# Patient Record
Sex: FEMALE | Race: WHITE | NOT HISPANIC OR LATINO | Employment: OTHER | URBAN - METROPOLITAN AREA
[De-identification: names, ages, dates, MRNs, and addresses within clinical notes are randomized per-mention and may not be internally consistent; named-entity substitution may affect disease eponyms.]

---

## 2017-03-27 ENCOUNTER — APPOINTMENT (OUTPATIENT)
Dept: PREADMISSION TESTING | Facility: HOSPITAL | Age: 57
End: 2017-03-27

## 2017-03-27 VITALS
TEMPERATURE: 98 F | BODY MASS INDEX: 27.66 KG/M2 | WEIGHT: 162 LBS | DIASTOLIC BLOOD PRESSURE: 74 MMHG | HEIGHT: 64 IN | RESPIRATION RATE: 16 BRPM | SYSTOLIC BLOOD PRESSURE: 109 MMHG | HEART RATE: 72 BPM | OXYGEN SATURATION: 96 %

## 2017-03-27 LAB
ANION GAP SERPL CALCULATED.3IONS-SCNC: 16.7 MMOL/L
BUN BLD-MCNC: 15 MG/DL (ref 6–20)
BUN/CREAT SERPL: 22.7 (ref 7–25)
CALCIUM SPEC-SCNC: 9.1 MG/DL (ref 8.6–10.5)
CHLORIDE SERPL-SCNC: 100 MMOL/L (ref 98–107)
CO2 SERPL-SCNC: 25.3 MMOL/L (ref 22–29)
CREAT BLD-MCNC: 0.66 MG/DL (ref 0.57–1)
DEPRECATED RDW RBC AUTO: 43.9 FL (ref 37–54)
ERYTHROCYTE [DISTWIDTH] IN BLOOD BY AUTOMATED COUNT: 13.5 % (ref 11.7–13)
GFR SERPL CREATININE-BSD FRML MDRD: 92 ML/MIN/1.73
GLUCOSE BLD-MCNC: 169 MG/DL (ref 65–99)
HCT VFR BLD AUTO: 43.4 % (ref 35.6–45.5)
HGB BLD-MCNC: 14.9 G/DL (ref 11.9–15.5)
MCH RBC QN AUTO: 30.6 PG (ref 26.9–32)
MCHC RBC AUTO-ENTMCNC: 34.3 G/DL (ref 32.4–36.3)
MCV RBC AUTO: 89.1 FL (ref 80.5–98.2)
PLATELET # BLD AUTO: 131 10*3/MM3 (ref 140–500)
PMV BLD AUTO: 10.6 FL (ref 6–12)
POTASSIUM BLD-SCNC: 3.4 MMOL/L (ref 3.5–5.2)
RBC # BLD AUTO: 4.87 10*6/MM3 (ref 3.9–5.2)
SODIUM BLD-SCNC: 142 MMOL/L (ref 136–145)
WBC NRBC COR # BLD: 5.38 10*3/MM3 (ref 4.5–10.7)

## 2017-03-27 PROCEDURE — 93005 ELECTROCARDIOGRAM TRACING: CPT

## 2017-03-27 PROCEDURE — 93010 ELECTROCARDIOGRAM REPORT: CPT | Performed by: INTERNAL MEDICINE

## 2017-03-27 PROCEDURE — 80048 BASIC METABOLIC PNL TOTAL CA: CPT | Performed by: SURGERY

## 2017-03-27 PROCEDURE — 85027 COMPLETE CBC AUTOMATED: CPT | Performed by: SURGERY

## 2017-03-27 PROCEDURE — 36415 COLL VENOUS BLD VENIPUNCTURE: CPT

## 2017-03-27 RX ORDER — HYDROCHLOROTHIAZIDE 25 MG/1
25 TABLET ORAL DAILY
COMMUNITY
End: 2017-08-14

## 2017-03-27 RX ORDER — LORATADINE 10 MG/1
10 TABLET ORAL DAILY
COMMUNITY
End: 2021-01-14

## 2017-03-27 RX ORDER — CHOLECALCIFEROL (VITAMIN D3) 25 MCG
1000 CAPSULE ORAL DAILY
COMMUNITY
End: 2021-01-14

## 2017-03-27 RX ORDER — GABAPENTIN 300 MG/1
300 CAPSULE ORAL NIGHTLY
COMMUNITY
End: 2017-08-14 | Stop reason: SDUPTHER

## 2017-03-27 RX ORDER — HYDROCODONE BITARTRATE AND ACETAMINOPHEN 7.5; 325 MG/1; MG/1
1 TABLET ORAL EVERY 6 HOURS PRN
COMMUNITY
End: 2021-01-14

## 2017-03-27 RX ORDER — METOPROLOL SUCCINATE 50 MG/1
50 TABLET, EXTENDED RELEASE ORAL DAILY
COMMUNITY
End: 2021-01-14

## 2017-03-27 RX ORDER — RANITIDINE 300 MG/1
300 TABLET ORAL NIGHTLY PRN
COMMUNITY
End: 2021-01-14

## 2017-03-27 RX ORDER — PNV NO.95/FERROUS FUM/FOLIC AC 28MG-0.8MG
1 TABLET ORAL
COMMUNITY
End: 2021-01-14

## 2017-03-27 RX ORDER — VENLAFAXINE HYDROCHLORIDE 150 MG/1
150 CAPSULE, EXTENDED RELEASE ORAL DAILY
COMMUNITY
End: 2021-01-14

## 2017-03-27 RX ORDER — BACLOFEN 10 MG/1
10 TABLET ORAL 3 TIMES DAILY PRN
COMMUNITY
End: 2021-01-14

## 2017-03-27 RX ORDER — CLONAZEPAM 2 MG/1
2 TABLET ORAL NIGHTLY
COMMUNITY
End: 2021-01-14

## 2017-03-27 RX ORDER — BUTALBITAL, ACETAMINOPHEN AND CAFFEINE 50; 325; 40 MG/1; MG/1; MG/1
1 TABLET ORAL EVERY 4 HOURS PRN
COMMUNITY
End: 2021-01-14

## 2017-03-27 RX ORDER — GABAPENTIN 100 MG/1
100 CAPSULE ORAL 2 TIMES DAILY
COMMUNITY
End: 2017-08-14 | Stop reason: SDUPTHER

## 2017-03-27 RX ORDER — PANTOPRAZOLE SODIUM 40 MG/1
40 TABLET, DELAYED RELEASE ORAL DAILY
COMMUNITY
End: 2021-01-14

## 2017-04-03 ENCOUNTER — ANESTHESIA EVENT (OUTPATIENT)
Dept: PERIOP | Facility: HOSPITAL | Age: 57
End: 2017-04-03

## 2017-04-03 ENCOUNTER — ANESTHESIA (OUTPATIENT)
Dept: PERIOP | Facility: HOSPITAL | Age: 57
End: 2017-04-03

## 2017-04-03 ENCOUNTER — HOSPITAL ENCOUNTER (OUTPATIENT)
Facility: HOSPITAL | Age: 57
Discharge: HOME | End: 2017-04-04
Attending: SURGERY | Admitting: SURGERY

## 2017-04-03 DIAGNOSIS — N62 MACROMASTIA: ICD-10-CM

## 2017-04-03 PROCEDURE — A9270 NON-COVERED ITEM OR SERVICE: HCPCS | Performed by: SURGERY

## 2017-04-03 PROCEDURE — 25010000002 ONDANSETRON PER 1 MG: Performed by: NURSE ANESTHETIST, CERTIFIED REGISTERED

## 2017-04-03 PROCEDURE — 63710000001 POVIDONE-IODINE 10 % SOLUTION 15 ML BOTTLE: Performed by: SURGERY

## 2017-04-03 PROCEDURE — 25010000003 METHYLPREDNISOLONE PER 125 MG: Performed by: SURGERY

## 2017-04-03 PROCEDURE — 25010000002 FENTANYL CITRATE (PF) 100 MCG/2ML SOLUTION: Performed by: NURSE ANESTHETIST, CERTIFIED REGISTERED

## 2017-04-03 PROCEDURE — 88305 TISSUE EXAM BY PATHOLOGIST: CPT | Performed by: SURGERY

## 2017-04-03 PROCEDURE — 25010000002 PROPOFOL 10 MG/ML EMULSION: Performed by: NURSE ANESTHETIST, CERTIFIED REGISTERED

## 2017-04-03 PROCEDURE — 25010000002 NEOSTIGMINE 10 MG/10ML SOLUTION: Performed by: ANESTHESIOLOGY

## 2017-04-03 PROCEDURE — 25010000002 HYDROMORPHONE PER 4 MG: Performed by: ANESTHESIOLOGY

## 2017-04-03 PROCEDURE — 63710000001 GABAPENTIN 300 MG CAPSULE: Performed by: SURGERY

## 2017-04-03 PROCEDURE — 63710000001 HYDROCODONE-ACETAMINOPHEN 7.5-325 MG TABLET: Performed by: SURGERY

## 2017-04-03 PROCEDURE — 63710000001 CYCLOBENZAPRINE 10 MG TABLET: Performed by: SURGERY

## 2017-04-03 PROCEDURE — 63710000001 FAMOTIDINE 20 MG TABLET: Performed by: SURGERY

## 2017-04-03 PROCEDURE — 63710000001 NITROGLYCERIN 2 % OINTMENT: Performed by: SURGERY

## 2017-04-03 PROCEDURE — 25010000002 ROPIVACAINE PER 1 MG: Performed by: SURGERY

## 2017-04-03 PROCEDURE — 25010000002 MIDAZOLAM PER 1 MG: Performed by: ANESTHESIOLOGY

## 2017-04-03 PROCEDURE — 63710000001 ONDANSETRON PER 8 MG: Performed by: SURGERY

## 2017-04-03 PROCEDURE — 63710000001 DOCUSATE SODIUM 100 MG CAPSULE: Performed by: SURGERY

## 2017-04-03 RX ORDER — LIDOCAINE HYDROCHLORIDE 10 MG/ML
1 INJECTION, SOLUTION EPIDURAL; INFILTRATION; INTRACAUDAL; PERINEURAL ONCE
Status: DISCONTINUED | OUTPATIENT
Start: 2017-04-03 | End: 2017-04-03 | Stop reason: HOSPADM

## 2017-04-03 RX ORDER — GABAPENTIN 100 MG/1
100 CAPSULE ORAL 2 TIMES DAILY
Status: DISCONTINUED | OUTPATIENT
Start: 2017-04-03 | End: 2017-04-04 | Stop reason: HOSPADM

## 2017-04-03 RX ORDER — HYDROMORPHONE HYDROCHLORIDE 1 MG/ML
0.5 INJECTION, SOLUTION INTRAMUSCULAR; INTRAVENOUS; SUBCUTANEOUS
Status: DISCONTINUED | OUTPATIENT
Start: 2017-04-03 | End: 2017-04-03 | Stop reason: HOSPADM

## 2017-04-03 RX ORDER — ACETAMINOPHEN 325 MG/1
975 TABLET ORAL ONCE
Status: COMPLETED | OUTPATIENT
Start: 2017-04-03 | End: 2017-04-03

## 2017-04-03 RX ORDER — FENTANYL CITRATE 50 UG/ML
INJECTION, SOLUTION INTRAMUSCULAR; INTRAVENOUS AS NEEDED
Status: DISCONTINUED | OUTPATIENT
Start: 2017-04-03 | End: 2017-04-03 | Stop reason: SURG

## 2017-04-03 RX ORDER — METOPROLOL SUCCINATE 50 MG/1
50 TABLET, EXTENDED RELEASE ORAL DAILY
Status: DISCONTINUED | OUTPATIENT
Start: 2017-04-03 | End: 2017-04-04 | Stop reason: HOSPADM

## 2017-04-03 RX ORDER — HYDRALAZINE HYDROCHLORIDE 20 MG/ML
5 INJECTION INTRAMUSCULAR; INTRAVENOUS
Status: DISCONTINUED | OUTPATIENT
Start: 2017-04-03 | End: 2017-04-03 | Stop reason: HOSPADM

## 2017-04-03 RX ORDER — ROPIVACAINE HYDROCHLORIDE 5 MG/ML
INJECTION, SOLUTION EPIDURAL; INFILTRATION; PERINEURAL AS NEEDED
Status: DISCONTINUED | OUTPATIENT
Start: 2017-04-03 | End: 2017-04-03 | Stop reason: HOSPADM

## 2017-04-03 RX ORDER — HYDROMORPHONE HCL 110MG/55ML
PATIENT CONTROLLED ANALGESIA SYRINGE INTRAVENOUS AS NEEDED
Status: DISCONTINUED | OUTPATIENT
Start: 2017-04-03 | End: 2017-04-03 | Stop reason: SURG

## 2017-04-03 RX ORDER — FAMOTIDINE 20 MG/1
40 TABLET, FILM COATED ORAL NIGHTLY
Status: DISCONTINUED | OUTPATIENT
Start: 2017-04-03 | End: 2017-04-04 | Stop reason: HOSPADM

## 2017-04-03 RX ORDER — POLYETHYLENE GLYCOL 3350 17 G/17G
17 POWDER, FOR SOLUTION ORAL DAILY
Status: DISCONTINUED | OUTPATIENT
Start: 2017-04-04 | End: 2017-04-04 | Stop reason: HOSPADM

## 2017-04-03 RX ORDER — SODIUM CHLORIDE, SODIUM LACTATE, POTASSIUM CHLORIDE, CALCIUM CHLORIDE 600; 310; 30; 20 MG/100ML; MG/100ML; MG/100ML; MG/100ML
9 INJECTION, SOLUTION INTRAVENOUS CONTINUOUS
Status: DISCONTINUED | OUTPATIENT
Start: 2017-04-03 | End: 2017-04-03

## 2017-04-03 RX ORDER — PROMETHAZINE HYDROCHLORIDE 25 MG/1
25 SUPPOSITORY RECTAL ONCE AS NEEDED
Status: DISCONTINUED | OUTPATIENT
Start: 2017-04-03 | End: 2017-04-03 | Stop reason: HOSPADM

## 2017-04-03 RX ORDER — CYCLOBENZAPRINE HCL 10 MG
5 TABLET ORAL 3 TIMES DAILY PRN
Status: DISCONTINUED | OUTPATIENT
Start: 2017-04-03 | End: 2017-04-04 | Stop reason: HOSPADM

## 2017-04-03 RX ORDER — HYDROMORPHONE HYDROCHLORIDE 1 MG/ML
0.25 INJECTION, SOLUTION INTRAMUSCULAR; INTRAVENOUS; SUBCUTANEOUS
Status: DISCONTINUED | OUTPATIENT
Start: 2017-04-03 | End: 2017-04-04 | Stop reason: HOSPADM

## 2017-04-03 RX ORDER — ONDANSETRON 2 MG/ML
4 INJECTION INTRAMUSCULAR; INTRAVENOUS ONCE AS NEEDED
Status: DISCONTINUED | OUTPATIENT
Start: 2017-04-03 | End: 2017-04-03 | Stop reason: HOSPADM

## 2017-04-03 RX ORDER — HYDROCODONE BITARTRATE AND ACETAMINOPHEN 7.5; 325 MG/1; MG/1
1 TABLET ORAL EVERY 6 HOURS PRN
Status: DISCONTINUED | OUTPATIENT
Start: 2017-04-03 | End: 2017-04-04 | Stop reason: HOSPADM

## 2017-04-03 RX ORDER — HYDROCODONE BITARTRATE AND ACETAMINOPHEN 7.5; 325 MG/1; MG/1
2 TABLET ORAL EVERY 4 HOURS PRN
Status: DISCONTINUED | OUTPATIENT
Start: 2017-04-03 | End: 2017-04-04 | Stop reason: HOSPADM

## 2017-04-03 RX ORDER — NEOSTIGMINE METHYLSULFATE 1 MG/ML
INJECTION, SOLUTION INTRAVENOUS AS NEEDED
Status: DISCONTINUED | OUTPATIENT
Start: 2017-04-03 | End: 2017-04-03 | Stop reason: SURG

## 2017-04-03 RX ORDER — GLYCOPYRROLATE 0.2 MG/ML
INJECTION INTRAMUSCULAR; INTRAVENOUS AS NEEDED
Status: DISCONTINUED | OUTPATIENT
Start: 2017-04-03 | End: 2017-04-03 | Stop reason: SURG

## 2017-04-03 RX ORDER — SCOLOPAMINE TRANSDERMAL SYSTEM 1 MG/1
1 PATCH, EXTENDED RELEASE TRANSDERMAL ONCE
Status: DISCONTINUED | OUTPATIENT
Start: 2017-04-03 | End: 2017-04-03

## 2017-04-03 RX ORDER — SODIUM CHLORIDE 0.9 % (FLUSH) 0.9 %
1-10 SYRINGE (ML) INJECTION AS NEEDED
Status: DISCONTINUED | OUTPATIENT
Start: 2017-04-03 | End: 2017-04-03 | Stop reason: HOSPADM

## 2017-04-03 RX ORDER — NALOXONE HCL 0.4 MG/ML
0.2 VIAL (ML) INJECTION AS NEEDED
Status: DISCONTINUED | OUTPATIENT
Start: 2017-04-03 | End: 2017-04-03 | Stop reason: HOSPADM

## 2017-04-03 RX ORDER — ONDANSETRON 4 MG/1
4 TABLET, FILM COATED ORAL EVERY 6 HOURS PRN
Status: DISCONTINUED | OUTPATIENT
Start: 2017-04-03 | End: 2017-04-04 | Stop reason: HOSPADM

## 2017-04-03 RX ORDER — CELECOXIB 200 MG/1
400 CAPSULE ORAL ONCE
Status: COMPLETED | OUTPATIENT
Start: 2017-04-03 | End: 2017-04-03

## 2017-04-03 RX ORDER — PANTOPRAZOLE SODIUM 40 MG/1
40 TABLET, DELAYED RELEASE ORAL DAILY
Status: DISCONTINUED | OUTPATIENT
Start: 2017-04-03 | End: 2017-04-03

## 2017-04-03 RX ORDER — HYDROXYZINE PAMOATE 50 MG/1
50 CAPSULE ORAL ONCE
Status: COMPLETED | OUTPATIENT
Start: 2017-04-03 | End: 2017-04-03

## 2017-04-03 RX ORDER — PROMETHAZINE HYDROCHLORIDE 25 MG/ML
12.5 INJECTION, SOLUTION INTRAMUSCULAR; INTRAVENOUS ONCE AS NEEDED
Status: DISCONTINUED | OUTPATIENT
Start: 2017-04-03 | End: 2017-04-03 | Stop reason: HOSPADM

## 2017-04-03 RX ORDER — ONDANSETRON 2 MG/ML
INJECTION INTRAMUSCULAR; INTRAVENOUS AS NEEDED
Status: DISCONTINUED | OUTPATIENT
Start: 2017-04-03 | End: 2017-04-03 | Stop reason: SURG

## 2017-04-03 RX ORDER — ONDANSETRON 4 MG/1
4 TABLET, ORALLY DISINTEGRATING ORAL EVERY 6 HOURS PRN
Status: DISCONTINUED | OUTPATIENT
Start: 2017-04-03 | End: 2017-04-04 | Stop reason: HOSPADM

## 2017-04-03 RX ORDER — MIDAZOLAM HYDROCHLORIDE 1 MG/ML
1 INJECTION INTRAMUSCULAR; INTRAVENOUS
Status: DISCONTINUED | OUTPATIENT
Start: 2017-04-03 | End: 2017-04-03 | Stop reason: HOSPADM

## 2017-04-03 RX ORDER — DOCUSATE SODIUM 100 MG/1
100 CAPSULE, LIQUID FILLED ORAL 2 TIMES DAILY
Status: DISCONTINUED | OUTPATIENT
Start: 2017-04-03 | End: 2017-04-04 | Stop reason: HOSPADM

## 2017-04-03 RX ORDER — CLONAZEPAM 0.5 MG/1
2 TABLET ORAL NIGHTLY
Status: DISCONTINUED | OUTPATIENT
Start: 2017-04-03 | End: 2017-04-04 | Stop reason: HOSPADM

## 2017-04-03 RX ORDER — FLUMAZENIL 0.1 MG/ML
0.2 INJECTION INTRAVENOUS AS NEEDED
Status: DISCONTINUED | OUTPATIENT
Start: 2017-04-03 | End: 2017-04-03 | Stop reason: HOSPADM

## 2017-04-03 RX ORDER — SODIUM CHLORIDE, SODIUM LACTATE, POTASSIUM CHLORIDE, CALCIUM CHLORIDE 600; 310; 30; 20 MG/100ML; MG/100ML; MG/100ML; MG/100ML
125 INJECTION, SOLUTION INTRAVENOUS CONTINUOUS
Status: DISCONTINUED | OUTPATIENT
Start: 2017-04-03 | End: 2017-04-04 | Stop reason: HOSPADM

## 2017-04-03 RX ORDER — BACLOFEN 10 MG/1
10 TABLET ORAL 3 TIMES DAILY PRN
Status: DISCONTINUED | OUTPATIENT
Start: 2017-04-03 | End: 2017-04-04 | Stop reason: HOSPADM

## 2017-04-03 RX ORDER — NALOXONE HCL 0.4 MG/ML
0.1 VIAL (ML) INJECTION
Status: DISCONTINUED | OUTPATIENT
Start: 2017-04-03 | End: 2017-04-04 | Stop reason: HOSPADM

## 2017-04-03 RX ORDER — HYDROCODONE BITARTRATE AND ACETAMINOPHEN 7.5; 325 MG/1; MG/1
1 TABLET ORAL ONCE AS NEEDED
Status: DISCONTINUED | OUTPATIENT
Start: 2017-04-03 | End: 2017-04-03 | Stop reason: HOSPADM

## 2017-04-03 RX ORDER — FENTANYL CITRATE 50 UG/ML
50 INJECTION, SOLUTION INTRAMUSCULAR; INTRAVENOUS
Status: DISCONTINUED | OUTPATIENT
Start: 2017-04-03 | End: 2017-04-03 | Stop reason: HOSPADM

## 2017-04-03 RX ORDER — CYCLOBENZAPRINE HCL 10 MG
5 TABLET ORAL ONCE
Status: COMPLETED | OUTPATIENT
Start: 2017-04-03 | End: 2017-04-03

## 2017-04-03 RX ORDER — HYDROCODONE BITARTRATE AND ACETAMINOPHEN 7.5; 325 MG/1; MG/1
2 TABLET ORAL EVERY 4 HOURS PRN
Status: DISCONTINUED | OUTPATIENT
Start: 2017-04-03 | End: 2017-04-03

## 2017-04-03 RX ORDER — GABAPENTIN 300 MG/1
300 CAPSULE ORAL NIGHTLY
Status: DISCONTINUED | OUTPATIENT
Start: 2017-04-03 | End: 2017-04-04 | Stop reason: HOSPADM

## 2017-04-03 RX ORDER — ONDANSETRON 2 MG/ML
4 INJECTION INTRAMUSCULAR; INTRAVENOUS EVERY 6 HOURS PRN
Status: DISCONTINUED | OUTPATIENT
Start: 2017-04-03 | End: 2017-04-04 | Stop reason: HOSPADM

## 2017-04-03 RX ORDER — PROMETHAZINE HYDROCHLORIDE 25 MG/1
25 TABLET ORAL ONCE AS NEEDED
Status: DISCONTINUED | OUTPATIENT
Start: 2017-04-03 | End: 2017-04-03 | Stop reason: HOSPADM

## 2017-04-03 RX ORDER — PANTOPRAZOLE SODIUM 40 MG/1
40 TABLET, DELAYED RELEASE ORAL
Status: DISCONTINUED | OUTPATIENT
Start: 2017-04-03 | End: 2017-04-04 | Stop reason: HOSPADM

## 2017-04-03 RX ORDER — VENLAFAXINE HYDROCHLORIDE 150 MG/1
150 CAPSULE, EXTENDED RELEASE ORAL DAILY
Status: DISCONTINUED | OUTPATIENT
Start: 2017-04-03 | End: 2017-04-04 | Stop reason: HOSPADM

## 2017-04-03 RX ORDER — OXYCODONE AND ACETAMINOPHEN 7.5; 325 MG/1; MG/1
1 TABLET ORAL ONCE AS NEEDED
Status: DISCONTINUED | OUTPATIENT
Start: 2017-04-03 | End: 2017-04-03 | Stop reason: HOSPADM

## 2017-04-03 RX ORDER — LABETALOL HYDROCHLORIDE 5 MG/ML
5 INJECTION, SOLUTION INTRAVENOUS
Status: DISCONTINUED | OUTPATIENT
Start: 2017-04-03 | End: 2017-04-03 | Stop reason: HOSPADM

## 2017-04-03 RX ORDER — HYDROXYZINE PAMOATE 50 MG/1
50 CAPSULE ORAL 4 TIMES DAILY PRN
Status: DISCONTINUED | OUTPATIENT
Start: 2017-04-03 | End: 2017-04-04 | Stop reason: HOSPADM

## 2017-04-03 RX ORDER — CYCLOBENZAPRINE HCL 10 MG
10 TABLET ORAL ONCE
Status: COMPLETED | OUTPATIENT
Start: 2017-04-03 | End: 2017-04-03

## 2017-04-03 RX ORDER — ROCURONIUM BROMIDE 10 MG/ML
INJECTION, SOLUTION INTRAVENOUS AS NEEDED
Status: DISCONTINUED | OUTPATIENT
Start: 2017-04-03 | End: 2017-04-03 | Stop reason: SURG

## 2017-04-03 RX ORDER — DIPHENHYDRAMINE HYDROCHLORIDE 50 MG/ML
12.5 INJECTION INTRAMUSCULAR; INTRAVENOUS
Status: DISCONTINUED | OUTPATIENT
Start: 2017-04-03 | End: 2017-04-03 | Stop reason: HOSPADM

## 2017-04-03 RX ORDER — PROMETHAZINE HYDROCHLORIDE 25 MG/1
12.5 TABLET ORAL ONCE AS NEEDED
Status: DISCONTINUED | OUTPATIENT
Start: 2017-04-03 | End: 2017-04-03 | Stop reason: HOSPADM

## 2017-04-03 RX ORDER — MIDAZOLAM HYDROCHLORIDE 1 MG/ML
2 INJECTION INTRAMUSCULAR; INTRAVENOUS
Status: DISCONTINUED | OUTPATIENT
Start: 2017-04-03 | End: 2017-04-03 | Stop reason: HOSPADM

## 2017-04-03 RX ORDER — CETIRIZINE HYDROCHLORIDE 10 MG/1
10 TABLET ORAL DAILY
Status: DISCONTINUED | OUTPATIENT
Start: 2017-04-03 | End: 2017-04-04 | Stop reason: HOSPADM

## 2017-04-03 RX ORDER — CLINDAMYCIN PHOSPHATE 900 MG/50ML
900 INJECTION INTRAVENOUS ONCE
Status: COMPLETED | OUTPATIENT
Start: 2017-04-03 | End: 2017-04-03

## 2017-04-03 RX ORDER — PROPOFOL 10 MG/ML
VIAL (ML) INTRAVENOUS AS NEEDED
Status: DISCONTINUED | OUTPATIENT
Start: 2017-04-03 | End: 2017-04-03 | Stop reason: SURG

## 2017-04-03 RX ORDER — FAMOTIDINE 10 MG/ML
20 INJECTION, SOLUTION INTRAVENOUS ONCE
Status: COMPLETED | OUTPATIENT
Start: 2017-04-03 | End: 2017-04-03

## 2017-04-03 RX ORDER — LIDOCAINE HYDROCHLORIDE 20 MG/ML
INJECTION, SOLUTION INFILTRATION; PERINEURAL AS NEEDED
Status: DISCONTINUED | OUTPATIENT
Start: 2017-04-03 | End: 2017-04-03 | Stop reason: SURG

## 2017-04-03 RX ADMIN — PROPOFOL 200 MG: 10 INJECTION, EMULSION INTRAVENOUS at 12:21

## 2017-04-03 RX ADMIN — ONDANSETRON 4 MG: 4 TABLET, FILM COATED ORAL at 17:54

## 2017-04-03 RX ADMIN — GABAPENTIN 300 MG: 300 CAPSULE ORAL at 23:30

## 2017-04-03 RX ADMIN — ACETAMINOPHEN 975 MG: 325 TABLET ORAL at 11:36

## 2017-04-03 RX ADMIN — SODIUM CHLORIDE, POTASSIUM CHLORIDE, SODIUM LACTATE AND CALCIUM CHLORIDE 9 ML/HR: 600; 310; 30; 20 INJECTION, SOLUTION INTRAVENOUS at 15:47

## 2017-04-03 RX ADMIN — GLYCOPYRROLATE 0.4 MG: 0.2 INJECTION INTRAMUSCULAR; INTRAVENOUS at 15:23

## 2017-04-03 RX ADMIN — SODIUM CHLORIDE, POTASSIUM CHLORIDE, SODIUM LACTATE AND CALCIUM CHLORIDE 9 ML/HR: 600; 310; 30; 20 INJECTION, SOLUTION INTRAVENOUS at 11:31

## 2017-04-03 RX ADMIN — ROCURONIUM BROMIDE 50 MG: 10 INJECTION INTRAVENOUS at 12:21

## 2017-04-03 RX ADMIN — CELECOXIB 400 MG: 200 CAPSULE ORAL at 11:36

## 2017-04-03 RX ADMIN — NEOSTIGMINE METHYLSULFATE 2 MG: 1 INJECTION INTRAVENOUS at 15:23

## 2017-04-03 RX ADMIN — HYDROXYZINE PAMOATE 50 MG: 50 CAPSULE ORAL at 11:35

## 2017-04-03 RX ADMIN — FENTANYL CITRATE 150 MCG: 50 INJECTION, SOLUTION INTRAMUSCULAR; INTRAVENOUS at 12:21

## 2017-04-03 RX ADMIN — SODIUM CHLORIDE 250 MG: 9 INJECTION, SOLUTION INTRAVENOUS at 11:37

## 2017-04-03 RX ADMIN — ROCURONIUM BROMIDE 20 MG: 10 INJECTION INTRAVENOUS at 13:19

## 2017-04-03 RX ADMIN — SODIUM CHLORIDE 250 MG: 9 INJECTION, SOLUTION INTRAVENOUS at 11:35

## 2017-04-03 RX ADMIN — FAMOTIDINE 20 MG: 10 INJECTION, SOLUTION INTRAVENOUS at 11:47

## 2017-04-03 RX ADMIN — ONDANSETRON 4 MG: 2 INJECTION INTRAMUSCULAR; INTRAVENOUS at 14:54

## 2017-04-03 RX ADMIN — SCOPALAMINE 1 PATCH: 1 PATCH, EXTENDED RELEASE TRANSDERMAL at 11:36

## 2017-04-03 RX ADMIN — HYDROCODONE BITARTRATE AND ACETAMINOPHEN 1 TABLET: 7.5; 325 TABLET ORAL at 23:31

## 2017-04-03 RX ADMIN — HYDROMORPHONE HYDROCHLORIDE 0.5 MG: 2 INJECTION, SOLUTION INTRAMUSCULAR; INTRAVENOUS; SUBCUTANEOUS at 15:28

## 2017-04-03 RX ADMIN — HYDROCODONE BITARTRATE AND ACETAMINOPHEN 1 TABLET: 7.5; 325 TABLET ORAL at 18:45

## 2017-04-03 RX ADMIN — DOCUSATE SODIUM 100 MG: 100 CAPSULE, LIQUID FILLED ORAL at 17:54

## 2017-04-03 RX ADMIN — SODIUM CHLORIDE, POTASSIUM CHLORIDE, SODIUM LACTATE AND CALCIUM CHLORIDE: 600; 310; 30; 20 INJECTION, SOLUTION INTRAVENOUS at 13:19

## 2017-04-03 RX ADMIN — MIDAZOLAM 2 MG: 1 INJECTION INTRAMUSCULAR; INTRAVENOUS at 12:09

## 2017-04-03 RX ADMIN — NITROGLYCERIN 1 INCH: 20 OINTMENT TOPICAL at 23:34

## 2017-04-03 RX ADMIN — FENTANYL CITRATE 100 MCG: 50 INJECTION, SOLUTION INTRAMUSCULAR; INTRAVENOUS at 13:57

## 2017-04-03 RX ADMIN — CYCLOBENZAPRINE HYDROCHLORIDE 5 MG: 10 TABLET, FILM COATED ORAL at 16:35

## 2017-04-03 RX ADMIN — CLINDAMYCIN PHOSPHATE 900 MG: 900 INJECTION INTRAVENOUS at 12:32

## 2017-04-03 RX ADMIN — SODIUM CHLORIDE, POTASSIUM CHLORIDE, SODIUM LACTATE AND CALCIUM CHLORIDE: 600; 310; 30; 20 INJECTION, SOLUTION INTRAVENOUS at 15:15

## 2017-04-03 RX ADMIN — FAMOTIDINE 40 MG: 20 TABLET, FILM COATED ORAL at 23:30

## 2017-04-03 RX ADMIN — CYCLOBENZAPRINE HYDROCHLORIDE 10 MG: 10 TABLET, FILM COATED ORAL at 11:35

## 2017-04-03 RX ADMIN — LIDOCAINE HYDROCHLORIDE 60 MG: 20 INJECTION, SOLUTION INFILTRATION; PERINEURAL at 12:21

## 2017-04-03 RX ADMIN — NITROGLYCERIN 1 INCH: 20 OINTMENT TOPICAL at 18:36

## 2017-04-03 RX ADMIN — EPHEDRINE SULFATE 10 MG: 50 INJECTION INTRAMUSCULAR; INTRAVENOUS; SUBCUTANEOUS at 14:48

## 2017-04-03 RX ADMIN — HYDROCODONE BITARTRATE AND ACETAMINOPHEN 1 TABLET: 7.5; 325 TABLET ORAL at 17:54

## 2017-04-03 RX ADMIN — SODIUM CHLORIDE, POTASSIUM CHLORIDE, SODIUM LACTATE AND CALCIUM CHLORIDE: 600; 310; 30; 20 INJECTION, SOLUTION INTRAVENOUS at 14:34

## 2017-04-03 RX ADMIN — CYCLOBENZAPRINE HYDROCHLORIDE 5 MG: 10 TABLET, FILM COATED ORAL at 21:01

## 2017-04-03 NOTE — ANESTHESIA PREPROCEDURE EVALUATION
Anesthesia Evaluation     Patient summary reviewed   no history of anesthetic complications:  NPO Status: > 8 hours   Airway   Mallampati: II  TM distance: >3 FB  possible difficult intubation and small opening  Dental - normal exam     Pulmonary    (+) decreased breath sounds,   Cardiovascular - normal exam    ECG reviewed    (+) hypertension, dysrhythmias Tachycardia,     ROS comment: Abnl EKG, PT denies any cardiac symptoms    Neuro/Psych  (+) psychiatric history Anxiety,    GI/Hepatic/Renal/Endo      Musculoskeletal     Abdominal    Substance History      OB/GYN          Other                                    Anesthesia Plan    ASA 2     general     intravenous induction   Anesthetic plan and risks discussed with patient.

## 2017-04-03 NOTE — OP NOTE
Date of Procedure:  04/03/2017     PREOPERATIVE DIAGNOSES: Bilateral severe symptomatic macromastia.     POSTOPERATIVE DIAGNOSES: Bilateral severe symptomatic macromastia.     PROCEDURES PERFORMED: Bilateral reduction mammoplasty.     SURGEON: LILIAN Lebron MD     ASSISTANT: KEN Cantu     ANESTHESIA: General endotracheal anesthesia.     ESTIMATED BLOOD LOSS: 35 mL.     DRAINS: Drains x2 were placed.     COMPLICATION: None apparent.     SPECIMENS:   1. Right breast 738 g.  2. Left breast 646 g.     INDICATIONS FOR PROCEDURE: This 56-year-old has severe symptomatic macromastia and she also has had a lot of neck issues in the past and she desires reduction mammoplasty.     INFORMED CONSENT:  She understands the risks, benefits, and complications. She is extremely large and her neck pain I think is aggravated by her breast weight. We discussed reduction. She understands the risks, benefits, and complications and agrees to proceed. She is exposed to secondhand smoke at home and I have asked her to stay away from that and she says she has; she is a nonsmoker herself. We marked her prior to surgery, placing the nipple areolar point at the anterior projection of the inframammary crease, and a modified Rojas pattern was drawn with an inferior central-type pedicle planned.     DESCRIPTION OF PROCEDURE: She was brought to the operating room and placed on the operating table in the supine position. Satisfactory general endotracheal anesthesia was achieved without difficulty. We injected dilute local anesthesia around the periphery of the breast, and after sterile prep and drape we de-epithelialized around a 40 mm nipple areolar complex and de-epithelialized an inferior central pedicle, elevated thick superior skin flaps through our modified Rojas pattern, and resected breast tissue from medially, superiorly, and laterally, leaving a generous inferior mostly central-type pedicle. There was excellent circulation noted  to the nipple areolar complex. No vascular compromise was noted nor venous congestion noted. We irrigated with antibiotic-containing saline thoroughly, had anesthesia give several deep positive-pressure ventilation breaths, and cauterized all bleeding points thoroughly. We placed a 15-Mauritanian Duy drain on each side securing it with a nylon suture and we reapproximated the skin edges with temporary surgical staples. We brought the nipple areolar complex through the nipple areolar cutout of 40 mm. The resection weight on the right side was 738 g, the left side was 646 g. We removed the staples, replacing them with multiple 4-0 Vicryls and 5-0 PDS and the most lateral aspect of the incision toward the axilla was closed with 4-0 Vicryl and a 4-0 V-Loc. The nipple areolar inset was with 4-0 Vicryl and 5-0 PDS as well. Circulation was excellent. The skin was without any undue tension and seemed to have excellent circulation. We placed Steri-Strips across all the incisions, and because her nicotine exposure secondhand smoke at home possibly we did place nitroglycerin paste as a preventative measure on the nipple areolar complex and the corners of the vertical incision. This was placed 1 inch on each side and a Telfa placed. She tolerated the procedure well and ABD pads and a light tube top were applied and she went to the recovery area in satisfactory condition.       LILIAN Lebron M.D.  RTN:ab  D:   04/03/2017 15:57:10  T:   04/03/2017 18:32:50  Job ID:   [id number]  Document ID:   [id number]  cc:

## 2017-04-03 NOTE — ANESTHESIA PROCEDURE NOTES
Airway  Airway not difficult    General Information and Staff    Patient location during procedure: OR  Anesthesiologist: MADELIN BROWN  CRNA: DE JACKSON    Indications and Patient Condition  Indications for airway management: airway protection    Preoxygenated: yes  Mask difficulty assessment: 1 - vent by mask    Final Airway Details  Final airway type: endotracheal airway      Successful airway: ETT  Cuffed: yes   Successful intubation technique: direct laryngoscopy  Facilitating devices/methods: intubating stylet  Endotracheal tube insertion site: oral  Blade: Mascorro  Blade size: #2  ETT size: 7.0 mm  Cormack-Lehane Classification: grade I - full view of glottis  Placement verified by: chest auscultation and capnometry   Measured from: lips  ETT to lips (cm): 20  Number of attempts at approach: 1    Additional Comments  Atraumatic, MOP to cuff, BSBE, no change to dentition, secured with tape

## 2017-04-03 NOTE — ANESTHESIA POSTPROCEDURE EVALUATION
Patient: Consuelo ANGELA    Procedure Summary     Date Anesthesia Start Anesthesia Stop Room / Location    04/03/17 1212 1547  [facility] / [facility]       Procedure Diagnosis Surgeon Provider    BREAST REDUCTION BILATERAL (Bilateral Chest) No diagnosis on file. MD Jim Cadena MD          Anesthesia Type: general  Last vitals  /69 (04/03/17 1645)    Temp 36.6 °C (97.8 °F) (04/03/17 1645)    Pulse 102 (04/03/17 1645)   Resp 16 (04/03/17 1645)    SpO2 96 % (04/03/17 1645)      Post Anesthesia Care and Evaluation    Patient location during evaluation: bedside  Patient participation: complete - patient participated  Level of consciousness: awake  Pain management: adequate  Airway patency: patent  Anesthetic complications: No anesthetic complications    Cardiovascular status: acceptable  Respiratory status: acceptable  Hydration status: acceptable

## 2017-04-03 NOTE — PLAN OF CARE
Problem: Patient Care Overview (Adult)  Goal: Plan of Care Review  Outcome: Ongoing (interventions implemented as appropriate)    04/03/17 1118   Coping/Psychosocial Response Interventions   Plan Of Care Reviewed With patient   Patient Care Overview   Progress no change       Goal: Adult Individualization and Mutuality  Outcome: Ongoing (interventions implemented as appropriate)  Goal: Discharge Needs Assessment  Outcome: Ongoing (interventions implemented as appropriate)    04/03/17 1118   Discharge Needs Assessment   Concerns To Be Addressed denies needs/concerns at this time         Problem: Perioperative Period (Adult)  Goal: Signs and Symptoms of Listed Potential Problems Will be Absent or Manageable (Perioperative Period)  Outcome: Ongoing (interventions implemented as appropriate)    04/03/17 1118   Perioperative Period   Problems Assessed (Perioperative Period) pain;hypoxia/hypoxemia   Problems Present (Perioperative Period) pain

## 2017-04-03 NOTE — BRIEF OP NOTE
BREAST REDUCTION BILATERAL  Procedure Note    Consuelo ANGELA  4/3/2017    Pre-op Diagnosis: macromastia  * No pre-op diagnosis entered *    Post-op Diagnosis:  macromastia   * No Diagnosis Codes entered *    Procedure/CPT® Codes:      Procedure(s):  BREAST REDUCTION BILATERAL    Surgeon(s):  Jorge Lebron MD    Anesthesia: General    Staff:   Circulator: Divya Vargas RN; Erica Hammond RN  Scrub Person: Lori Ramos  Assistant: Magalis Booth    Estimated Blood Loss: 35cc  Urine Voided: 25 mL    Specimens:                  ID Type Source Tests Collected by Time Destination   A :  Tissue Breast, Right TISSUE EXAM Jorge Lebron MD 4/3/2017 1305    B :  Tissue Breast, Left TISSUE EXAM Jorge Lebron MD 4/3/2017 1446          Drains:   Drain/Device Site 04/03/17 1330 Right axilla collapsible closed device (Active)       Drain/Device Site 04/03/17 1431 Left axilla (Active)       Urethral Catheter 04/03/17 1225 100% silicone 16 10 10 (Active)           Findings: none     Complications: none      Jorge Lebron MD     Date: 4/3/2017  Time: 3:24 PM

## 2017-04-04 VITALS
SYSTOLIC BLOOD PRESSURE: 89 MMHG | OXYGEN SATURATION: 94 % | WEIGHT: 158.5 LBS | RESPIRATION RATE: 16 BRPM | BODY MASS INDEX: 27.06 KG/M2 | TEMPERATURE: 97.2 F | DIASTOLIC BLOOD PRESSURE: 58 MMHG | HEIGHT: 64 IN | HEART RATE: 72 BPM

## 2017-04-04 PROCEDURE — 94799 UNLISTED PULMONARY SVC/PX: CPT

## 2017-04-04 PROCEDURE — 63710000001 GABAPENTIN 100 MG CAPSULE: Performed by: SURGERY

## 2017-04-04 PROCEDURE — 63710000001 VENLAFAXINE XR 150 MG CAPSULE SUSTAINED-RELEASE 24 HR: Performed by: SURGERY

## 2017-04-04 PROCEDURE — A9270 NON-COVERED ITEM OR SERVICE: HCPCS | Performed by: SURGERY

## 2017-04-04 PROCEDURE — 63710000001 CETIRIZINE 10 MG TABLET: Performed by: SURGERY

## 2017-04-04 PROCEDURE — 63710000001 DOCUSATE SODIUM 100 MG CAPSULE: Performed by: SURGERY

## 2017-04-04 PROCEDURE — 63710000001 NITROGLYCERIN 2 % OINTMENT: Performed by: SURGERY

## 2017-04-04 PROCEDURE — 63710000001 PANTOPRAZOLE 40 MG TABLET DELAYED-RELEASE: Performed by: SURGERY

## 2017-04-04 PROCEDURE — 63710000001 POLYETHYLENE GLYCOL PACK: Performed by: SURGERY

## 2017-04-04 PROCEDURE — 63710000001 HYDROCODONE-ACETAMINOPHEN 7.5-325 MG TABLET: Performed by: SURGERY

## 2017-04-04 RX ORDER — HYDROCODONE BITARTRATE AND ACETAMINOPHEN 7.5; 325 MG/1; MG/1
1 TABLET ORAL EVERY 6 HOURS PRN
Refills: 0
Start: 2017-04-04 | End: 2017-08-14

## 2017-04-04 RX ADMIN — GABAPENTIN 100 MG: 100 CAPSULE ORAL at 09:33

## 2017-04-04 RX ADMIN — CETIRIZINE HYDROCHLORIDE 10 MG: 10 TABLET, FILM COATED ORAL at 09:33

## 2017-04-04 RX ADMIN — POLYETHYLENE GLYCOL 3350 17 G: 17 POWDER, FOR SOLUTION ORAL at 09:33

## 2017-04-04 RX ADMIN — DOCUSATE SODIUM 100 MG: 100 CAPSULE, LIQUID FILLED ORAL at 09:33

## 2017-04-04 RX ADMIN — NITROGLYCERIN 1 INCH: 20 OINTMENT TOPICAL at 06:16

## 2017-04-04 RX ADMIN — HYDROCODONE BITARTRATE AND ACETAMINOPHEN 1 TABLET: 7.5; 325 TABLET ORAL at 04:04

## 2017-04-04 RX ADMIN — PANTOPRAZOLE SODIUM 40 MG: 40 TABLET, DELAYED RELEASE ORAL at 06:16

## 2017-04-04 RX ADMIN — VENLAFAXINE HYDROCHLORIDE 150 MG: 150 CAPSULE, EXTENDED RELEASE ORAL at 09:33

## 2017-04-04 RX ADMIN — SODIUM CHLORIDE, POTASSIUM CHLORIDE, SODIUM LACTATE AND CALCIUM CHLORIDE 125 ML/HR: 600; 310; 30; 20 INJECTION, SOLUTION INTRAVENOUS at 00:05

## 2017-04-04 NOTE — PROGRESS NOTES
Assessment/Plan doing well home today drink lots stay away from nicotine see me friday    Subjective no c/os    Temp:  [96.9 °F (36.1 °C)-97.9 °F (36.6 °C)] 97.2 °F (36.2 °C)  Heart Rate:  [] 87  Resp:  [10-18] 16  BP: ()/(53-86) 89/53  I/O last 3 completed shifts:  In: 5177.9 [P.O.:630; I.V.:4547.9]  Out: 2380 [Urine:2265; Other:80; Blood:35]       Objective nipples pink good refill skin bruised but seems healthy not tight soft no hematomas    Active Problems:    Macromastia

## 2017-04-04 NOTE — PLAN OF CARE
Problem: Patient Care Overview (Adult)  Goal: Plan of Care Review  Outcome: Ongoing (interventions implemented as appropriate)    04/04/17 0414   Coping/Psychosocial Response Interventions   Plan Of Care Reviewed With patient   Patient Care Overview   Progress improving   Outcome Evaluation   Outcome Summary/Follow up Plan doing well, incisions with scant moist drainage, nipples warm, pink with good cap refill, bruises noted both on Rt and Lt breast, Dr Lebron made aware, nitropaste applied as scheduled, adequate pain control, voiding freely, low BP all throughout the night at 89's/ 50's Dr Lebron informed last night, drain care taching done       Goal: Adult Individualization and Mutuality  Outcome: Ongoing (interventions implemented as appropriate)  Goal: Discharge Needs Assessment  Outcome: Ongoing (interventions implemented as appropriate)    Problem: Perioperative Period (Adult)  Goal: Signs and Symptoms of Listed Potential Problems Will be Absent or Manageable (Perioperative Period)  Outcome: Ongoing (interventions implemented as appropriate)    Problem: Breast Reconstruction (Adult)  Goal: Signs and Symptoms of Listed Potential Problems Will be Absent or Manageable (Breast Reconstruction)  Outcome: Ongoing (interventions implemented as appropriate)

## 2017-04-05 LAB
CYTO UR: NORMAL
LAB AP CASE REPORT: NORMAL
LAB AP CLINICAL INFORMATION: NORMAL
Lab: NORMAL
PATH REPORT.FINAL DX SPEC: NORMAL
PATH REPORT.GROSS SPEC: NORMAL

## 2017-08-14 ENCOUNTER — OFFICE VISIT (OUTPATIENT)
Dept: OBSTETRICS AND GYNECOLOGY | Facility: CLINIC | Age: 57
End: 2017-08-14

## 2017-08-14 VITALS
DIASTOLIC BLOOD PRESSURE: 74 MMHG | BODY MASS INDEX: 26.22 KG/M2 | HEIGHT: 64 IN | SYSTOLIC BLOOD PRESSURE: 138 MMHG | WEIGHT: 153.6 LBS

## 2017-08-14 DIAGNOSIS — N95.1 MENOPAUSAL AND FEMALE CLIMACTERIC STATES: ICD-10-CM

## 2017-08-14 DIAGNOSIS — Z01.419 ENCOUNTER FOR GYNECOLOGICAL EXAMINATION WITHOUT ABNORMAL FINDING: Primary | ICD-10-CM

## 2017-08-14 PROCEDURE — G0101 CA SCREEN;PELVIC/BREAST EXAM: HCPCS | Performed by: OBSTETRICS & GYNECOLOGY

## 2017-08-14 RX ORDER — HYDROCODONE BITARTRATE AND ACETAMINOPHEN 7.5; 325 MG/1; MG/1
1 TABLET ORAL
COMMUNITY
End: 2017-08-14 | Stop reason: SDUPTHER

## 2017-08-14 RX ORDER — ASPIRIN 81 MG/1
81 TABLET ORAL
COMMUNITY

## 2017-08-14 RX ORDER — GABAPENTIN 300 MG/1
300 CAPSULE ORAL
COMMUNITY
Start: 2016-05-04 | End: 2021-01-14

## 2017-08-14 RX ORDER — HYDROCHLOROTHIAZIDE 12.5 MG/1
12.5 TABLET ORAL
COMMUNITY
End: 2021-11-23

## 2017-08-14 RX ORDER — GABAPENTIN 100 MG/1
100 CAPSULE ORAL
COMMUNITY
Start: 2016-05-04 | End: 2021-01-14

## 2017-08-14 NOTE — PROGRESS NOTES
Subjective    Chief Complaint   Patient presents with   • Gynecologic Exam      History of Present Illness    Consuelo Carlson is a 57 y.o. female who presents for annual exam. Patient had a breast reduction this year and wants a mammogram in November as suggested by her plastic surgeon.  She wants a DEXA scan next year with her annual.  She has had a hysterectomy but has one small ovary.  She is having no problems.  She had a colonoscopy this past year.    Obstetric History:  OB History     No data available         Menstrual History:     No LMP recorded. Patient has had a hysterectomy.       Past Medical History:   Diagnosis Date   • Anxiety and depression    • Arthritis    • DDD (degenerative disc disease), cervical    • Hypertension    • Macular degeneration     RIGHT EYE   • Seasonal allergies    • Spinal stenosis    • Tachycardia      Family History   Problem Relation Age of Onset   • Breast cancer Maternal Aunt        The following portions of the patient's history were reviewed and updated as appropriate: allergies, current medications, past family history, past medical history, past social history, past surgical history and problem list.    Review of Systems   Constitutional: Negative.  Negative for fever and unexpected weight change.   HENT: Negative.    Respiratory: Negative for shortness of breath and wheezing.    Cardiovascular: Negative for chest pain, palpitations and leg swelling.   Gastrointestinal: Negative for abdominal pain, anal bleeding and blood in stool.   Genitourinary: Negative for dysuria, pelvic pain, urgency, vaginal bleeding, vaginal discharge and vaginal pain.   Skin: Negative.    Neurological: Negative.    Hematological: Negative.  Negative for adenopathy.   Psychiatric/Behavioral: Negative.  Negative for dysphoric mood. The patient is not nervous/anxious.             Objective   Physical Exam   Constitutional: She is oriented to person, place, and time. She appears well-developed and  "well-nourished.   HENT:   Head: Normocephalic.   Eyes: Pupils are equal, round, and reactive to light.   Neck: No tracheal deviation present. No thyromegaly present.   Cardiovascular: Normal rate, regular rhythm and normal heart sounds.    No murmur heard.  Pulmonary/Chest: Effort normal and breath sounds normal. No respiratory distress.   Breasts without masses, tenderness or nipple discharge   Abdominal: Soft. She exhibits no mass. There is no hepatosplenomegaly. There is no tenderness. No hernia.   Genitourinary: Rectum normal and vagina normal. No vaginal discharge found.   Genitourinary Comments: External genitalia normal   Lymphadenopathy:     She has no cervical adenopathy.     She has no axillary adenopathy.        Right: No inguinal adenopathy present.        Left: No inguinal adenopathy present.   Neurological: She is alert and oriented to person, place, and time.   Skin: Skin is warm and dry. No rash noted.   Psychiatric: She has a normal mood and affect. Her behavior is normal.   Vitals reviewed.      /74  Ht 64\" (162.6 cm)  Wt 153 lb 9.6 oz (69.7 kg)  BMI 26.37 kg/m2    Assessment/Plan   Consuelo was seen today for gynecologic exam.    Diagnoses and all orders for this visit:    Encounter for gynecological examination without abnormal finding  -     IGP,rfx Aptima HPV All Pth    Menopausal and female climacteric states      Mammogram. RTO 1 year          counseled about calcium and vitamin D and exercise for osteoporosis prevention.    "

## 2017-08-17 LAB
CONV .: NORMAL
CYTOLOGIST CVX/VAG CYTO: NORMAL
CYTOLOGY CVX/VAG DOC THIN PREP: NORMAL
DX ICD CODE: NORMAL
HIV 1 & 2 AB SER-IMP: NORMAL
OTHER STN SPEC: NORMAL
PATH REPORT.FINAL DX SPEC: NORMAL
STAT OF ADQ CVX/VAG CYTO-IMP: NORMAL

## 2017-11-06 ENCOUNTER — PROCEDURE VISIT (OUTPATIENT)
Dept: OBSTETRICS AND GYNECOLOGY | Facility: CLINIC | Age: 57
End: 2017-11-06

## 2017-11-06 DIAGNOSIS — Z13.820 SCREENING FOR OSTEOPOROSIS: Primary | ICD-10-CM

## 2017-11-06 DIAGNOSIS — M85.88 OSTEOPENIA OF OTHER SITE: ICD-10-CM

## 2017-11-06 DIAGNOSIS — Z78.0 POSTMENOPAUSAL: ICD-10-CM

## 2017-11-06 PROCEDURE — 77080 DXA BONE DENSITY AXIAL: CPT | Performed by: OBSTETRICS & GYNECOLOGY

## 2018-10-08 RX ORDER — VALACYCLOVIR HYDROCHLORIDE 500 MG/1
TABLET, FILM COATED ORAL
Qty: 20 TABLET | Refills: 2 | Status: SHIPPED | OUTPATIENT
Start: 2018-10-08 | End: 2021-01-14

## 2018-10-08 RX ORDER — CLOBETASOL PROPIONATE 0.5 MG/G
CREAM TOPICAL
Qty: 30 G | Refills: 4 | Status: SHIPPED | OUTPATIENT
Start: 2018-10-08 | End: 2021-01-14

## 2019-02-04 NOTE — DISCHARGE INSTRUCTIONS
Hospitalist Progress Note Patient: Radha Mullen MRN: [id number]  CSN: [id number] YOB: 1939  Age: 78 y.o. Sex: male DOA: 2/3/2019 LOS:  LOS: 1 day Assessment/Plan Patient Active Problem List  
Diagnosis Code  Parkinson disease ([facility] G20  
 Closed right hip fracture ([facility] S72.001A  GERD (gastroesophageal reflux disease) K21.9  Falls W19. Sheron Shemar  Multiple rib fractures S22.49XA  Parkinson's disease dementia ([facility] Francisca Chavez, F36.80  
  
 
 
 
77 yo male admitted for multiple falls, rib fracture. Rib fracture - pain control Parkinson's disease - continue home medications. PT/OT, discharge planning. Disposition : discharge to SNF Review of systems General: No fevers or chills. Cardiovascular: No chest pain or pressure. No palpitations. Pulmonary: No shortness of breath. Gastrointestinal: No nausea, vomiting. Physical Exam: 
General: Awake, confused   
HEENT: NC, Atraumatic. PERRLA, anicteric sclerae. Lungs: CTA Bilaterally. No Wheezing/Rhonchi/Rales. Heart:  S1 S2,  No murmur, No Rubs, No Gallops Abdomen: Soft, Non distended, Non tender.  +Bowel sounds, Extremities: No c/c/e Psych:   Not anxious or agitated. Neurologic:  No acute neurological deficit. Vital signs/Intake and Output: 
Visit Vitals /64 Pulse 74 Temp 98.5 °F (36.9 °C) Resp 20 Ht 5' 8\" (1.727 m) Wt 75.5 kg (166 lb 8 oz) SpO2 96% BMI 25.32 kg/m² Current Shift:  No intake/output data recorded. Last three shifts:  No intake/output data recorded. Labs: Results:  
   
Chemistry Recent Labs 02/04/19 
0629 02/03/19 
1739 GLU 98 102*  143  
K 3.9 3.9 * 107 CO2 28 31 BUN 16 17 CREA 0.72 0.82 CA 8.2* 8.5 AGAP 5 5 BUCR 22* 21* AP  --  156* TP  --  6.8 ALB  --  3.6 GLOB  --  3.2 AGRAT  --  1.1  
  
CBC w/Diff Recent Labs 02/04/19 
4648 02/03/19 
1739 WBC 7.0 9.4  
RBC 4.27* 4.47* Take the following medications the morning of surgery with a small sip of water.    PANTOPRAZOLE,   GABAPENTIN  METOPROLOL  NORCO--IF NEEDED    General Instructions:  • Do not eat or drink after midnight: includes water, mints, or gum. You may brush your teeth.  • Do not smoke, chew tobacco, or drink alcohol.  • The Pre-Admission Testing nurse will instruct you to bring medications if unable to obtain an accurate list in Pre-Admission Testing.    • If applicable bring your C-PAP/ BI-PAP machine.  • Bring any papers given to you in the doctor’s office.  • Wear clean comfortable clothes and socks.  • Do not wear contact lenses or make-up.  Bring a case for your glasses if applicable.   • Bring crutches or walker if applicable.  • Leave all other valuables and jewelry at home.      Preventing a Surgical Site Infection:  Shower on the morning of surgery using a fresh bar of anti-bacterial soap (such as Dial) and clean washcloth.  Dry with a clean towel and dress in clean clothing.  For 2 to 3 days before surgery, avoid shaving with a razor near where you will have surgery because the razor can irritate skin and make it easier to develop an infection  Ask your surgeon if you will be receiving antibiotics prior to surgery  Make sure you, your family, and all healthcare providers clean their hands with soap and water or an alcohol based hand  before caring for you or your wound  If at all possible, quit smoking as many days before surgery as you can.    Day of surgery: 04- ARRIVE @ 1030 AM REPORT TO THE MAIN OR   Upon arrival, a Pre-op nurse and Anesthesiologist will review your health history, obtain vital signs, and answer questions you may have.  The only belongings needed at this time will be your home medications and if applicable your C-PAP/BI-PAP machine.  If you are staying overnight your family can leave the rest of your belongings in the car and bring them to your room later.  A Pre-op nurse will  HGB 12.9* 13.5 HCT 39.8 42.2  246 GRANS 72 80* LYMPH 15* 10* EOS 3 2 Cardiac Enzymes Recent Labs 02/03/19 
1739  Coagulation No results for input(s): PTP, INR, APTT in the last 72 hours. No lab exists for component: INREXT Lipid Panel No results found for: CHOL, CHOLPOCT, CHOLX, CHLST, CHOLV, 501080, HDL, LDL, LDLC, DLDLP, 855315, VLDLC, VLDL, TGLX, TRIGL, TRIGP, TGLPOCT, CHHD, CHHDX  
BNP No results for input(s): BNPP in the last 72 hours. Liver Enzymes Recent Labs 02/03/19 
1739 TP 6.8 ALB 3.6 * SGOT 15 Thyroid Studies No results found for: T4, T3U, TSH, TSHEXT Procedures/imaging: see electronic medical records for all procedures/Xrays and details which were not copied into this note but were reviewed prior to creation of Plan start an IV and you may receive medication in preparation for surgery, including something to help you relax.  Your family will be able to see you in the Pre-op area.  While you are in surgery your family should notify the waiting room  if they leave the waiting room area and provide a contact phone number.    Please be aware that surgery does come with discomfort.  We want to make every effort to control your discomfort so please discuss any uncontrolled symptoms with your nurse.   Your doctor will most likely have prescribed pain medications.      If you are going home after surgery you will receive individualized written care instructions before being discharged.  A responsible adult must drive you to and from the hospital on the day of your surgery and stay with you for 24 hours.    If you are staying overnight following surgery, you will be transported to your hospital room following the recovery period.  [facility] has all private rooms.    If you have any questions please call Pre-Admission Testing at [phone].  Deductibles and co-payments are collected on the day of service. Please be prepared to pay the required co-pay, deductible or deposit on the day of service as defined by your plan.

## 2019-03-26 ENCOUNTER — HOSPITAL ENCOUNTER (OUTPATIENT)
Dept: OTHER | Facility: HOSPITAL | Age: 59
Discharge: HOME | End: 2019-03-26

## 2019-08-29 ENCOUNTER — OFFICE VISIT (OUTPATIENT)
Dept: OBSTETRICS AND GYNECOLOGY | Facility: CLINIC | Age: 59
End: 2019-08-29

## 2019-08-29 ENCOUNTER — APPOINTMENT (OUTPATIENT)
Dept: WOMENS IMAGING | Facility: HOSPITAL | Age: 59
End: 2019-08-29

## 2019-08-29 VITALS
HEIGHT: 64 IN | DIASTOLIC BLOOD PRESSURE: 70 MMHG | WEIGHT: 152 LBS | SYSTOLIC BLOOD PRESSURE: 107 MMHG | BODY MASS INDEX: 25.95 KG/M2

## 2019-08-29 DIAGNOSIS — M85.80 OSTEOPENIA, UNSPECIFIED LOCATION: ICD-10-CM

## 2019-08-29 DIAGNOSIS — Z01.419 ENCOUNTER FOR GYNECOLOGICAL EXAMINATION WITHOUT ABNORMAL FINDING: Primary | ICD-10-CM

## 2019-08-29 DIAGNOSIS — N76.3 CHRONIC VULVITIS: ICD-10-CM

## 2019-08-29 DIAGNOSIS — N95.1 CLIMACTERIC: ICD-10-CM

## 2019-08-29 LAB
DEVELOPER EXPIRATION DATE: NORMAL
DEVELOPER LOT NUMBER: NORMAL
EXPIRATION DATE: NORMAL
FECAL OCCULT BLOOD SCREEN, POC: NEGATIVE
Lab: NORMAL
NEGATIVE CONTROL: NEGATIVE
POSITIVE CONTROL: POSITIVE

## 2019-08-29 PROCEDURE — G0101 CA SCREEN;PELVIC/BREAST EXAM: HCPCS | Performed by: OBSTETRICS & GYNECOLOGY

## 2019-08-29 PROCEDURE — 77067 SCR MAMMO BI INCL CAD: CPT | Performed by: RADIOLOGY

## 2019-08-29 PROCEDURE — 77063 BREAST TOMOSYNTHESIS BI: CPT | Performed by: RADIOLOGY

## 2019-08-29 PROCEDURE — G0328 FECAL BLOOD SCRN IMMUNOASSAY: HCPCS | Performed by: OBSTETRICS & GYNECOLOGY

## 2019-08-29 RX ORDER — REPAGLINIDE 2 MG/1
TABLET ORAL
Refills: 0 | COMMUNITY
Start: 2019-08-14 | End: 2021-01-14

## 2019-08-29 RX ORDER — LEVOCETIRIZINE DIHYDROCHLORIDE 5 MG/1
TABLET, FILM COATED ORAL
COMMUNITY
End: 2021-01-14

## 2019-08-29 RX ORDER — MONTELUKAST SODIUM 10 MG/1
10 TABLET ORAL DAILY
Refills: 3 | COMMUNITY
Start: 2019-06-20 | End: 2021-01-14

## 2019-08-29 RX ORDER — CYCLOBENZAPRINE HCL 10 MG
TABLET ORAL
COMMUNITY
Start: 2019-02-13 | End: 2021-01-14

## 2019-08-29 RX ORDER — CLOBETASOL PROPIONATE 0.5 MG/G
CREAM TOPICAL EVERY OTHER DAY
Qty: 15 G | Refills: 3 | Status: SHIPPED | OUTPATIENT
Start: 2019-08-29 | End: 2020-09-10

## 2019-08-29 RX ORDER — TEMAZEPAM 30 MG/1
CAPSULE ORAL
Refills: 2 | COMMUNITY
Start: 2019-08-14 | End: 2021-11-23 | Stop reason: SDUPTHER

## 2019-08-29 RX ORDER — METFORMIN HYDROCHLORIDE 500 MG/1
TABLET, EXTENDED RELEASE ORAL
COMMUNITY
Start: 2019-08-06 | End: 2021-01-14

## 2019-08-29 NOTE — PROGRESS NOTES
Subjective    Chief Complaint   Patient presents with   • Gynecologic Exam     AE      History of Present Illness    Consuelo Carlson is a 59 y.o. female who presents for annual exam.  Non-smoker.  DEXA scan 2017 showed osteopenia.  Colonoscopy was in .  Previous hysterectomy but has one ovary.  On clobetasol 0.05% every other day for chronic vulvitis which is doing well.    Obstetric History:  OB History      Para Term  AB Living    2 2 2          SAB TAB Ectopic Molar Multiple Live Births                        Menstrual History:     No LMP recorded. Patient has had a hysterectomy.       Past Medical History:   Diagnosis Date   • Anxiety and depression    • Arthritis    • DDD (degenerative disc disease), cervical    • Diabetes mellitus (CMS/HCC)    • Hypertension    • Macular degeneration     RIGHT EYE   • Seasonal allergies    • Spinal stenosis    • Tachycardia      Family History   Problem Relation Age of Onset   • Breast cancer Maternal Aunt      Social History     Tobacco Use   Smoking Status Never Smoker   Smokeless Tobacco Never Used     [unfilled]    The following portions of the patient's history were reviewed and updated as appropriate: allergies, current medications, past family history, past medical history, past social history, past surgical history and problem list.    Review of Systems   Constitutional: Negative.  Negative for fever and unexpected weight change.   HENT: Negative.    Respiratory: Negative for shortness of breath and wheezing.    Cardiovascular: Negative for chest pain, palpitations and leg swelling.   Gastrointestinal: Negative for abdominal pain, anal bleeding and blood in stool.   Genitourinary: Negative for dysuria, pelvic pain, urgency, vaginal bleeding, vaginal discharge and vaginal pain.   Skin: Negative.    Neurological: Negative.    Hematological: Negative.  Negative for adenopathy.   Psychiatric/Behavioral: Negative.  Negative for dysphoric mood.  "The patient is not nervous/anxious.             Objective   Physical Exam   Constitutional: She is oriented to person, place, and time. She appears well-developed and well-nourished.   HENT:   Head: Normocephalic.   Eyes: Pupils are equal, round, and reactive to light.   Neck: No tracheal deviation present. No thyromegaly present.   Cardiovascular: Normal rate, regular rhythm and normal heart sounds.   No murmur heard.  Pulmonary/Chest: Effort normal and breath sounds normal. No respiratory distress.   Breasts without masses, tenderness or nipple discharge   Abdominal: Soft. She exhibits no mass. There is no hepatosplenomegaly. There is no tenderness. No hernia.   Genitourinary: Rectum normal and vagina normal. Rectal exam shows guaiac negative stool. No vaginal discharge found.   Genitourinary Comments: External genitalia normal   Lymphadenopathy:     She has no cervical adenopathy.     She has no axillary adenopathy.        Right: No inguinal adenopathy present.        Left: No inguinal adenopathy present.   Neurological: She is alert and oriented to person, place, and time.   Skin: Skin is warm and dry. No rash noted.   Psychiatric: She has a normal mood and affect. Her behavior is normal.   Vitals reviewed.      /70   Ht 162.6 cm (64\")   Wt 68.9 kg (152 lb)   BMI 26.09 kg/m²     Assessment/Plan   Consuelo was seen today for gynecologic exam.    Diagnoses and all orders for this visit:    Encounter for gynecological examination without abnormal finding  -     IGP,rfx Aptima HPV All Pth  -     POC Occult Blood Stool    Osteopenia, unspecified location    Climacteric    Chronic vulvitis    Other orders  -     clobetasol (TEMOVATE) 0.05 % cream; Apply  topically to the appropriate area as directed Every Other Day.        Mammogram.   Return to office 1 year.        Counseled about continuing calcium with vitamin D and repeating DEXA scan in 1 year.       "

## 2019-09-02 LAB
CONV .: NORMAL
CYTOLOGIST CVX/VAG CYTO: NORMAL
CYTOLOGY CVX/VAG DOC CYTO: NORMAL
CYTOLOGY CVX/VAG DOC THIN PREP: NORMAL
DX ICD CODE: NORMAL
HIV 1 & 2 AB SER-IMP: NORMAL
OTHER STN SPEC: NORMAL
STAT OF ADQ CVX/VAG CYTO-IMP: NORMAL

## 2020-06-19 ENCOUNTER — OFFICE VISIT CONVERTED (OUTPATIENT)
Dept: FAMILY MEDICINE CLINIC | Facility: CLINIC | Age: 60
End: 2020-06-19
Attending: FAMILY MEDICINE

## 2020-09-10 RX ORDER — CLOBETASOL PROPIONATE 0.5 MG/G
CREAM TOPICAL EVERY OTHER DAY
Qty: 15 G | Refills: 2 | Status: SHIPPED | OUTPATIENT
Start: 2020-09-10 | End: 2021-05-24 | Stop reason: SDUPTHER

## 2020-09-11 ENCOUNTER — OFFICE VISIT CONVERTED (OUTPATIENT)
Dept: FAMILY MEDICINE CLINIC | Facility: CLINIC | Age: 60
End: 2020-09-11
Attending: FAMILY MEDICINE

## 2020-09-11 ENCOUNTER — HOSPITAL ENCOUNTER (OUTPATIENT)
Dept: GENERAL RADIOLOGY | Facility: HOSPITAL | Age: 60
Discharge: HOME | End: 2020-09-11
Attending: FAMILY MEDICINE

## 2020-09-11 LAB
EST. AVERAGE GLUCOSE BLD GHB EST-MCNC: 143 MG/DL
HBA1C MFR BLD: 6.6 % (ref 3.5–5.7)

## 2020-09-12 LAB
25(OH)D3 SERPL-MCNC: 29.2 NG/ML (ref 30–100)
ALBUMIN SERPL-MCNC: 4.4 G/DL (ref 3.5–5)
ALBUMIN/GLOB SERPL: 1.9 {RATIO} (ref 1.4–2.6)
ALP SERPL-CCNC: 88 U/L (ref 43–160)
ALT SERPL-CCNC: 16 U/L (ref 10–40)
ANION GAP SERPL CALC-SCNC: 17 MMOL/L (ref 8–19)
AST SERPL-CCNC: 17 U/L (ref 15–50)
BASOPHILS # BLD AUTO: 0.05 10*3/UL (ref 0–0.2)
BASOPHILS NFR BLD AUTO: 1 % (ref 0–3)
BILIRUB SERPL-MCNC: 0.55 MG/DL (ref 0.2–1.3)
BUN SERPL-MCNC: 18 MG/DL (ref 5–25)
BUN/CREAT SERPL: 23 {RATIO} (ref 6–20)
CALCIUM SERPL-MCNC: 9.1 MG/DL (ref 8.7–10.4)
CHLORIDE SERPL-SCNC: 99 MMOL/L (ref 99–111)
CHOLEST SERPL-MCNC: 251 MG/DL (ref 107–200)
CHOLEST/HDLC SERPL: 6.6 {RATIO} (ref 3–6)
CONV ABS IMM GRAN: 0.01 10*3/UL (ref 0–0.2)
CONV CO2: 25 MMOL/L (ref 22–32)
CONV IMMATURE GRAN: 0.2 % (ref 0–1.8)
CONV TOTAL PROTEIN: 6.7 G/DL (ref 6.3–8.2)
CREAT UR-MCNC: 0.78 MG/DL (ref 0.5–0.9)
DEPRECATED RDW RBC AUTO: 43.8 FL (ref 36.4–46.3)
EOSINOPHIL # BLD AUTO: 0.42 10*3/UL (ref 0–0.7)
EOSINOPHIL # BLD AUTO: 8.8 % (ref 0–7)
ERYTHROCYTE [DISTWIDTH] IN BLOOD BY AUTOMATED COUNT: 13.1 % (ref 11.7–14.4)
FOLATE SERPL-MCNC: 12.2 NG/ML (ref 4.8–20)
GFR SERPLBLD BASED ON 1.73 SQ M-ARVRAT: >60 ML/MIN/{1.73_M2}
GLOBULIN UR ELPH-MCNC: 2.3 G/DL (ref 2–3.5)
GLUCOSE SERPL-MCNC: 128 MG/DL (ref 65–99)
HCT VFR BLD AUTO: 44.2 % (ref 37–47)
HDLC SERPL-MCNC: 38 MG/DL (ref 40–60)
HGB BLD-MCNC: 14.6 G/DL (ref 12–16)
IRON SATN MFR SERPL: 25 % (ref 20–55)
IRON SERPL-MCNC: 114 UG/DL (ref 60–170)
LDLC SERPL CALC-MCNC: 121 MG/DL (ref 70–100)
LYMPHOCYTES # BLD AUTO: 1.29 10*3/UL (ref 1–5)
LYMPHOCYTES NFR BLD AUTO: 27 % (ref 20–45)
MCH RBC QN AUTO: 30 PG (ref 27–31)
MCHC RBC AUTO-ENTMCNC: 33 G/DL (ref 33–37)
MCV RBC AUTO: 90.9 FL (ref 81–99)
MONOCYTES # BLD AUTO: 0.3 10*3/UL (ref 0.2–1.2)
MONOCYTES NFR BLD AUTO: 6.3 % (ref 3–10)
NEUTROPHILS # BLD AUTO: 2.71 10*3/UL (ref 2–8)
NEUTROPHILS NFR BLD AUTO: 56.7 % (ref 30–85)
NRBC CBCN: 0 % (ref 0–0.7)
OSMOLALITY SERPL CALC.SUM OF ELEC: 288 MOSM/KG (ref 273–304)
PLATELET # BLD AUTO: 165 10*3/UL (ref 130–400)
PMV BLD AUTO: 12.1 FL (ref 9.4–12.3)
POTASSIUM SERPL-SCNC: 4.3 MMOL/L (ref 3.5–5.3)
RBC # BLD AUTO: 4.86 10*6/UL (ref 4.2–5.4)
SODIUM SERPL-SCNC: 137 MMOL/L (ref 135–147)
T4 FREE SERPL-MCNC: 1.1 NG/DL (ref 0.9–1.8)
TIBC SERPL-MCNC: 455 UG/DL (ref 245–450)
TRANSFERRIN SERPL-MCNC: 318 MG/DL (ref 250–380)
TRIGL SERPL-MCNC: 550 MG/DL (ref 40–150)
TSH SERPL-ACNC: 2.97 M[IU]/L (ref 0.27–4.2)
VIT B12 SERPL-MCNC: 206 PG/ML (ref 211–911)
WBC # BLD AUTO: 4.78 10*3/UL (ref 4.8–10.8)

## 2020-12-11 ENCOUNTER — HOSPITAL ENCOUNTER (OUTPATIENT)
Dept: GENERAL RADIOLOGY | Facility: HOSPITAL | Age: 60
Discharge: HOME | End: 2020-12-11
Attending: FAMILY MEDICINE

## 2020-12-11 LAB
ALBUMIN SERPL-MCNC: 4.3 G/DL (ref 3.5–5)
ALBUMIN/GLOB SERPL: 2 {RATIO} (ref 1.4–2.6)
ALP SERPL-CCNC: 104 U/L (ref 43–160)
ALT SERPL-CCNC: 20 U/L (ref 10–40)
ANION GAP SERPL CALC-SCNC: 18 MMOL/L (ref 8–19)
AST SERPL-CCNC: 18 U/L (ref 15–50)
BILIRUB SERPL-MCNC: 0.67 MG/DL (ref 0.2–1.3)
BUN SERPL-MCNC: 13 MG/DL (ref 5–25)
BUN/CREAT SERPL: 19 {RATIO} (ref 6–20)
CALCIUM SERPL-MCNC: 9.5 MG/DL (ref 8.7–10.4)
CHLORIDE SERPL-SCNC: 101 MMOL/L (ref 99–111)
CHOLEST SERPL-MCNC: 105 MG/DL (ref 107–200)
CHOLEST/HDLC SERPL: 2.6 {RATIO} (ref 3–6)
CONV CO2: 26 MMOL/L (ref 22–32)
CONV TOTAL PROTEIN: 6.5 G/DL (ref 6.3–8.2)
CREAT UR-MCNC: 0.7 MG/DL (ref 0.5–0.9)
EST. AVERAGE GLUCOSE BLD GHB EST-MCNC: 160 MG/DL
GFR SERPLBLD BASED ON 1.73 SQ M-ARVRAT: >60 ML/MIN/{1.73_M2}
GLOBULIN UR ELPH-MCNC: 2.2 G/DL (ref 2–3.5)
GLUCOSE SERPL-MCNC: 105 MG/DL (ref 65–99)
HBA1C MFR BLD: 7.2 % (ref 3.5–5.7)
HDLC SERPL-MCNC: 41 MG/DL (ref 40–60)
LDLC SERPL CALC-MCNC: 33 MG/DL (ref 70–100)
OSMOLALITY SERPL CALC.SUM OF ELEC: 292 MOSM/KG (ref 273–304)
POTASSIUM SERPL-SCNC: 4 MMOL/L (ref 3.5–5.3)
SODIUM SERPL-SCNC: 141 MMOL/L (ref 135–147)
TRIGL SERPL-MCNC: 155 MG/DL (ref 40–150)
VIT B12 SERPL-MCNC: 475 PG/ML (ref 211–911)
VLDLC SERPL-MCNC: 31 MG/DL (ref 5–37)

## 2020-12-18 ENCOUNTER — TELEPHONE CONVERTED (OUTPATIENT)
Dept: FAMILY MEDICINE CLINIC | Facility: CLINIC | Age: 60
End: 2020-12-18
Attending: FAMILY MEDICINE

## 2021-01-14 ENCOUNTER — APPOINTMENT (OUTPATIENT)
Dept: WOMENS IMAGING | Facility: HOSPITAL | Age: 61
End: 2021-01-14

## 2021-01-14 ENCOUNTER — OFFICE VISIT (OUTPATIENT)
Dept: OBSTETRICS AND GYNECOLOGY | Facility: CLINIC | Age: 61
End: 2021-01-14

## 2021-01-14 ENCOUNTER — PROCEDURE VISIT (OUTPATIENT)
Dept: OBSTETRICS AND GYNECOLOGY | Facility: CLINIC | Age: 61
End: 2021-01-14

## 2021-01-14 VITALS
SYSTOLIC BLOOD PRESSURE: 122 MMHG | DIASTOLIC BLOOD PRESSURE: 78 MMHG | HEIGHT: 64 IN | WEIGHT: 144 LBS | BODY MASS INDEX: 24.59 KG/M2

## 2021-01-14 DIAGNOSIS — Z12.31 VISIT FOR SCREENING MAMMOGRAM: Primary | ICD-10-CM

## 2021-01-14 DIAGNOSIS — Z12.39 ENCOUNTER FOR BREAST CANCER SCREENING USING NON-MAMMOGRAM MODALITY: ICD-10-CM

## 2021-01-14 DIAGNOSIS — Z01.419 ENCOUNTER FOR GYNECOLOGICAL EXAMINATION WITHOUT ABNORMAL FINDING: Primary | ICD-10-CM

## 2021-01-14 DIAGNOSIS — M85.80 OSTEOPENIA, UNSPECIFIED LOCATION: ICD-10-CM

## 2021-01-14 DIAGNOSIS — N76.3 CHRONIC VULVITIS: ICD-10-CM

## 2021-01-14 DIAGNOSIS — M85.852 OTHER SPECIFIED DISORDERS OF BONE DENSITY AND STRUCTURE, LEFT THIGH: ICD-10-CM

## 2021-01-14 DIAGNOSIS — Z78.0 POST-MENOPAUSAL: ICD-10-CM

## 2021-01-14 PROCEDURE — 77063 BREAST TOMOSYNTHESIS BI: CPT | Performed by: OBSTETRICS & GYNECOLOGY

## 2021-01-14 PROCEDURE — 77063 BREAST TOMOSYNTHESIS BI: CPT | Performed by: RADIOLOGY

## 2021-01-14 PROCEDURE — 77067 SCR MAMMO BI INCL CAD: CPT | Performed by: OBSTETRICS & GYNECOLOGY

## 2021-01-14 PROCEDURE — G0101 CA SCREEN;PELVIC/BREAST EXAM: HCPCS | Performed by: OBSTETRICS & GYNECOLOGY

## 2021-01-14 PROCEDURE — 77067 SCR MAMMO BI INCL CAD: CPT | Performed by: RADIOLOGY

## 2021-01-14 PROCEDURE — G0328 FECAL BLOOD SCRN IMMUNOASSAY: HCPCS | Performed by: OBSTETRICS & GYNECOLOGY

## 2021-01-14 RX ORDER — ATORVASTATIN CALCIUM 40 MG/1
TABLET, FILM COATED ORAL
COMMUNITY
End: 2021-11-23 | Stop reason: SDUPTHER

## 2021-01-14 RX ORDER — METOPROLOL SUCCINATE 50 MG/1
TABLET, EXTENDED RELEASE ORAL
COMMUNITY
End: 2021-09-13

## 2021-01-14 RX ORDER — GABAPENTIN 300 MG/1
CAPSULE ORAL EVERY 8 HOURS SCHEDULED
COMMUNITY
End: 2021-11-23 | Stop reason: ALTCHOICE

## 2021-01-14 RX ORDER — ASPIRIN 81 MG/1
81 TABLET ORAL DAILY
COMMUNITY
End: 2021-11-23 | Stop reason: SDUPTHER

## 2021-01-14 RX ORDER — LOSARTAN POTASSIUM 25 MG/1
TABLET ORAL
COMMUNITY
End: 2021-11-23 | Stop reason: SDUPTHER

## 2021-01-14 RX ORDER — METFORMIN HYDROCHLORIDE EXTENDED-RELEASE TABLETS 1000 MG/1
TABLET, FILM COATED, EXTENDED RELEASE ORAL EVERY 12 HOURS SCHEDULED
COMMUNITY
End: 2021-07-20

## 2021-01-14 RX ORDER — VENLAFAXINE HYDROCHLORIDE 150 MG/1
CAPSULE, EXTENDED RELEASE ORAL
COMMUNITY
End: 2021-11-23 | Stop reason: SDUPTHER

## 2021-01-14 NOTE — PROGRESS NOTES
Subjective    Chief Complaint   Patient presents with   • Gynecologic Exam     AE      History of Present Illness    Consuelo Carlson is a 60 y.o. female who presents for annual exam.  Non-smoker.  Mammogram today.  DEXA scan 2017 showed osteopenia.  Scheduling another DEXA scan soon.  Colonoscopy in .  Next one due in 10 years.  Uses occasional clobetasol 0.05% cream for chronic vulvitis.  Previous hysterectomy with removal of 1 ovary.    Obstetric History:  OB History        2    Para   2    Term   2            AB        Living           SAB        TAB        Ectopic        Molar        Multiple        Live Births                   Menstrual History:     No LMP recorded. Patient has had a hysterectomy.       Past Medical History:   Diagnosis Date   • Anxiety and depression    • Arthritis    • DDD (degenerative disc disease), cervical    • Diabetes mellitus (CMS/HCC)    • Hypertension    • Macular degeneration     RIGHT EYE   • Seasonal allergies    • Spinal stenosis    • Tachycardia      Family History   Problem Relation Age of Onset   • Breast cancer Maternal Aunt      Social History     Tobacco Use   Smoking Status Never Smoker   Smokeless Tobacco Never Used         The following portions of the patient's history were reviewed and updated as appropriate: allergies, current medications, past family history, past medical history, past social history, past surgical history and problem list.    Review of Systems   Constitutional: Negative.  Negative for fever and unexpected weight change.   HENT: Negative.    Respiratory: Negative for shortness of breath and wheezing.    Cardiovascular: Negative for chest pain, palpitations and leg swelling.   Gastrointestinal: Negative for abdominal pain, anal bleeding and blood in stool.   Genitourinary: Negative for dysuria, pelvic pain, urgency, vaginal bleeding, vaginal discharge and vaginal pain.   Skin: Negative.    Neurological: Negative.   "  Hematological: Negative.  Negative for adenopathy.   Psychiatric/Behavioral: Negative.  Negative for dysphoric mood. The patient is not nervous/anxious.             Objective   Physical Exam  Vitals signs reviewed. Exam conducted with a chaperone present.   Constitutional:       Appearance: She is well-developed.   HENT:      Head: Normocephalic.   Eyes:      Pupils: Pupils are equal, round, and reactive to light.   Neck:      Thyroid: No thyromegaly.      Trachea: No tracheal deviation.   Cardiovascular:      Rate and Rhythm: Normal rate and regular rhythm.      Heart sounds: Normal heart sounds. No murmur.   Pulmonary:      Effort: Pulmonary effort is normal. No respiratory distress.      Breath sounds: Normal breath sounds.   Chest:      Breasts:         Right: Normal. No mass, nipple discharge or tenderness.         Left: Normal. No mass, nipple discharge or tenderness.   Abdominal:      Palpations: Abdomen is soft. There is no mass.      Tenderness: There is no abdominal tenderness.      Hernia: No hernia is present.   Genitourinary:     General: Normal vulva.      Labia:         Right: No tenderness or lesion.         Left: No tenderness or lesion.       Urethra: No prolapse or urethral lesion.      Vagina: Normal. No vaginal discharge.      Uterus: Absent.       Adnexa:         Right: No fullness.          Left: No fullness.        Rectum: Normal. Guaiac result negative. No external hemorrhoid or internal hemorrhoid. Normal anal tone.      Comments: External genitalia normal  Lymphadenopathy:      Cervical: No cervical adenopathy.      Upper Body:      Right upper body: No axillary adenopathy.      Left upper body: No axillary adenopathy.   Skin:     General: Skin is warm and dry.      Findings: No rash.   Neurological:      Mental Status: She is alert and oriented to person, place, and time.   Psychiatric:         Behavior: Behavior normal.         /78   Ht 162.6 cm (64\")   Wt 65.3 kg (144 lb)   " BMI 24.72 kg/m²     Assessment/Plan   Diagnoses and all orders for this visit:    1. Encounter for gynecological examination without abnormal finding (Primary)  -     IGP,rfx Aptima HPV All Pth  -     POC Occult Blood Stool    2. Encounter for breast cancer screening using non-mammogram modality    3. Osteopenia, unspecified location  -     DEXA Bone Density Axial; Future    4. Chronic vulvitis    5. Other specified disorders of bone density and structure, left thigh   -     DEXA Bone Density Axial; Future        Mammogram.  Return to office 1 year.  Counseled about continuing calcium with vitamin D and rechecking DEXA scan.  Discussed continuing twice weekly clobetasol 0.05% cream.

## 2021-01-19 LAB
CONV .: NORMAL
CYTOLOGIST CVX/VAG CYTO: NORMAL
CYTOLOGY CVX/VAG DOC CYTO: NORMAL
CYTOLOGY CVX/VAG DOC THIN PREP: NORMAL
DX ICD CODE: NORMAL
HIV 1 & 2 AB SER-IMP: NORMAL
Lab: NORMAL
OTHER STN SPEC: NORMAL
STAT OF ADQ CVX/VAG CYTO-IMP: NORMAL

## 2021-01-26 ENCOUNTER — HOSPITAL ENCOUNTER (OUTPATIENT)
Dept: MAMMOGRAPHY | Facility: HOSPITAL | Age: 61
Discharge: HOME | End: 2021-01-26
Attending: OBSTETRICS & GYNECOLOGY

## 2021-03-12 DIAGNOSIS — M85.80 OSTEOPENIA, UNSPECIFIED LOCATION: ICD-10-CM

## 2021-03-12 DIAGNOSIS — M85.852 OTHER SPECIFIED DISORDERS OF BONE DENSITY AND STRUCTURE, LEFT THIGH: ICD-10-CM

## 2021-03-12 DIAGNOSIS — Z78.0 POST-MENOPAUSAL: ICD-10-CM

## 2021-03-16 ENCOUNTER — TELEPHONE (OUTPATIENT)
Dept: OBSTETRICS AND GYNECOLOGY | Facility: CLINIC | Age: 61
End: 2021-03-16

## 2021-03-16 NOTE — PROGRESS NOTES
Please tell pt Dexascan still shows normal spine and mild osteopenia of hip with little change.  Continue Ca and Vit D and will recheck in 2 years. Thanks KILO

## 2021-03-16 NOTE — TELEPHONE ENCOUNTER
----- Message from Boris Barros MD sent at 3/16/2021  7:30 AM EDT -----  Please tell pt Dexascan still shows normal spine and mild osteopenia of hip with little change.  Continue Ca and Vit D and will recheck in 2 years. Thanks KILO

## 2021-05-10 NOTE — H&P
History and Physical      Patient Name: Consuelo Carlson   Patient ID: [id number]   Sex: Female   YOB: 1960        Visit Date: June 19, 2020    Provider: Paul Choi DO   Location: [facility]   Location Address: [address] [place] [address] [address]  [place], [place]  [id number]   Location Phone: [phone]          Chief Complaint  · establish care      History Of Present Illness  Consuelo Carlson is a 60 year old /White female who presents for evaluation and treatment of:        Patient presents to establish care, PMH significant for T2DM HTN HLD Depression GERD insomnia. She takes restoril, has been on for a few years and doesn't need or take every night. CATALINA reviewed, did not understand risks at first reviewed and would be agreeable to a different medicine in the future if needed. Otherwise doing well no chest pain palpitations headaches, feels her blood sugar runs lowers on glyburide would like to hold last a1c <6.5, no vision changes but has neuropathy.     On Lisinopril but states she has had this chronic dry cough, open to trying different BP med if Lisinopril could be causing.       Past Medical History  Disease Name Date Onset Notes   Depression --  --    GERD (gastroesophageal reflux disease) without esophagitis --  --    HTN (hypertension) --  --    Insomnia --  --    Long term use of drug --  --    Neuropathy --  --    Pap Smear for Vaginal Cancer Screening 2019 --    Screening for breast cancer 2019 --    T2DM (type 2 diabetes mellitus) --  --          Past Surgical History  Procedure Name Date Notes   Back surgery --  --    breast reduction --  --    C section --  --    Hysterectomy --  --    Knee surgery --  --    Neck Surgery --  --    Tubal ligation --  --          Medication List  Name Date Started Instructions   Esgic -40 mg oral tablet  take 1 tablet by oral route every 4 hours as needed not to exceed 6 tablets per 24hrs   gabapentin 600 mg  oral tablet  take 1 tablet (600 mg) by oral route 3 times per day   hydrochlorothiazide 12.5 mg oral tablet  take 1 tablet (12.5 mg) by oral route once daily   losartan 25 mg oral tablet 06/19/2020 take 1 tablet (25 mg) by oral route once daily for 90 days   metformin 1,000 mg oral tablet extended release 24hr 06/19/2020 take 1 tablet (1,000 mg) by oral route 2 times per day with meals for 90 days   pantoprazole 40 mg oral tablet,delayed release (DR/EC)  take 1 tablet (40 mg) by oral route once daily   temazepam 30 mg oral capsule 06/19/2020 take 1 capsule (30 mg) by oral route once daily at bedtime as needed for 30 days   Toprol XL 50 mg oral tablet extended release 24 hr  take 1 tablet (50 mg) by oral route once daily   venlafaxine 150 mg oral tablet extended release 24hr 06/19/2020 take 1 tablet (150 mg) by oral route once daily in the morning at the same time each day with food for 90 days         Allergy List  Allergen Name Date Reaction Notes   Latex --  --  --    NO KNOWN DRUG ALLERGIES --  --  --          Social History  Finding Status Start/Stop Quantity Notes   Alcohol Never --/-- --  --    Disabled --  --/-- --  --    lives with spouse --  --/-- --  --    Retired --  --/-- --  --    Substance Abuse Never --/-- --  --    Tobacco Never --/-- --  --          Review of Systems  · Constitutional  o Denies  o : fever, fatigue, weight loss, weight gain  · HENT  o Denies  o : sinus pain, nasal congestion, nasal discharge, postnasal drip  · Cardiovascular  o Denies  o : lower extremity edema, claudication, chest pressure, palpitations  · Respiratory  o Denies  o : shortness of breath, wheezing, cough, hemoptysis, dyspnea on exertion  · Gastrointestinal  o Denies  o : nausea, vomiting, diarrhea, constipation, abdominal pain  · Integument  o Denies  o : rash, itching  · Musculoskeletal  o Denies  o : joint pain, joint swelling  · Psychiatric  o Denies  o : anxiety, depression      Vitals  Date Time BP Position Site  "L\R Cuff Size HR RR TEMP (F) WT  HT  BMI kg/m2 BSA m2 O2 Sat HC       06/19/2020 02:59 /74 Sitting    76 - R 14 95 151lbs 4oz 5'  4\" 25.96 1.76 98 %          Physical Examination  · Constitutional  o Appearance  o : no acute distress, well-nourished  · Head and Face  o Head  o :   § Inspection  § : atraumatic, normocephalic  · Eyes  o Eyes  o : extraocular movements intact, no scleral icterus, no conjunctival injection  · Ears, Nose, Mouth and Throat  o Ears  o :   § External Ears  § : normal  o Nose  o :   § Intranasal Exam  § : nares patent  o Oral Cavity  o :   § Oral Mucosa  § : moist mucous membranes  · Respiratory  o Respiratory Effort  o : breathing comfortably, symmetric chest rise  o Auscultation of Lungs  o : clear to asculatation bilaterally, no wheezes, rales, or rhonchii  · Cardiovascular  o Heart  o :   § Auscultation of Heart  § : regular rate and rhythm, no murmurs, rubs, or gallops  o Peripheral Vascular System  o :   § Extremities  § : no edema  · Neurologic  o Mental Status Examination  o :   § Orientation  § : grossly oriented to person, place and time  o Gait and Station  o :   § Gait Screening  § : normal gait  · Psychiatric  o General  o : normal mood and affect          Assessment  · Diabetes mellitus, type 2     250.00/E11.9  · Screening for depression     V79.0/Z13.89  · Establishing care with new doctor, encounter for     V65.8/Z76.89  · Chronic pain     338.29/G89.29  · Insomnia     780.52/G47.00  · Long term use of drug     V58.69/Z79.899          "

## 2021-05-13 NOTE — PROGRESS NOTES
Progress Note      Patient Name: Consuelo Carlson   Patient ID: [id number]   Sex: Female   YOB: 1960    Primary Care Provider: Paul Choi DO   Referring Provider: Paul Choi DO    Visit Date: September 11, 2020    Provider: Paul Choi DO   Location: [facility]   Location Address: [address] [place] [address]  [address] [place] [address]  [id number]   Location Phone: [phone]          Chief Complaint  · Blood sugar has been running in the 140-190 range  · Has no energy, can lay down for a nap to feel better and will sleep for several hours.   · Having dizziness at times that isn't normal for her.   · Head sweating more than normal for awhile now.       History Of Present Illness  Consuelo Carlson is a 60 year old /White female who presents for evaluation and treatment of:        Patient last appt was to establish care 6/2020 presents with fatigue today and high blood sugar.  Is taking metformin 500 mg twice daily additionally temazepam losartan Effexor gabapentin Protonix Toprol.      Complaining of fatigue does take Restoril for sleep not had any recent labs, denies depression or other also no chest palpitations headache.     Complaining intermittent sweating that can happen at times almost like hot flashes         Past Medical History  Disease Name Date Onset Notes   Depression --  --    GERD (gastroesophageal reflux disease) without esophagitis --  --    HTN (hypertension) --  --    Insomnia --  --    Long term use of drug --  --    Neuropathy --  --    Pap Smear for Vaginal Cancer Screening 2019 --    Screening for breast cancer 2019 --    T2DM (type 2 diabetes mellitus) --  --          Past Surgical History  Procedure Name Date Notes   Back surgery --  --    breast reduction --  --    C section --  --    Hysterectomy --  --    Knee surgery --  --    Neck Surgery --  --    Tubal ligation --  --          Medication List  Name Date Started Instructions    Esgic -40 mg oral tablet  take 1 tablet by oral route every 4 hours as needed not to exceed 6 tablets per 24hrs   gabapentin 600 mg oral tablet  take 1 tablet (600 mg) by oral route 3 times per day   hydrochlorothiazide 12.5 mg oral tablet  take 1 tablet (12.5 mg) by oral route once daily   losartan 25 mg oral tablet 06/19/2020 take 1 tablet (25 mg) by oral route once daily for 90 days   metformin 1,000 mg oral tablet extended release 24hr 06/19/2020 take 1 tablet (1,000 mg) by oral route 2 times per day with meals for 90 days   pantoprazole 40 mg oral tablet,delayed release (DR/EC)  take 1 tablet (40 mg) by oral route once daily   temazepam 30 mg oral capsule 06/19/2020 take 1 capsule (30 mg) by oral route once daily at bedtime as needed for 30 days   Toprol XL 50 mg oral tablet extended release 24 hr  take 1 tablet (50 mg) by oral route once daily   venlafaxine 150 mg oral tablet extended release 24hr 06/19/2020 take 1 tablet (150 mg) by oral route once daily in the morning at the same time each day with food for 90 days         Allergy List  Allergen Name Date Reaction Notes   Latex --  --  --    NO KNOWN DRUG ALLERGIES --  --  --        Allergies Reconciled  Social History  Finding Status Start/Stop Quantity Notes   Alcohol Never --/-- --  --    Disabled --  --/-- --  --    lives with spouse --  --/-- --  --    Retired --  --/-- --  --    Substance Abuse Never --/-- --  --    Tobacco Never --/-- --  --          Review of Systems  · Constitutional  o Admits  o : fatigue  o Denies  o : fever, weight loss, weight gain  · HENT  o Denies  o : nasal congestion, postnasal drip  · Cardiovascular  o Denies  o : lower extremity edema, claudication, chest pressure, palpitations  · Respiratory  o Denies  o : shortness of breath, wheezing, cough, hemoptysis, dyspnea on exertion  · Gastrointestinal  o Denies  o : nausea, vomiting, diarrhea, constipation, abdominal pain  · Integument  o Denies  o : rash,  "itching  · Psychiatric  o Denies  o : anxiety, depression      Vitals  Date Time BP Position Site L\R Cuff Size HR RR TEMP (F) WT  HT  BMI kg/m2 BSA m2 O2 Sat HC       09/11/2020 10:24 /65 Sitting    71 - R 16 97.4 146lbs 0oz 5'  4\" 25.06 1.73 97 %          Physical Examination  · Constitutional  o Appearance  o : no acute distress, well-nourished  · Head and Face  o Head  o :   § Inspection  § : atraumatic, normocephalic  o Face  o :   § Inspection  § : no facial lesions  · Ears, Nose, Mouth and Throat  o Ears  o :   § External Ears  § : normal  o Nose  o :   § Intranasal Exam  § : nares patent  o Oral Cavity  o :   § Oral Mucosa  § : moist mucous membranes  · Neck  o Thyroid  o : gland size normal, nontender, no nodules or masses present on palpation, symmetric  · Respiratory  o Respiratory Effort  o : breathing comfortably, symmetric chest rise  o Auscultation of Lungs  o : clear to asculatation bilaterally, no wheezes, rales, or rhonchii  · Cardiovascular  o Heart  o :   § Auscultation of Heart  § : regular rate and rhythm, no murmurs, rubs, or gallops  o Peripheral Vascular System  o :   § Extremities  § : no edema  · Lymphatic  o Axilla  o : no lymphadenopathy present  · Skin and Subcutaneous Tissue  o General Inspection  o : no lesions present, no areas of discoloration, skin turgor normal  · Neurologic  o Mental Status Examination  o :   § Orientation  § : grossly oriented to person, place and time  o Gait and Station  o :   § Gait Screening  § : normal gait  · Psychiatric  o General  o : normal mood and affect          Assessment  · Fatigue     780.79/R53.83  · Depression     296.31  · GERD (gastroesophageal reflux disease) without esophagitis     530.81/K21.9  · HTN (hypertension)     401.9/I10  · Insomnia     780.52/G47.00  · Neuropathy     729.2  · T2DM (type 2 diabetes mellitus)     250.00/E11.9            Problems Reconciled  Plan  · Orders  o Iron panel (iron, TIBC, transferrin saturation) " (37861, 21808, 22886) - 780.79/R53.83 - 09/11/2020  o B12 Folate levels (B12FO) - 780.79/R53.83 - 09/11/2020  o Free T4 (65627) - 250.00/E11.9, 401.9/I10, 780.79/R53.83 - 09/11/2020  o Hgb A1c [facility] (09527) - 250.00/E11.9 - 09/11/2020  o Physical, Primary Care Panel (CBC, CMP, Lipid, TSH) [facility] (98248, 31648, 41056, 91753) - 530.81/K21.9, 780.52/G47.00, 729.2, 780.79/R53.83 - 09/11/2020  o Vitamin D (25-Hydroxy) Level (20528) - 250.00/E11.9, 780.79/R53.83 - 09/11/2020  o IM/SQ - Injection Fee [facility] (04235) - - 09/11/2020  o ACO-39: Current medications updated and reviewed () - - 09/11/2020  o Vitamin B12 Injection () - 780.79/R53.83 - 09/11/2020   Injection - Vitamin B12; Dose: 1mL; Site: Left Deltoid; Route: intramuscular; Date: 09/11/2020 13:27:54; Exp: 03/22/2022; Lot: [id number]; Mfg: N/A; TradeName: cyanocobalamin (vitamin B-12); Location: [facility]; Administered By: Letitia Guillaume; Comment: Pt tolerated well.  · Medications  o Medications have been Reconciled  o Transition of Care or Provider Policy  · Instructions  o Patient was educated/instructed on their diagnosis, treatment and medications prior to discharge from the clinic today.  o Risks, benefits, and alternatives were discussed with the patient. The patient is aware of risks associated with:  o Chronic conditions reviewed and taken into consideration for today's treatment plan.  o Electronically Identified Patient Education Materials Provided Electronically  · Disposition  o Call or Return if symptoms worsen or persist.  o annual wellness at follow up  o Return Visit Request in/on 1 month +/- 2 days (15656).            Electronically Signed by: Paul Choi, DO -Author on September 15, 2020 03:59:45 PM

## 2021-05-14 VITALS
HEIGHT: 64 IN | SYSTOLIC BLOOD PRESSURE: 122 MMHG | TEMPERATURE: 97.4 F | OXYGEN SATURATION: 97 % | BODY MASS INDEX: 24.92 KG/M2 | HEART RATE: 71 BPM | DIASTOLIC BLOOD PRESSURE: 65 MMHG | WEIGHT: 146 LBS | RESPIRATION RATE: 16 BRPM

## 2021-05-14 NOTE — PROGRESS NOTES
Progress Note      Patient Name: Consuelo Carlson   Patient ID: [id number]   Sex: Female   YOB: 1960    Primary Care Provider: Paul Choi DO   Referring Provider: Paul Choi DO    Visit Date: December 18, 2020    Provider: Paul Choi DO   Location: [facility]   Location Address: [address] [place] [address]  [id number]   Location Phone: [phone]          Chief Complaint  · f/u on chronic conditions      History Of Present Illness  TELEHEALTH TELEPHONE VISIT  Consuelo Carlson is a 60 year old /White female who is presenting for evaluation via telehealth telephone visit. Verbal consent obtained before beginning visit.   Provider spent 7 minutes with patient during telehealth visit.   The following staff were present during this visit: Paul Choi DO   Past Medical History/Overview of Patient Symptoms  Consuelo Carlson is a 60 year old /White female who presents for evaluation and treatment of:        Patient calls follow-up on chronic conditions and recent labs which were good A1c 7.2 LDL was 33 on metformin Lipitor doing quite well no chest palpitation fever       Past Medical History  Disease Name Date Onset Notes   Depression --  --    GERD (gastroesophageal reflux disease) without esophagitis --  --    HTN (hypertension) --  --    Insomnia --  --    Long term use of drug --  --    Neuropathy --  --    Pap Smear for Vaginal Cancer Screening 2019 --    Screening for breast cancer 2019 --    T2DM (type 2 diabetes mellitus) --  --          Past Surgical History  Procedure Name Date Notes   Back surgery --  --    breast reduction --  --    C section --  --    Hysterectomy --  --    Knee surgery --  --    Neck Surgery --  --    Tubal ligation --  --          Medication List  Name Date Started Instructions   Esgic -40 mg oral tablet  take 1 tablet by oral route every 4 hours as needed not to exceed 6 tablets per 24hrs   gabapentin  600 mg oral tablet  take 1 tablet (600 mg) by oral route 3 times per day   hydrochlorothiazide 12.5 mg oral tablet  take 1 tablet (12.5 mg) by oral route once daily   Lipitor 40 mg oral tablet 09/15/2020 take 1 tablet (40 mg) by oral route once daily at bedtime for 30 days   losartan 25 mg oral tablet 06/19/2020 take 1 tablet (25 mg) by oral route once daily for 90 days   metformin 1,000 mg oral tablet extended release 24hr 06/19/2020 take 1 tablet (1,000 mg) by oral route 2 times per day with meals for 90 days   pantoprazole 40 mg oral tablet,delayed release (DR/EC)  take 1 tablet (40 mg) by oral route once daily   temazepam 30 mg oral capsule 06/19/2020 take 1 capsule (30 mg) by oral route once daily at bedtime as needed for 30 days   Toprol XL 50 mg oral tablet extended release 24 hr  take 1 tablet (50 mg) by oral route once daily   venlafaxine 150 mg oral tablet extended release 24hr 06/19/2020 take 1 tablet (150 mg) by oral route once daily in the morning at the same time each day with food for 90 days         Allergy List  Allergen Name Date Reaction Notes   Latex --  --  --    NO KNOWN DRUG ALLERGIES --  --  --          Social History  Finding Status Start/Stop Quantity Notes   Alcohol Never --/-- --  --    Disabled --  --/-- --  --    lives with spouse --  --/-- --  --    Retired --  --/-- --  --    Substance Abuse Never --/-- --  --    Tobacco Never --/-- --  --          Immunizations  NameDate Admin Mfg Trade Name Lot Number Route Inj VIS Given VIS Publication   [id number]/26/2020 [facility] Fluzone Quadrivalent [id number] IM LD 10/26/2020 08/15/2019   Comments: NDC#[id number], PATIENT TOLERATED WELL, NO REACTION.         Review of Systems  · Constitutional  o Denies  o : fever, fatigue, weight loss, weight gain  · HENT  o Denies  o : nasal congestion, postnasal drip  · Cardiovascular  o Denies  o : lower extremity edema, claudication, chest pressure, palpitations  · Respiratory  o Denies  o : shortness of breath,  wheezing, cough, hemoptysis, dyspnea on exertion  · Gastrointestinal  o Denies  o : nausea, vomiting, diarrhea, constipation, abdominal pain  · Integument  o Denies  o : rash, itching, pigmentation changes  · Psychiatric  o Denies  o : anxiety, depression          Assessment  · Hyperlipidemia     272.4/E78.5  · Depression     296.31  · HTN (hypertension)     401.9/I10  · Insomnia     780.52/G47.00  · T2DM (type 2 diabetes mellitus)     250.00/E11.9         Insomnia  *controlled  continue restoril  contract, CATALINA review     T2DM  *controlled  continue metformin, a1c 7.2, repeat 6 months  monitor glucose at home, f/u if no improvement or concerns  goal a1c 7-8    HTN  *controlled  continue BP medicine  goal <140/90    HLD   *controlled  continue Lipitor recheck 6 months    f/u 6 months for chronic conditions     Problems Reconciled  Plan  · Orders  o CATALINA Report (KASPR) - 780.52/G47.00, 250.00/E11.9 - 12/18/2020  o ACO-39: Current medications updated and reviewed (1159F, ) - 296.31, 780.52/G47.00 - 12/18/2020  o Physican Telephone evaluation, 5-10 min (83788) - 401.9/I10, 780.52/G47.00 - 12/18/2020  · Instructions  o Advised that cheeses and other sources of dairy fats, animal fats, fast food, and the extras (candy, pastries, pies, doughnuts and cookies) all contain LDL raising nutrients. Advised to increase fruits, vegetables, whole grains, and to monitor portion sizes.   o Plan Of Care:   o Trusted Web sites were provided.  o Bring all medicines with their bottles to each office visit.  · Disposition  o Call or Return if symptoms worsen or persist.  o Return Visit Request in/on 6 months +/- 2 days (58239).            Electronically Signed by: Paul Cohi DO -Author on December 18, 2020 01:06:56 PM

## 2021-05-15 VITALS
TEMPERATURE: 95 F | HEIGHT: 64 IN | DIASTOLIC BLOOD PRESSURE: 74 MMHG | SYSTOLIC BLOOD PRESSURE: 117 MMHG | BODY MASS INDEX: 25.82 KG/M2 | OXYGEN SATURATION: 98 % | WEIGHT: 151.25 LBS | RESPIRATION RATE: 14 BRPM | HEART RATE: 76 BPM

## 2021-05-24 ENCOUNTER — OFFICE VISIT CONVERTED (OUTPATIENT)
Dept: FAMILY MEDICINE CLINIC | Facility: CLINIC | Age: 61
End: 2021-05-24
Attending: FAMILY MEDICINE

## 2021-05-24 ENCOUNTER — HOSPITAL ENCOUNTER (OUTPATIENT)
Dept: GENERAL RADIOLOGY | Facility: HOSPITAL | Age: 61
Discharge: HOME | End: 2021-05-24
Attending: FAMILY MEDICINE

## 2021-05-24 ENCOUNTER — TELEPHONE (OUTPATIENT)
Dept: OBSTETRICS AND GYNECOLOGY | Facility: CLINIC | Age: 61
End: 2021-05-24

## 2021-05-24 LAB
ALBUMIN SERPL-MCNC: 4.2 G/DL (ref 3.5–5)
ALBUMIN/GLOB SERPL: 1.7 {RATIO} (ref 1.4–2.6)
ALP SERPL-CCNC: 93 U/L (ref 43–160)
ALT SERPL-CCNC: 18 U/L (ref 10–40)
ANION GAP SERPL CALC-SCNC: 15 MMOL/L (ref 8–19)
AST SERPL-CCNC: 17 U/L (ref 15–50)
BILIRUB SERPL-MCNC: 0.39 MG/DL (ref 0.2–1.3)
BUN SERPL-MCNC: 15 MG/DL (ref 5–25)
BUN/CREAT SERPL: 19 {RATIO} (ref 6–20)
CALCIUM SERPL-MCNC: 9.1 MG/DL (ref 8.7–10.4)
CHLORIDE SERPL-SCNC: 103 MMOL/L (ref 99–111)
CHOLEST SERPL-MCNC: 111 MG/DL (ref 107–200)
CHOLEST/HDLC SERPL: 2.7 {RATIO} (ref 3–6)
CONV CO2: 28 MMOL/L (ref 22–32)
CONV TOTAL PROTEIN: 6.7 G/DL (ref 6.3–8.2)
CREAT UR-MCNC: 0.77 MG/DL (ref 0.5–0.9)
EST. AVERAGE GLUCOSE BLD GHB EST-MCNC: 148 MG/DL
GFR SERPLBLD BASED ON 1.73 SQ M-ARVRAT: >60 ML/MIN/{1.73_M2}
GLOBULIN UR ELPH-MCNC: 2.5 G/DL (ref 2–3.5)
GLUCOSE SERPL-MCNC: 155 MG/DL (ref 65–99)
HBA1C MFR BLD: 6.8 % (ref 3.5–5.7)
HDLC SERPL-MCNC: 41 MG/DL (ref 40–60)
LDLC SERPL CALC-MCNC: 47 MG/DL (ref 70–100)
OSMOLALITY SERPL CALC.SUM OF ELEC: 298 MOSM/KG (ref 273–304)
POTASSIUM SERPL-SCNC: 4.3 MMOL/L (ref 3.5–5.3)
SODIUM SERPL-SCNC: 142 MMOL/L (ref 135–147)
TRIGL SERPL-MCNC: 115 MG/DL (ref 40–150)
VLDLC SERPL-MCNC: 23 MG/DL (ref 5–37)

## 2021-05-24 RX ORDER — CLOBETASOL PROPIONATE 0.5 MG/G
CREAM TOPICAL EVERY OTHER DAY
Qty: 30 G | Refills: 2 | Status: SHIPPED | OUTPATIENT
Start: 2021-05-24 | End: 2022-05-05 | Stop reason: SDUPTHER

## 2021-05-24 NOTE — TELEPHONE ENCOUNTER
Patient would like a refill of clobetasol cream sent to [facility] in [place], [place]. Pt ph [phone]

## 2021-06-06 NOTE — PROGRESS NOTES
Progress Note      Patient Name: Consuelo Carlson   Patient ID: [id number]   Sex: Female   YOB: 1960    Primary Care Provider: Paul Choi DO   Referring Provider: Paul Choi DO    Visit Date: May 24, 2021    Provider: Paul Choi DO   Location: [facility]   Location Address: [address] [place] [address]  [id number]   Location Phone: [phone]          Chief Complaint  · Follow up, medication refills      History Of Present Illness  Consuelo Carlson is a 61 year old /White female who presents for evaluation and treatment of:      Patient last seen 12/2020 history of HLD depression HTN on temazepam for sleep anxiety additionally takes losartan for blood pressure Lipitor for cholesterol Metformin for history of diabetes    Last A1c was 12/2020 was 7.2 fairly well controlled, LDL was 33 total cholesterol 105    She is due for annual on his visit sometime this year colonoscopy mammogram additionally if not previously executed she recently establish care in June 2020 and was reported to have had a Pap smear mammogram 2019 no colonoscopy or unknown per her chart         Past Medical History  Disease Name Date Onset Notes   Depression --  --    GERD (gastroesophageal reflux disease) without esophagitis --  --    HTN (hypertension) --  --    Insomnia --  --    Long term use of drug --  --    Neuropathy --  --    Pap Smear for Vaginal Cancer Screening 2019 --    Screening for breast cancer 2019 --    T2DM (type 2 diabetes mellitus) --  --          Past Surgical History  Procedure Name Date Notes   Back surgery --  --    breast reduction --  --    C section --  --    Hysterectomy --  --    Knee surgery --  --    Neck Surgery --  --    Tubal ligation --  --          Medication List  Name Date Started Instructions   atorvastatin 40 mg oral tablet 05/24/2021 TAKE 1 TABLET BY MOUTH AT BEDTIME (CHOLESTEROL)   Esgic -40 mg oral tablet  take 1 tablet by oral  route every 4 hours as needed not to exceed 6 tablets per 24hrs   gabapentin 600 mg oral tablet  take 1 tablet (600 mg) by oral route 3 times per day   losartan 25 mg oral tablet 05/24/2021 take 1 tablet (25 mg) by oral route once daily for 30 days   metformin 1,000 mg oral tablet extended release 24hr 06/19/2020 take 1 tablet (1,000 mg) by oral route 2 times per day with meals for 90 days   pantoprazole 40 mg oral tablet,delayed release (DR/EC)  take 1 tablet (40 mg) by oral route once daily   temazepam 30 mg oral capsule 05/24/2021 take 1 capsule (30 mg) by oral route once daily at bedtime as needed for 30 days   Toprol XL 50 mg oral tablet extended release 24 hr 05/24/2021 take 1 tablet (50 mg) by oral route once daily for 30 days   venlafaxine 150 mg oral tablet extended release 24hr 05/24/2021 take 1 tablet (150 mg) by oral route once daily in the morning at the same time each day with food for 30 days         Allergy List  Allergen Name Date Reaction Notes   Latex --  --  --    NO KNOWN DRUG ALLERGIES --  --  --          Social History  Finding Status Start/Stop Quantity Notes   Alcohol Never --/-- --  --    Disabled --  --/-- --  --    lives with spouse --  --/-- --  --    Retired --  --/-- --  --    Substance Abuse Never --/-- --  --    Tobacco Never --/-- --  --          Immunizations  NameDate Admin Mfg Trade Name Lot Number Route Inj VIS Given VIS Publication   Jpfdocadv50/26/2020 [facility] Fluzone Quadrivalent [id number] IM LD 10/26/2020 08/15/2019   Comments: NDC#[id number], PATIENT TOLERATED WELL, NO REACTION.         Review of Systems  · Constitutional  o * See HPI  · Eyes  o * See HPI  · HENT  o * See HPI  · Breasts  o * See HPI  · Cardiovascular  o * See HPI  · Respiratory  o * See HPI  · Gastrointestinal  o * See HPI  · Genitourinary  o * See HPI  · Integument  o * See HPI  · Neurologic  o * See HPI  · Musculoskeletal  o * See HPI  · Endocrine  o * See HPI  · Psychiatric  o * See HPI  · Heme-Lymph  o * See  "HPI  · Allergic-Immunologic  o * See HPI      Vitals  Date Time BP Position Site L\R Cuff Size HR RR TEMP (F) WT  HT  BMI kg/m2 BSA m2 O2 Sat FR L/min FiO2 HC       05/24/2021 09:27 /71 Sitting    77 - R 20 97.4 141lbs 8oz 5'  4\" 24.29 1.7 98 %  21%          Physical Examination  · Constitutional  o Appearance  o : no acute distress, well-nourished  · Head and Face  o Head  o :   § Inspection  § : atraumatic, normocephalic  · Ears, Nose, Mouth and Throat  o Ears  o :   § External Ears  § : normal  o Nose  o :   § Intranasal Exam  § : nares patent  o Oral Cavity  o :   § Oral Mucosa  § : moist mucous membranes  · Respiratory  o Respiratory Effort  o : breathing comfortably, symmetric chest rise  o Auscultation of Lungs  o : clear to asculatation bilaterally, no wheezes, rales, or rhonchii  · Cardiovascular  o Heart  o :   § Auscultation of Heart  § : regular rate and rhythm, no murmurs, rubs, or gallops  o Peripheral Vascular System  o :   § Extremities  § : no edema  · Lymphatic  o Neck  o : no lymphadenopathy present  · Neurologic  o Mental Status Examination  o :   § Orientation  § : grossly oriented to person, place and time  o Gait and Station  o :   § Gait Screening  § : normal gait  · Psychiatric  o General  o : normal mood and affect              Assessment  · Depression     296.31  · GERD (gastroesophageal reflux disease) without esophagitis     530.81/K21.9  · HTN (hypertension)     401.9/I10  · Insomnia     780.52/G47.00  · Neuropathy     729.2  · T2DM (type 2 diabetes mellitus)     250.00/E11.9         T2DM  HTN  HLD  *Controlled  Continue with monitoring blood sugars at home and medications as prescribed  We will recheck her labs today recommend an eye and foot exam annually and mammogram colonoscopy if not previously done    Leg cramps  We will advise her to take B12 supplements to help with no improvement follow-up and will further evaluate B12 injection today    Follow-up 3 6 months chronic " conditions annual on his visit at next appointment       Plan  · Orders  o CATALINA Report (KASPR) - 401.9/I10, 780.52/G47.00, 729.2 - 05/24/2021  o ACO-14: Influenza immunization administered or previously received [facility] () - - 05/24/2021  o ACO-39: Current medications updated and reviewed (, 1159F) - 401.9/I10, 780.52/G47.00, 729.2, 250.00/E11.9 - 05/24/2021  o Diabetes 1 Panel (CMP, Lipid, A1c) [facility] (45098, 21232, 03397) - 401.9/I10, 780.52/G47.00, 729.2, 250.00/E11.9 - 05/24/2021  · Medications  o temazepam 30 mg oral capsule   SIG: take 1 capsule (30 mg) by oral route once daily at bedtime as needed for 30 days   DISP: (30) Capsule with 5 refills  Refilled on 05/24/2021     o Medications have been Reconciled  o Transition of Care or Provider Policy  · Instructions  o Handouts were given to patient:   o Patient is taking medications as prescribed and doing well.   o Take all medications as prescribed/directed.  o Patient was educated/instructed on their diagnosis, treatment and medications prior to discharge from the clinic today.  o Patient instructed to seek medical attention urgently for new or worsening symptoms.  o Trusted Web sites were provided.  o Call the office with any concerns or questions.  o Bring all medicines with their bottles to each office visit.  o Risks, benefits, and alternatives were discussed with the patient. The patient is aware of risks associated with:  o Chronic conditions reviewed and taken into consideration for today's treatment plan.  o Electronically Identified Patient Education Materials Provided Electronically  · Disposition  o Call or Return if symptoms worsen or persist.  o Labs before follow up ordered  o Reviewed chart labs and imaging prior to and during encounter, updated  o Return Visit Request in/on 6 months +/- 7 days ([id number]).            Electronically Signed by: Paul Choi DO -Author on May 24, 2021 09:40:59 AM

## 2021-06-10 ENCOUNTER — TELEPHONE (OUTPATIENT)
Dept: OBSTETRICS AND GYNECOLOGY | Facility: CLINIC | Age: 61
End: 2021-06-10

## 2021-06-10 NOTE — TELEPHONE ENCOUNTER
Good afternoon,  Patient is calling to get a refill of RX-valACYclovir (VALTREX) 500 MG tablet     If possible.      Please advise,  Thank you        Pharmacy confirmed -[facility] - [place], [place] - [address] [place] [address] - [phone]  - [phone] FX

## 2021-06-11 RX ORDER — VALACYCLOVIR HYDROCHLORIDE 1 G/1
1000 TABLET, FILM COATED ORAL 2 TIMES DAILY
Qty: 30 TABLET | Refills: 0 | Status: SHIPPED | OUTPATIENT
Start: 2021-06-11 | End: 2021-06-16

## 2021-07-15 VITALS
HEIGHT: 64 IN | HEART RATE: 77 BPM | RESPIRATION RATE: 20 BRPM | OXYGEN SATURATION: 98 % | TEMPERATURE: 97.4 F | DIASTOLIC BLOOD PRESSURE: 71 MMHG | SYSTOLIC BLOOD PRESSURE: 119 MMHG | WEIGHT: 141.5 LBS | BODY MASS INDEX: 24.16 KG/M2

## 2021-07-20 RX ORDER — METFORMIN HYDROCHLORIDE 500 MG/1
TABLET, EXTENDED RELEASE ORAL
Qty: 360 TABLET | Refills: 2 | Status: SHIPPED | OUTPATIENT
Start: 2021-07-20 | End: 2022-05-02

## 2021-09-13 RX ORDER — METOPROLOL SUCCINATE 50 MG/1
TABLET, EXTENDED RELEASE ORAL
Qty: 30 TABLET | Refills: 1 | Status: SHIPPED | OUTPATIENT
Start: 2021-09-13 | End: 2021-11-23 | Stop reason: SDUPTHER

## 2021-11-23 ENCOUNTER — OFFICE VISIT (OUTPATIENT)
Dept: FAMILY MEDICINE CLINIC | Facility: CLINIC | Age: 61
End: 2021-11-23

## 2021-11-23 VITALS
TEMPERATURE: 97.2 F | HEIGHT: 64 IN | DIASTOLIC BLOOD PRESSURE: 91 MMHG | HEART RATE: 107 BPM | OXYGEN SATURATION: 97 % | WEIGHT: 143.8 LBS | SYSTOLIC BLOOD PRESSURE: 138 MMHG | BODY MASS INDEX: 24.55 KG/M2

## 2021-11-23 DIAGNOSIS — M51.36 LUMBAR DEGENERATIVE DISC DISEASE: Chronic | ICD-10-CM

## 2021-11-23 DIAGNOSIS — F32.5 MAJOR DEPRESSIVE DISORDER IN FULL REMISSION, UNSPECIFIED WHETHER RECURRENT (HCC): Chronic | ICD-10-CM

## 2021-11-23 DIAGNOSIS — E78.2 MIXED HYPERLIPIDEMIA: Chronic | ICD-10-CM

## 2021-11-23 DIAGNOSIS — E11.65 TYPE 2 DIABETES MELLITUS WITH HYPERGLYCEMIA, WITHOUT LONG-TERM CURRENT USE OF INSULIN (HCC): Primary | Chronic | ICD-10-CM

## 2021-11-23 DIAGNOSIS — I10 PRIMARY HYPERTENSION: Chronic | ICD-10-CM

## 2021-11-23 DIAGNOSIS — F51.01 PRIMARY INSOMNIA: Chronic | ICD-10-CM

## 2021-11-23 DIAGNOSIS — K21.9 GASTROESOPHAGEAL REFLUX DISEASE WITHOUT ESOPHAGITIS: Chronic | ICD-10-CM

## 2021-11-23 DIAGNOSIS — G62.9 NEUROPATHY: Chronic | ICD-10-CM

## 2021-11-23 PROCEDURE — 99214 OFFICE O/P EST MOD 30 MIN: CPT | Performed by: FAMILY MEDICINE

## 2021-11-23 RX ORDER — VENLAFAXINE HYDROCHLORIDE 150 MG/1
150 CAPSULE, EXTENDED RELEASE ORAL DAILY
Qty: 90 CAPSULE | Refills: 3 | Status: SHIPPED | OUTPATIENT
Start: 2021-11-23 | End: 2022-09-06 | Stop reason: SDUPTHER

## 2021-11-23 RX ORDER — TEMAZEPAM 30 MG/1
30 CAPSULE ORAL NIGHTLY PRN
Qty: 90 CAPSULE | Refills: 1 | Status: SHIPPED | OUTPATIENT
Start: 2021-11-23 | End: 2021-12-13

## 2021-11-23 RX ORDER — METOPROLOL SUCCINATE 50 MG/1
50 TABLET, EXTENDED RELEASE ORAL DAILY
Qty: 90 TABLET | Refills: 3 | Status: SHIPPED | OUTPATIENT
Start: 2021-11-23 | End: 2022-09-06 | Stop reason: SDUPTHER

## 2021-11-23 RX ORDER — LOSARTAN POTASSIUM 25 MG/1
25 TABLET ORAL DAILY
Qty: 90 TABLET | Refills: 3 | Status: SHIPPED | OUTPATIENT
Start: 2021-11-23 | End: 2022-09-06 | Stop reason: SDUPTHER

## 2021-11-23 RX ORDER — GABAPENTIN 600 MG/1
TABLET ORAL
COMMUNITY
Start: 2021-10-05

## 2021-11-23 RX ORDER — ATORVASTATIN CALCIUM 40 MG/1
40 TABLET, FILM COATED ORAL NIGHTLY
Qty: 90 TABLET | Refills: 3 | Status: SHIPPED | OUTPATIENT
Start: 2021-11-23 | End: 2022-09-06 | Stop reason: SDUPTHER

## 2021-11-23 NOTE — PROGRESS NOTES
"Chief Complaint  Follow-up (6 month follow up ) and Med Refill (pt prefers written perscription if possible)    Subjective          Consuelo Carlson presents to [facility]  History of Present Illness    Presents for follow up on chronic conditions, has history of type 2 diabetes, well controlled, on metformin 2000mg total daily, last a1c was <7. Also on lipitor 40mg LDL at goal <, blood pressure well controlled at home borderline today on losartan and appropriate medicines.    She takes gabapentin for neuropathy, CATALINA reviewed controlled, appropraite with refills, temazepam helps with insomnia and effexor for history of depression anxiety, also help to sleep at times. No si/hi or other, up date on most things and no concerns overall today just needs refills if able.     Maybe due for AWV at next appt, no previous AWV documented and has been under my care since 6/2020.    Objective   Vital Signs:   /91   Pulse 107   Temp 97.2 °F (36.2 °C) (Temporal)   Ht 162.6 cm (64\")   Wt 65.2 kg (143 lb 12.8 oz)   SpO2 97%   BMI 24.68 kg/m²     Physical Exam  Vitals reviewed.   Constitutional:       Appearance: Normal appearance. She is well-developed.   HENT:      Head: Normocephalic and atraumatic.      Right Ear: External ear normal.      Left Ear: External ear normal.      Mouth/Throat:      Pharynx: No oropharyngeal exudate.   Eyes:      Conjunctiva/sclera: Conjunctivae normal.      Pupils: Pupils are equal, round, and reactive to light.   Cardiovascular:      Rate and Rhythm: Normal rate.   Pulmonary:      Effort: Pulmonary effort is normal.   Abdominal:      General: Abdomen is flat. There is no distension.      Palpations: Abdomen is soft.   Skin:     General: Skin is warm and dry.   Neurological:      General: No focal deficit present.      Mental Status: She is alert and oriented to person, place, and time.   Psychiatric:         Mood and Affect: Mood and affect " normal.         Behavior: Behavior normal.         Thought Content: Thought content normal.         Judgment: Judgment normal.        Result Review :   The following data was reviewed by: Paul Choi DO on 11/23/2021:  CMP    CMP 12/11/20 5/24/21   Glucose 105 (A) 155 (A)   BUN 13 15   Creatinine 0.70 0.77   Sodium 141 142   Potassium 4.0 4.3   Chloride 101 103   Calcium 9.5 9.1   Albumin 4.3 4.2   Total Bilirubin 0.67 0.39   Alkaline Phosphatase 104 93   AST (SGOT) 18 17   ALT (SGPT) 20 18   (A) Abnormal value                Lipid Panel    Lipid Panel 12/11/20 5/24/21   Total Cholesterol 105 (A) 111   Triglycerides 155 (A) 115   HDL Cholesterol 41 41   VLDL Cholesterol 31 23   LDL Cholesterol  33 (A) 47 (A)   (A) Abnormal value       Comments are available for some flowsheets but are not being displayed.               A1C Last 3 Results    HGBA1C Last 3 Results 12/11/20 5/24/21   Hemoglobin A1C 7.2 (A) 6.8 (A)   (A) Abnormal value       Comments are available for some flowsheets but are not being displayed.                         Assessment and Plan    Diagnoses and all orders for this visit:    1. Type 2 diabetes mellitus with hyperglycemia, without long-term current use of insulin (HCC) (Primary)  Assessment & Plan:  Lab Results   Component Value Date    HGBA1C 6.8 (H) 05/24/2021     Repeat every 3-6 months, last a1c as above controlled at <7.0  Continue metformin 500mg 2 tab bid, 2000mg total  Monitor glucose at home as appropriate  Recheck feet and eyes annually  Update vax as appropriate  F/u 6 months, labs to evaluate a1c today or before follow up and lipid, urine micro, etc  AWV at next appt if appropriate      Orders:  -     Microalbumin / Creatinine Urine Ratio - Urine, Clean Catch  -     Comprehensive Metabolic Panel; Future  -     Lipid Panel; Future  -     Hemoglobin A1c; Future  -     Vitamin B12 & Folate; Future  -     losartan (COZAAR) 25 MG tablet; Take 1 tablet by mouth Daily.  Dispense: 90  tablet; Refill: 3    2. Primary insomnia  Assessment & Plan:  *controlled  CATALINA reviewed  Can continue temazepam as needed  Counseled on chronic use, long term affects, dementia and other      Orders:  -     venlafaxine XR (EFFEXOR-XR) 150 MG 24 hr capsule; Take 1 capsule by mouth Daily.  Dispense: 90 capsule; Refill: 3  -     temazepam (RESTORIL) 30 MG capsule; Take 1 capsule by mouth At Night As Needed for Sleep for up to 180 days.  Dispense: 90 capsule; Refill: 1    3. Primary hypertension  Assessment & Plan:  Controlled*  Continue medicines, treatment plan  At goal <140/90  Monitor at home, labs every 6-12 months as indicated  Fu for symptoms of headache or acute BP changes        Orders:  -     metoprolol succinate XL (TOPROL-XL) 50 MG 24 hr tablet; Take 1 tablet by mouth Daily. for blood pressure  Dispense: 90 tablet; Refill: 3  -     atorvastatin (LIPITOR) 40 MG tablet; Take 1 tablet by mouth Every Night.  Dispense: 90 tablet; Refill: 3    4. Mixed hyperlipidemia  Assessment & Plan:  *controlled  Continue lipitor 40mg daily  recheck lipids annually, will order today to be done prior to fu and next AWV  Goal LDL <  Lab Results   Component Value Date    CHLPL 111 05/24/2021    TRIG 115 05/24/2021    HDL 41 05/24/2021    LDL 47 (L) 05/24/2021             5. Major depressive disorder in full remission, unspecified whether recurrent (HCC)  Assessment & Plan:  *controlled, managed  Continue effexor 150mg   Additionally taking temazepam 30mg at night for sleep   No SI/HI, counseled on managing condition and follow up if any mood changes, more bad days than good days or other    Orders:  -     venlafaxine XR (EFFEXOR-XR) 150 MG 24 hr capsule; Take 1 capsule by mouth Daily.  Dispense: 90 capsule; Refill: 3    6. Lumbar degenerative disc disease  Assessment & Plan:  *chronic, managed, controlled  CATALINA reviewed  Continue prn NSAIDs, gabapentin prescribed by other provider  Again counseled on continued use of  controlled medication, long term affects, addiction and alternatives to treatment   Follow up with other provider for refills, pain management if appropriate  per CATALINA 11/4/2021 600mg TID       7. Gastroesophageal reflux disease without esophagitis  Assessment & Plan:  *controlled  Diet managed, avoid triggers  Consider PPI to better manage if more frequent flare ups or symptoms      8. Neuropathy  Assessment & Plan:  *managed per pain management or other provider, gabapentin 600mg TID last filled 11/4/2021 per CATALINA        Follow Up   Return in about 6 months (around 5/23/2022), or if symptoms worsen or fail to improve, for Labs before, Medicare Wellness.  Patient was given instructions and counseling regarding her condition or for health maintenance advice. Please see specific information pulled into the AVS if appropriate.

## 2021-11-27 PROBLEM — G47.00 INSOMNIA: Status: ACTIVE | Noted: 2021-11-27

## 2021-11-27 PROBLEM — Z79.899 LONG TERM USE OF DRUG: Status: ACTIVE | Noted: 2021-11-27

## 2021-11-27 PROBLEM — G62.9 NEUROPATHY: Status: ACTIVE | Noted: 2021-11-27

## 2021-11-27 PROBLEM — G47.00 INSOMNIA: Chronic | Status: ACTIVE | Noted: 2021-11-27

## 2021-11-27 PROBLEM — F32.A DEPRESSION: Status: ACTIVE | Noted: 2021-11-27

## 2021-11-27 PROBLEM — E11.9 T2DM (TYPE 2 DIABETES MELLITUS) (HCC): Chronic | Status: ACTIVE | Noted: 2021-11-27

## 2021-11-27 PROBLEM — E11.9 T2DM (TYPE 2 DIABETES MELLITUS): Status: ACTIVE | Noted: 2021-11-27

## 2021-11-27 PROBLEM — E78.2 MIXED HYPERLIPIDEMIA: Chronic | Status: ACTIVE | Noted: 2021-11-27

## 2021-11-27 PROBLEM — F32.A DEPRESSION: Chronic | Status: ACTIVE | Noted: 2021-11-27

## 2021-11-27 NOTE — ASSESSMENT & PLAN NOTE
*controlled, managed  Continue effexor 150mg   Additionally taking temazepam 30mg at night for sleep   No SI/HI, counseled on managing condition and follow up if any mood changes, more bad days than good days or other

## 2021-11-27 NOTE — ASSESSMENT & PLAN NOTE
*managed per pain management or other provider, gabapentin 600mg TID last filled 11/4/2021 per CATALINA

## 2021-11-27 NOTE — ASSESSMENT & PLAN NOTE
Lab Results   Component Value Date    HGBA1C 6.8 (H) 05/24/2021     Repeat every 3-6 months, last a1c as above controlled at <7.0  Continue metformin 500mg 2 tab bid, 2000mg total  Monitor glucose at home as appropriate  Recheck feet and eyes annually  Update vax as appropriate  F/u 6 months, labs to evaluate a1c today or before follow up and lipid, urine micro, etc  AWV at next appt if appropriate

## 2021-11-27 NOTE — ASSESSMENT & PLAN NOTE
*controlled  Diet managed, avoid triggers  Consider PPI to better manage if more frequent flare ups or symptoms

## 2021-11-27 NOTE — ASSESSMENT & PLAN NOTE
*chronic, managed, controlled  CATALINA reviewed  Continue prn NSAIDs, gabapentin prescribed by other provider  Again counseled on continued use of controlled medication, long term affects, addiction and alternatives to treatment   Follow up with other provider for refills, pain management if appropriate  per CATALINA 11/4/2021 600mg TID

## 2021-11-27 NOTE — ASSESSMENT & PLAN NOTE
*controlled  CATALINA reviewed  Can continue temazepam as needed  Counseled on chronic use, long term affects, dementia and other

## 2021-11-27 NOTE — ASSESSMENT & PLAN NOTE
*controlled  Continue lipitor 40mg daily  recheck lipids annually, will order today to be done prior to fu and next AWV  Goal LDL <  Lab Results   Component Value Date    CHLPL 111 05/24/2021    TRIG 115 05/24/2021    HDL 41 05/24/2021    LDL 47 (L) 05/24/2021

## 2021-12-09 ENCOUNTER — LAB (OUTPATIENT)
Dept: LAB | Facility: HOSPITAL | Age: 61
End: 2021-12-09

## 2021-12-09 DIAGNOSIS — E11.65 TYPE 2 DIABETES MELLITUS WITH HYPERGLYCEMIA, WITHOUT LONG-TERM CURRENT USE OF INSULIN (HCC): Chronic | ICD-10-CM

## 2021-12-09 LAB
ALBUMIN SERPL-MCNC: 4.4 G/DL (ref 3.5–5.2)
ALBUMIN UR-MCNC: 1.2 MG/DL
ALBUMIN/GLOB SERPL: 2.2 G/DL
ALP SERPL-CCNC: 116 U/L (ref 39–117)
ALT SERPL W P-5'-P-CCNC: 35 U/L (ref 1–33)
ANION GAP SERPL CALCULATED.3IONS-SCNC: 9.4 MMOL/L (ref 5–15)
AST SERPL-CCNC: 26 U/L (ref 1–32)
BILIRUB SERPL-MCNC: 0.6 MG/DL (ref 0–1.2)
BUN SERPL-MCNC: 14 MG/DL (ref 8–23)
BUN/CREAT SERPL: 19.7 (ref 7–25)
CALCIUM SPEC-SCNC: 9.3 MG/DL (ref 8.6–10.5)
CHLORIDE SERPL-SCNC: 105 MMOL/L (ref 98–107)
CHOLEST SERPL-MCNC: 117 MG/DL (ref 0–200)
CO2 SERPL-SCNC: 28.6 MMOL/L (ref 22–29)
CREAT SERPL-MCNC: 0.71 MG/DL (ref 0.57–1)
CREAT UR-MCNC: 85.3 MG/DL
FOLATE SERPL-MCNC: >20 NG/ML (ref 4.78–24.2)
GFR SERPL CREATININE-BSD FRML MDRD: 83 ML/MIN/1.73
GLOBULIN UR ELPH-MCNC: 2 GM/DL
GLUCOSE SERPL-MCNC: 245 MG/DL (ref 65–99)
HBA1C MFR BLD: 7.99 % (ref 4.8–5.6)
HDLC SERPL-MCNC: 37 MG/DL (ref 40–60)
LDLC SERPL CALC-MCNC: 54 MG/DL (ref 0–100)
LDLC/HDLC SERPL: 1.34 {RATIO}
MICROALBUMIN/CREAT UR: 14.1 MG/G
POTASSIUM SERPL-SCNC: 4.1 MMOL/L (ref 3.5–5.2)
PROT SERPL-MCNC: 6.4 G/DL (ref 6–8.5)
SODIUM SERPL-SCNC: 143 MMOL/L (ref 136–145)
TRIGL SERPL-MCNC: 152 MG/DL (ref 0–150)
VIT B12 BLD-MCNC: 914 PG/ML (ref 211–946)
VLDLC SERPL-MCNC: 26 MG/DL (ref 5–40)

## 2021-12-09 PROCEDURE — 80061 LIPID PANEL: CPT

## 2021-12-09 PROCEDURE — 82043 UR ALBUMIN QUANTITATIVE: CPT | Performed by: FAMILY MEDICINE

## 2021-12-09 PROCEDURE — 36415 COLL VENOUS BLD VENIPUNCTURE: CPT

## 2021-12-09 PROCEDURE — 82746 ASSAY OF FOLIC ACID SERUM: CPT

## 2021-12-09 PROCEDURE — 82570 ASSAY OF URINE CREATININE: CPT | Performed by: FAMILY MEDICINE

## 2021-12-09 PROCEDURE — 80053 COMPREHEN METABOLIC PANEL: CPT

## 2021-12-09 PROCEDURE — 83036 HEMOGLOBIN GLYCOSYLATED A1C: CPT

## 2021-12-09 PROCEDURE — 82607 VITAMIN B-12: CPT

## 2021-12-10 DIAGNOSIS — F51.01 PRIMARY INSOMNIA: Chronic | ICD-10-CM

## 2021-12-13 RX ORDER — TEMAZEPAM 30 MG/1
CAPSULE ORAL
Qty: 30 CAPSULE | Refills: 4 | Status: SHIPPED | OUTPATIENT
Start: 2021-12-13 | End: 2022-05-02

## 2022-01-17 ENCOUNTER — HOSPITAL ENCOUNTER (OUTPATIENT)
Dept: GENERAL RADIOLOGY | Facility: HOSPITAL | Age: 62
Discharge: HOME | End: 2022-01-17

## 2022-01-17 ENCOUNTER — OFFICE VISIT (OUTPATIENT)
Dept: FAMILY MEDICINE CLINIC | Facility: CLINIC | Age: 62
End: 2022-01-17

## 2022-01-17 VITALS
HEART RATE: 83 BPM | OXYGEN SATURATION: 97 % | DIASTOLIC BLOOD PRESSURE: 76 MMHG | SYSTOLIC BLOOD PRESSURE: 114 MMHG | WEIGHT: 144.1 LBS | BODY MASS INDEX: 24.6 KG/M2 | RESPIRATION RATE: 16 BRPM | HEIGHT: 64 IN

## 2022-01-17 DIAGNOSIS — R06.02 SOB (SHORTNESS OF BREATH): ICD-10-CM

## 2022-01-17 DIAGNOSIS — M54.6 ACUTE RIGHT-SIDED THORACIC BACK PAIN: Primary | ICD-10-CM

## 2022-01-17 DIAGNOSIS — I10 PRIMARY HYPERTENSION: Chronic | ICD-10-CM

## 2022-01-17 DIAGNOSIS — M54.6 ACUTE RIGHT-SIDED THORACIC BACK PAIN: ICD-10-CM

## 2022-01-17 PROCEDURE — 71046 X-RAY EXAM CHEST 2 VIEWS: CPT

## 2022-01-17 PROCEDURE — 99214 OFFICE O/P EST MOD 30 MIN: CPT | Performed by: FAMILY MEDICINE

## 2022-01-17 PROCEDURE — 72072 X-RAY EXAM THORAC SPINE 3VWS: CPT

## 2022-01-17 RX ORDER — NAPROXEN 500 MG/1
500 TABLET ORAL 2 TIMES DAILY WITH MEALS
Qty: 30 TABLET | Refills: 1 | Status: SHIPPED | OUTPATIENT
Start: 2022-01-17 | End: 2022-05-05

## 2022-01-17 RX ORDER — CYCLOBENZAPRINE HCL 10 MG
10 TABLET ORAL 3 TIMES DAILY PRN
Qty: 90 TABLET | Refills: 0 | Status: SHIPPED | OUTPATIENT
Start: 2022-01-17 | End: 2022-12-19

## 2022-01-17 NOTE — PROGRESS NOTES
"Chief Complaint  Back Pain (hurts to breathe x 1week )    Subjective          Consuelo Carlson presents to [facility]  She is here today for an acute visit.  She has past medical history significant for type 2 diabetes, hypertension, hyperlipidemia, anxiety, depression, insomnia and episodic low back pain.    She is having an 8/10 pain in her right throraxic spine that has been present for 1 week. Nothing makes it better and movement makes it worse.  She is having some shortness of breath from time to time when she takes a deep breath.  But it is mostly due to pain.  She denies any nausea, vomiting or fevers.    The patient has no other complaints today and denies chest pain, weakness, numbness, diarrhea, dizziness or syncopal event.        Objective   Vital Signs:   /76 (BP Location: Left arm, Patient Position: Sitting)   Pulse 83   Resp 16   Ht 162.6 cm (64\")   Wt 65.4 kg (144 lb 1.6 oz)   SpO2 97%   BMI 24.73 kg/m²     Physical Exam  Vitals reviewed.   Constitutional:       Appearance: Normal appearance. She is well-developed.   HENT:      Head: Normocephalic and atraumatic.      Right Ear: External ear normal.      Left Ear: External ear normal.      Mouth/Throat:      Pharynx: No oropharyngeal exudate.   Eyes:      Conjunctiva/sclera: Conjunctivae normal.      Pupils: Pupils are equal, round, and reactive to light.   Neck:      Vascular: No carotid bruit.   Cardiovascular:      Rate and Rhythm: Normal rate and regular rhythm.      Heart sounds: No murmur heard.  No friction rub. No gallop.    Pulmonary:      Effort: Pulmonary effort is normal.      Breath sounds: Normal breath sounds. No wheezing or rhonchi.   Abdominal:      General: Bowel sounds are normal. There is no distension.      Palpations: Abdomen is soft.      Tenderness: There is no abdominal tenderness.   Musculoskeletal:      Comments: Tenderness appreciated of the thoracic paraspinal muscles from " the shoulder blade down to the T11 region.   Skin:     General: Skin is warm and dry.   Neurological:      Mental Status: She is alert and oriented to person, place, and time.      Cranial Nerves: No cranial nerve deficit.      Motor: No weakness.   Psychiatric:         Mood and Affect: Mood and affect normal.         Behavior: Behavior normal.         Thought Content: Thought content normal.         Judgment: Judgment normal.        Result Review :     CMP    CMP 5/24/21 12/9/21   Glucose 155 (A) 245 (A)   BUN 15 14   Creatinine 0.77 0.71   eGFR Non African Am  83   Sodium 142 143   Potassium 4.3 4.1   Chloride 103 105   Calcium 9.1 9.3   Albumin 4.2 4.40   Total Bilirubin 0.39 0.6   Alkaline Phosphatase 93 116   AST (SGOT) 17 26   ALT (SGPT) 18 35 (A)   (A) Abnormal value                Lipid Panel    Lipid Panel 5/24/21 12/9/21   Total Cholesterol  117   Total Cholesterol 111    Triglycerides 115 152 (A)   HDL Cholesterol 41 37 (A)   VLDL Cholesterol 23 26   LDL Cholesterol  47 (A) 54   LDL/HDL Ratio  1.34   (A) Abnormal value       Comments are available for some flowsheets but are not being displayed.                         Assessment and Plan    Diagnoses and all orders for this visit:    1. Acute right-sided thoracic back pain (Primary)  Comments:  The patient's back pain appears to be related to muscle spasms of the paraspinal thoracic muscles.  She was given order today for muscle relaxer and naproxen.  Orders:  -     Cancel: XR Spine Thoracic 3 View (In Office)  -     XR Spine Thoracic 3 View; Future    2. Primary hypertension  Assessment & Plan:  Hypertension is improving with treatment.  Continue current treatment regimen.  Dietary sodium restriction.  Weight loss.  Blood pressure will be reassessed at the next regular appointment.      3. SOB (shortness of breath)  Comments:  Her shortness of breath most likely is due to muscle spasms of the thoracic spine.  She was given order today for a chest x-ray  to be managed according findings  Orders:  -     XR Chest 2 View; Future    Other orders  -     cyclobenzaprine (FLEXERIL) 10 MG tablet; Take 1 tablet by mouth 3 (Three) Times a Day As Needed for Muscle Spasms.  Dispense: 90 tablet; Refill: 0  -     naproxen (Naprosyn) 500 MG tablet; Take 1 tablet by mouth 2 (Two) Times a Day With Meals.  Dispense: 30 tablet; Refill: 1      Follow Up   Return if symptoms worsen or fail to improve.  Patient was given instructions and counseling regarding her condition or for health maintenance advice. Please see specific information pulled into the AVS if appropriate.

## 2022-01-18 ENCOUNTER — TELEPHONE (OUTPATIENT)
Dept: FAMILY MEDICINE CLINIC | Facility: CLINIC | Age: 62
End: 2022-01-18

## 2022-01-18 NOTE — TELEPHONE ENCOUNTER
----- Message from Shaye Barajas DO sent at 1/17/2022 12:19 PM EST -----  Results in [employer]

## 2022-03-17 ENCOUNTER — OFFICE VISIT (OUTPATIENT)
Dept: FAMILY MEDICINE CLINIC | Facility: CLINIC | Age: 62
End: 2022-03-17

## 2022-03-17 VITALS
TEMPERATURE: 98.2 F | BODY MASS INDEX: 24.79 KG/M2 | OXYGEN SATURATION: 100 % | RESPIRATION RATE: 18 BRPM | HEIGHT: 64 IN | DIASTOLIC BLOOD PRESSURE: 85 MMHG | SYSTOLIC BLOOD PRESSURE: 145 MMHG | HEART RATE: 83 BPM | WEIGHT: 145.2 LBS

## 2022-03-17 DIAGNOSIS — J30.9 ALLERGIC RHINITIS, UNSPECIFIED SEASONALITY, UNSPECIFIED TRIGGER: ICD-10-CM

## 2022-03-17 DIAGNOSIS — R05.9 COUGH: ICD-10-CM

## 2022-03-17 DIAGNOSIS — R09.81 NASAL CONGESTION: ICD-10-CM

## 2022-03-17 DIAGNOSIS — I10 PRIMARY HYPERTENSION: Chronic | ICD-10-CM

## 2022-03-17 DIAGNOSIS — E11.9 TYPE 2 DIABETES MELLITUS WITHOUT COMPLICATION, WITHOUT LONG-TERM CURRENT USE OF INSULIN: Chronic | ICD-10-CM

## 2022-03-17 DIAGNOSIS — H66.90 ACUTE OTITIS MEDIA, UNSPECIFIED OTITIS MEDIA TYPE: Primary | ICD-10-CM

## 2022-03-17 PROCEDURE — 99214 OFFICE O/P EST MOD 30 MIN: CPT | Performed by: NURSE PRACTITIONER

## 2022-03-17 RX ORDER — AMOXICILLIN 500 MG/1
500 CAPSULE ORAL 2 TIMES DAILY
Qty: 20 CAPSULE | Refills: 0 | Status: SHIPPED | OUTPATIENT
Start: 2022-03-17 | End: 2022-03-27

## 2022-03-17 RX ORDER — GUAIFENESIN 600 MG/1
1200 TABLET, EXTENDED RELEASE ORAL 2 TIMES DAILY
Qty: 40 TABLET | Refills: 2 | Status: SHIPPED | OUTPATIENT
Start: 2022-03-17 | End: 2022-05-24

## 2022-03-17 NOTE — PROGRESS NOTES
"Chief Complaint   Patient presents with   • Sinusitis   • Earache       Subjective          Consuelo Carlson presents to [facility]    Pt presents with R ear pain, nasal congestion x 1 week. States she is having a lot of drainage down the back of her throat, causing her to cough. Denies fever, chills, SOB, or other. Pt has had the covid vaccine. Taking mucinex D to treat.       Past History:  Medical History: has a past medical history of Anxiety and depression, Arthritis, DDD (degenerative disc disease), cervical, Diabetes mellitus (HCC), Hypertension, Macular degeneration, Seasonal allergies, Spinal stenosis, and Tachycardia.   Surgical History: has a past surgical history that includes Anterior cervical discectomy w/ fusion; Tonsillectomy;  section; Tubal ligation; Hysterectomy; Back surgery; Breast Reduction (Bilateral, 4/3/2017); Reduction mammaplasty; Knee surgery; and Colonoscopy.   Family History: family history includes Breast cancer in her maternal aunt.   Social History: reports that she has never smoked. She has never used smokeless tobacco. She reports that she does not drink alcohol and does not use drugs.  Allergies: Patient has no known allergies.  (Not in a hospital admission)       Social History     Socioeconomic History   • Marital status:    Tobacco Use   • Smoking status: Never Smoker   • Smokeless tobacco: Never Used   Vaping Use   • Vaping Use: Never used   Substance and Sexual Activity   • Alcohol use: No   • Drug use: No   • Sexual activity: Yes     Partners: Male       Health Maintenance Due   Topic Date Due   • DIABETIC FOOT EXAM  Never done       Objective     Vital Signs:   /85   Pulse 83   Temp 98.2 °F (36.8 °C)   Resp 18   Ht 162.6 cm (64\")   Wt 65.9 kg (145 lb 3.2 oz)   SpO2 100%   BMI 24.92 kg/m²       Physical Exam  Vitals reviewed.   Constitutional:       General: She is not in acute distress.     Appearance: Normal " appearance.   HENT:      Head: Normocephalic.      Right Ear: Tenderness present. Tympanic membrane is erythematous.      Left Ear: Drainage present. Tympanic membrane is scarred.      Ears:      Comments: Bilat hearing aids     Nose: Nose normal.      Mouth/Throat:      Pharynx: Oropharynx is clear. No posterior oropharyngeal erythema.   Eyes:      General: No scleral icterus.     Extraocular Movements: Extraocular movements intact.      Conjunctiva/sclera: Conjunctivae normal.      Pupils: Pupils are equal, round, and reactive to light.   Cardiovascular:      Rate and Rhythm: Normal rate and regular rhythm.      Pulses: Normal pulses.      Heart sounds: Normal heart sounds.   Pulmonary:      Effort: Pulmonary effort is normal.      Breath sounds: Normal breath sounds.   Abdominal:      General: Bowel sounds are normal.      Palpations: Abdomen is soft.   Musculoskeletal:         General: Normal range of motion.      Cervical back: Neck supple.   Lymphadenopathy:      Cervical: Cervical adenopathy present.      Right cervical: Posterior cervical adenopathy present.   Skin:     General: Skin is warm and dry.   Neurological:      Mental Status: She is alert and oriented to person, place, and time.   Psychiatric:         Mood and Affect: Mood normal.         Behavior: Behavior normal.         Thought Content: Thought content normal.         Judgment: Judgment normal.          Review of Systems   Constitutional: Negative for chills, fatigue and fever.   HENT: Positive for congestion, ear pain and sore throat. Negative for sinus pressure.    Eyes: Negative for blurred vision.   Respiratory: Positive for cough. Negative for shortness of breath.    Cardiovascular: Negative for chest pain, palpitations and leg swelling.   Gastrointestinal: Negative for abdominal pain, constipation, diarrhea, nausea, vomiting and GERD.   Musculoskeletal: Negative for arthralgias.   Skin: Negative for rash and skin lesions.   Neurological:  Negative for dizziness and headache.   Psychiatric/Behavioral: Negative for sleep disturbance, suicidal ideas and depressed mood. The patient is not nervous/anxious.         Result Review :{Labs  Result Review  Imaging  Med Tab  Media  Procedures :23}     Common labs    Common Labsle 5/24/21 12/9/21 12/9/21 12/9/21 12/9/21     [id number]   Glucose 155 (A)   245 (A)    BUN 15   14    Creatinine 0.77   0.71    eGFR Non African Am    83    Sodium 142   143    Potassium 4.3   4.1    Chloride 103   105    Calcium 9.1   9.3    Albumin 4.2   4.40    Total Bilirubin 0.39   0.6    Alkaline Phosphatase 93   116    AST (SGOT) 17   26    ALT (SGPT) 18   35 (A)    Total Cholesterol     117   Total Cholesterol 111       Triglycerides 115    152 (A)   HDL Cholesterol 41    37 (A)   LDL Cholesterol  47 (A)    54   Hemoglobin A1C 6.8 (A) 7.99 (A)      Microalbumin, Urine   1.2     (A) Abnormal value       Comments are available for some flowsheets but are not being displayed.                     Assessment and Plan    Diagnoses and all orders for this visit:    1. Acute otitis media, unspecified otitis media type (Primary)  Comments:  amoxicillin 500mg PO bid x 10 days   Tylenol/ibuprofen UAD PRN pain  heat 20 min TID prn affected ear   Keep ears covered when outside     2. Primary hypertension  Assessment & Plan:  Hypertension is elevated today, advised to DC decongestant, take plain mucinex only, sent to pharm. .  Continue current treatment regimen.  Dietary sodium restriction.  Regular aerobic exercise.  Blood pressure will be reassessed at the next regular appointment.      3. Type 2 diabetes mellitus without complication, without long-term current use of insulin ([facility])  Assessment & Plan:  Diabetes is improving with treatment.   Continue current treatment regimen.  Diabetes will be reassessed in 6 months.      4. Allergic rhinitis, unspecified seasonality, unspecified trigger  Comments:  Recommend daily fluticasone  and antihistamine use    5. Nasal congestion  Comments:  change to plain mucinex to avoid increased BP       6. Cough    Other orders  -     amoxicillin (AMOXIL) 500 MG capsule; Take 1 capsule by mouth 2 (Two) Times a Day for 10 days.  Dispense: 20 capsule; Refill: 0  -     guaiFENesin (Mucinex) 600 MG 12 hr tablet; Take 2 tablets by mouth 2 (Two) Times a Day.  Dispense: 40 tablet; Refill: 2        Follow Up   Return if symptoms worsen or fail to improve.  Patient was given instructions and counseling regarding her condition or for health maintenance advice. Please see specific information pulled into the AVS if appropriate.

## 2022-03-17 NOTE — ASSESSMENT & PLAN NOTE
Hypertension is elevated today, advised to DC decongestant, take plain mucinex only, sent to pharm. .  Continue current treatment regimen.  Dietary sodium restriction.  Regular aerobic exercise.  Blood pressure will be reassessed at the next regular appointment.

## 2022-05-02 DIAGNOSIS — F51.01 PRIMARY INSOMNIA: Chronic | ICD-10-CM

## 2022-05-02 RX ORDER — TEMAZEPAM 30 MG/1
CAPSULE ORAL
Qty: 30 CAPSULE | Refills: 3 | Status: SHIPPED | OUTPATIENT
Start: 2022-05-02 | End: 2022-09-06 | Stop reason: SDUPTHER

## 2022-05-02 RX ORDER — METFORMIN HYDROCHLORIDE 500 MG/1
TABLET, EXTENDED RELEASE ORAL
Qty: 360 TABLET | Refills: 1 | Status: SHIPPED | OUTPATIENT
Start: 2022-05-02 | End: 2022-09-06 | Stop reason: SDUPTHER

## 2022-05-05 ENCOUNTER — OFFICE VISIT (OUTPATIENT)
Dept: OBSTETRICS AND GYNECOLOGY | Facility: CLINIC | Age: 62
End: 2022-05-05

## 2022-05-05 ENCOUNTER — PROCEDURE VISIT (OUTPATIENT)
Dept: OBSTETRICS AND GYNECOLOGY | Facility: CLINIC | Age: 62
End: 2022-05-05

## 2022-05-05 ENCOUNTER — APPOINTMENT (OUTPATIENT)
Dept: WOMENS IMAGING | Facility: HOSPITAL | Age: 62
End: 2022-05-05

## 2022-05-05 VITALS
SYSTOLIC BLOOD PRESSURE: 134 MMHG | WEIGHT: 141 LBS | BODY MASS INDEX: 24.07 KG/M2 | DIASTOLIC BLOOD PRESSURE: 89 MMHG | HEIGHT: 64 IN

## 2022-05-05 DIAGNOSIS — Z12.31 VISIT FOR SCREENING MAMMOGRAM: Primary | ICD-10-CM

## 2022-05-05 DIAGNOSIS — Z01.419 ENCOUNTER FOR GYNECOLOGICAL EXAMINATION WITHOUT ABNORMAL FINDING: Primary | ICD-10-CM

## 2022-05-05 DIAGNOSIS — M85.80 OSTEOPENIA, UNSPECIFIED LOCATION: ICD-10-CM

## 2022-05-05 DIAGNOSIS — N76.3 CHRONIC VULVITIS: ICD-10-CM

## 2022-05-05 DIAGNOSIS — Z12.39 ENCOUNTER FOR BREAST CANCER SCREENING USING NON-MAMMOGRAM MODALITY: ICD-10-CM

## 2022-05-05 PROCEDURE — 99396 PREV VISIT EST AGE 40-64: CPT | Performed by: OBSTETRICS & GYNECOLOGY

## 2022-05-05 PROCEDURE — 77063 BREAST TOMOSYNTHESIS BI: CPT | Performed by: OBSTETRICS & GYNECOLOGY

## 2022-05-05 PROCEDURE — 77067 SCR MAMMO BI INCL CAD: CPT | Performed by: OBSTETRICS & GYNECOLOGY

## 2022-05-05 PROCEDURE — 77063 BREAST TOMOSYNTHESIS BI: CPT | Performed by: RADIOLOGY

## 2022-05-05 PROCEDURE — 77067 SCR MAMMO BI INCL CAD: CPT | Performed by: RADIOLOGY

## 2022-05-05 RX ORDER — CLOBETASOL PROPIONATE 0.5 MG/G
CREAM TOPICAL EVERY OTHER DAY
Qty: 30 G | Refills: 2 | Status: SHIPPED | OUTPATIENT
Start: 2022-05-05

## 2022-05-05 RX ORDER — LIDOCAINE 50 MG/G
PATCH TOPICAL EVERY 12 HOURS
COMMUNITY

## 2022-05-05 NOTE — PROGRESS NOTES
Subjective    Chief Complaint   Patient presents with   • Gynecologic Exam     AE      History of Present Illness    Consuelo Carlson is a 62 y.o. female who presents for annual exam.  Non-smoker.  Mammogram today.  DEXA scan 2021 showed osteopenia.  Colonoscopy due in .  Previous hysterectomy with removal of 1 ovary.  Takes clobetasol 0 0.05% cream occasionally for chronic vulvitis.  No current problems.    Obstetric History:  OB History        2    Para   2    Term   2            AB        Living           SAB        IAB        Ectopic        Molar        Multiple        Live Births                   Menstrual History:     No LMP recorded. Patient has had a hysterectomy.       Past Medical History:   Diagnosis Date   • Anxiety and depression    • Arthritis    • DDD (degenerative disc disease), cervical    • Diabetes mellitus (HCC)    • Hypertension    • Macular degeneration     RIGHT EYE   • Seasonal allergies    • Spinal stenosis    • Tachycardia      Family History   Problem Relation Age of Onset   • Breast cancer Maternal Aunt      Social History     Tobacco Use   Smoking Status Never Smoker   Smokeless Tobacco Never Used         The following portions of the patient's history were reviewed and updated as appropriate: allergies, current medications, past family history, past medical history, past social history, past surgical history and problem list.    Review of Systems   Constitutional: Negative.  Negative for fever and unexpected weight change.   HENT: Negative.    Respiratory: Negative for shortness of breath and wheezing.    Cardiovascular: Negative for chest pain, palpitations and leg swelling.   Gastrointestinal: Negative for abdominal pain, anal bleeding and blood in stool.   Genitourinary: Negative for dysuria, pelvic pain, urgency, vaginal bleeding, vaginal discharge and vaginal pain.   Skin: Negative.    Neurological: Negative.    Hematological: Negative.  Negative for  "adenopathy.   Psychiatric/Behavioral: Negative.  Negative for dysphoric mood. The patient is not nervous/anxious.             Objective   Physical Exam  Vitals reviewed. Exam conducted with a chaperone present.   Constitutional:       Appearance: She is well-developed.   HENT:      Head: Normocephalic.   Eyes:      Pupils: Pupils are equal, round, and reactive to light.   Neck:      Thyroid: No thyromegaly.      Trachea: No tracheal deviation.   Cardiovascular:      Rate and Rhythm: Normal rate and regular rhythm.      Heart sounds: Normal heart sounds. No murmur heard.  Pulmonary:      Effort: Pulmonary effort is normal. No respiratory distress.      Breath sounds: Normal breath sounds.   Chest:   Breasts:      Right: Normal. No mass, nipple discharge, tenderness or axillary adenopathy.      Left: Normal. No mass, nipple discharge, tenderness or axillary adenopathy.       Abdominal:      Palpations: Abdomen is soft. There is no mass.      Tenderness: There is no abdominal tenderness.      Hernia: No hernia is present.   Genitourinary:     General: Normal vulva.      Labia:         Right: No tenderness or lesion.         Left: No tenderness or lesion.       Urethra: No prolapse or urethral lesion.      Vagina: Normal. No vaginal discharge.      Uterus: Absent.       Adnexa:         Right: No fullness.          Left: No fullness.        Rectum: Normal. No external hemorrhoid or internal hemorrhoid. Normal anal tone.      Comments: External genitalia normal  Lymphadenopathy:      Cervical: No cervical adenopathy.      Upper Body:      Right upper body: No axillary adenopathy.      Left upper body: No axillary adenopathy.   Skin:     General: Skin is warm and dry.      Findings: No rash.   Neurological:      Mental Status: She is alert and oriented to person, place, and time.   Psychiatric:         Behavior: Behavior normal.         /89   Ht 162.6 cm (64\")   Wt 64 kg (141 lb)   BMI 24.20 kg/m² "     Assessment/Plan   Diagnoses and all orders for this visit:    1. Encounter for gynecological examination without abnormal finding (Primary)  -     IGP,rfx Aptima HPV All Pth    2. Osteopenia, unspecified location    3. Encounter for breast cancer screening using non-mammogram modality    4. Chronic vulvitis    Other orders  -     clobetasol (TEMOVATE) 0.05 % cream; Apply  topically to the appropriate area as directed Every Other Day.  Dispense: 30 g; Refill: 2        Mammogram.  Return to office 1 to 2 years.  Counseled about colorectal screening every 10 years.  Counseled about continuing calcium with vitamin D daily.  We will continue occasional clobetasol for chronic vulvitis which is doing fine.

## 2022-05-16 DIAGNOSIS — R92.1 BREAST CALCIFICATION, RIGHT: ICD-10-CM

## 2022-05-16 DIAGNOSIS — R92.8 ABNORMALITY OF RIGHT BREAST ON SCREENING MAMMOGRAM: Primary | ICD-10-CM

## 2022-05-18 ENCOUNTER — TELEPHONE (OUTPATIENT)
Dept: OBSTETRICS AND GYNECOLOGY | Facility: CLINIC | Age: 62
End: 2022-05-18

## 2022-05-18 NOTE — TELEPHONE ENCOUNTER
Tesha, I do not see a referral yet.  Will you please follow up on this pt, I will be out of the office on Thursday & Friday.    Thank you!  Stephania

## 2022-05-19 ENCOUNTER — APPOINTMENT (OUTPATIENT)
Dept: WOMENS IMAGING | Facility: HOSPITAL | Age: 62
End: 2022-05-19

## 2022-05-19 PROCEDURE — G0279 TOMOSYNTHESIS, MAMMO: HCPCS | Performed by: RADIOLOGY

## 2022-05-19 PROCEDURE — 77065 DX MAMMO INCL CAD UNI: CPT | Performed by: RADIOLOGY

## 2022-05-19 RX ORDER — CLOBETASOL PROPIONATE 0.5 MG/G
CREAM TOPICAL
COMMUNITY
Start: 2022-05-05 | End: 2022-05-24

## 2022-05-23 DIAGNOSIS — R92.8 ABNORMALITY OF RIGHT BREAST ON SCREENING MAMMOGRAM: ICD-10-CM

## 2022-05-23 DIAGNOSIS — R92.1 BREAST CALCIFICATION, RIGHT: ICD-10-CM

## 2022-05-24 ENCOUNTER — LAB (OUTPATIENT)
Dept: LAB | Facility: HOSPITAL | Age: 62
End: 2022-05-24

## 2022-05-24 ENCOUNTER — OFFICE VISIT (OUTPATIENT)
Dept: FAMILY MEDICINE CLINIC | Facility: CLINIC | Age: 62
End: 2022-05-24

## 2022-05-24 VITALS
WEIGHT: 145.2 LBS | OXYGEN SATURATION: 99 % | HEIGHT: 64 IN | HEART RATE: 74 BPM | SYSTOLIC BLOOD PRESSURE: 123 MMHG | DIASTOLIC BLOOD PRESSURE: 65 MMHG | TEMPERATURE: 98.6 F | BODY MASS INDEX: 24.79 KG/M2 | RESPIRATION RATE: 18 BRPM

## 2022-05-24 DIAGNOSIS — D69.6 PLATELETS DECREASED: ICD-10-CM

## 2022-05-24 DIAGNOSIS — F51.01 PRIMARY INSOMNIA: Chronic | ICD-10-CM

## 2022-05-24 DIAGNOSIS — E11.9 TYPE 2 DIABETES MELLITUS WITHOUT COMPLICATION, WITHOUT LONG-TERM CURRENT USE OF INSULIN: Chronic | ICD-10-CM

## 2022-05-24 DIAGNOSIS — H35.3212 EXUDATIVE AGE-RELATED MACULAR DEGENERATION, RIGHT EYE, WITH INACTIVE CHOROIDAL NEOVASCULARIZATION: ICD-10-CM

## 2022-05-24 DIAGNOSIS — E78.2 MIXED HYPERLIPIDEMIA: ICD-10-CM

## 2022-05-24 DIAGNOSIS — I10 PRIMARY HYPERTENSION: ICD-10-CM

## 2022-05-24 DIAGNOSIS — I10 PRIMARY HYPERTENSION: Primary | Chronic | ICD-10-CM

## 2022-05-24 DIAGNOSIS — F51.01 PRIMARY INSOMNIA: ICD-10-CM

## 2022-05-24 DIAGNOSIS — K21.9 GASTROESOPHAGEAL REFLUX DISEASE WITHOUT ESOPHAGITIS: Chronic | ICD-10-CM

## 2022-05-24 DIAGNOSIS — E11.9 TYPE 2 DIABETES MELLITUS WITHOUT COMPLICATION, WITHOUT LONG-TERM CURRENT USE OF INSULIN: ICD-10-CM

## 2022-05-24 DIAGNOSIS — E78.2 MIXED HYPERLIPIDEMIA: Chronic | ICD-10-CM

## 2022-05-24 DIAGNOSIS — F32.5 MAJOR DEPRESSIVE DISORDER IN FULL REMISSION, UNSPECIFIED WHETHER RECURRENT: Chronic | ICD-10-CM

## 2022-05-24 LAB
ALBUMIN SERPL-MCNC: 4.3 G/DL (ref 3.5–5.2)
ALBUMIN/GLOB SERPL: 2.4 G/DL
ALP SERPL-CCNC: 79 U/L (ref 39–117)
ALT SERPL W P-5'-P-CCNC: 24 U/L (ref 1–33)
ANION GAP SERPL CALCULATED.3IONS-SCNC: 15.2 MMOL/L (ref 5–15)
AST SERPL-CCNC: 22 U/L (ref 1–32)
BASOPHILS # BLD AUTO: 0.03 10*3/MM3 (ref 0–0.2)
BASOPHILS NFR BLD AUTO: 0.7 % (ref 0–1.5)
BILIRUB SERPL-MCNC: 0.4 MG/DL (ref 0–1.2)
BUN SERPL-MCNC: 10 MG/DL (ref 8–23)
BUN/CREAT SERPL: 15.2 (ref 7–25)
CALCIUM SPEC-SCNC: 8.8 MG/DL (ref 8.6–10.5)
CHLORIDE SERPL-SCNC: 103 MMOL/L (ref 98–107)
CHOLEST SERPL-MCNC: 108 MG/DL (ref 0–200)
CO2 SERPL-SCNC: 23.8 MMOL/L (ref 22–29)
CREAT SERPL-MCNC: 0.66 MG/DL (ref 0.57–1)
DEPRECATED RDW RBC AUTO: 42.8 FL (ref 37–54)
EGFRCR SERPLBLD CKD-EPI 2021: 98.1 ML/MIN/1.73
EOSINOPHIL # BLD AUTO: 0.23 10*3/MM3 (ref 0–0.4)
EOSINOPHIL NFR BLD AUTO: 5.6 % (ref 0.3–6.2)
ERYTHROCYTE [DISTWIDTH] IN BLOOD BY AUTOMATED COUNT: 13.4 % (ref 12.3–15.4)
GLOBULIN UR ELPH-MCNC: 1.8 GM/DL
GLUCOSE SERPL-MCNC: 175 MG/DL (ref 65–99)
HBA1C MFR BLD: 6.8 % (ref 4.8–5.6)
HCT VFR BLD AUTO: 39.6 % (ref 34–46.6)
HDLC SERPL-MCNC: 39 MG/DL (ref 40–60)
HGB BLD-MCNC: 12.9 G/DL (ref 12–15.9)
IMM GRANULOCYTES # BLD AUTO: 0.01 10*3/MM3 (ref 0–0.05)
IMM GRANULOCYTES NFR BLD AUTO: 0.2 % (ref 0–0.5)
LDLC SERPL CALC-MCNC: 41 MG/DL (ref 0–100)
LDLC/HDLC SERPL: 0.9 {RATIO}
LYMPHOCYTES # BLD AUTO: 0.9 10*3/MM3 (ref 0.7–3.1)
LYMPHOCYTES NFR BLD AUTO: 21.8 % (ref 19.6–45.3)
MCH RBC QN AUTO: 28.4 PG (ref 26.6–33)
MCHC RBC AUTO-ENTMCNC: 32.6 G/DL (ref 31.5–35.7)
MCV RBC AUTO: 87.2 FL (ref 79–97)
MONOCYTES # BLD AUTO: 0.23 10*3/MM3 (ref 0.1–0.9)
MONOCYTES NFR BLD AUTO: 5.6 % (ref 5–12)
NEUTROPHILS NFR BLD AUTO: 2.73 10*3/MM3 (ref 1.7–7)
NEUTROPHILS NFR BLD AUTO: 66.1 % (ref 42.7–76)
NRBC BLD AUTO-RTO: 0 /100 WBC (ref 0–0.2)
PLATELET # BLD AUTO: 135 10*3/MM3 (ref 140–450)
PMV BLD AUTO: 11.2 FL (ref 6–12)
POTASSIUM SERPL-SCNC: 3.7 MMOL/L (ref 3.5–5.2)
PROT SERPL-MCNC: 6.1 G/DL (ref 6–8.5)
RBC # BLD AUTO: 4.54 10*6/MM3 (ref 3.77–5.28)
SODIUM SERPL-SCNC: 142 MMOL/L (ref 136–145)
T4 FREE SERPL-MCNC: 1.29 NG/DL (ref 0.93–1.7)
TRIGL SERPL-MCNC: 169 MG/DL (ref 0–150)
TSH SERPL DL<=0.05 MIU/L-ACNC: 2.6 UIU/ML (ref 0.27–4.2)
VLDLC SERPL-MCNC: 28 MG/DL (ref 5–40)
WBC NRBC COR # BLD: 4.13 10*3/MM3 (ref 3.4–10.8)

## 2022-05-24 PROCEDURE — 80061 LIPID PANEL: CPT

## 2022-05-24 PROCEDURE — 99214 OFFICE O/P EST MOD 30 MIN: CPT | Performed by: FAMILY MEDICINE

## 2022-05-24 PROCEDURE — 85025 COMPLETE CBC W/AUTO DIFF WBC: CPT

## 2022-05-24 PROCEDURE — 84439 ASSAY OF FREE THYROXINE: CPT

## 2022-05-24 PROCEDURE — 80053 COMPREHEN METABOLIC PANEL: CPT

## 2022-05-24 PROCEDURE — 83036 HEMOGLOBIN GLYCOSYLATED A1C: CPT

## 2022-05-24 PROCEDURE — 84443 ASSAY THYROID STIM HORMONE: CPT

## 2022-05-24 PROCEDURE — 36415 COLL VENOUS BLD VENIPUNCTURE: CPT

## 2022-05-24 RX ORDER — ORAL SEMAGLUTIDE 7 MG/1
1 TABLET ORAL DAILY
Qty: 30 TABLET | Refills: 2 | Status: SHIPPED | OUTPATIENT
Start: 2022-05-24 | End: 2022-06-29 | Stop reason: SINTOL

## 2022-06-09 ENCOUNTER — TELEPHONE (OUTPATIENT)
Dept: FAMILY MEDICINE CLINIC | Facility: CLINIC | Age: 62
End: 2022-06-09

## 2022-06-09 NOTE — TELEPHONE ENCOUNTER
Caller: Consuelo Carlson    Relationship: Self    Best call back number: [phone]    What medications are you currently taking:   Current Outpatient Medications on File Prior to Visit   Medication Sig Dispense Refill   • aspirin 81 MG EC tablet Take 81 mg by mouth.     • atorvastatin (LIPITOR) 40 MG tablet Take 1 tablet by mouth Every Night. 90 tablet 3   • clobetasol (TEMOVATE) 0.05 % cream Apply  topically to the appropriate area as directed Every Other Day. 30 g 2   • cyclobenzaprine (FLEXERIL) 10 MG tablet Take 1 tablet by mouth 3 (Three) Times a Day As Needed for Muscle Spasms. 90 tablet 0   • gabapentin (NEURONTIN) 600 MG tablet TAKE 1 TABLET BY MOUTH THREE TIMES DAILY AS DIRECTED FOR PAIN (MAY CAUSE DROWSINESS)     • lidocaine (LIDODERM) 5 % Every 12 (Twelve) Hours.     • losartan (COZAAR) 25 MG tablet Take 1 tablet by mouth Daily. 90 tablet 3   • metFORMIN ER (GLUCOPHAGE-XR) 500 MG 24 hr tablet TAKE 2 TABLETS BY MOUTH TWICE DAILY WITH MEALS TO LOWER BLOOD SUGAR 360 tablet 1   • metoprolol succinate XL (TOPROL-XL) 50 MG 24 hr tablet Take 1 tablet by mouth Daily. for blood pressure 90 tablet 3   • Multiple Vitamins-Minerals (EYE VITAMINS PO) Take 1 tablet by mouth Daily. HOLD PRIOR TO SURGERY     • Semaglutide (Rybelsus) 7 MG tablet Take 7 mg by mouth Daily. 30 tablet 2   • Semaglutide 3 MG tablet Take 1 tablet by mouth Daily. 30 tablet 0   • temazepam (RESTORIL) 30 MG capsule TAKE 1 CAPSULE BY MOUTH AT BEDTIME AS NEEDED 30 capsule 3   • venlafaxine XR (EFFEXOR-XR) 150 MG 24 hr capsule Take 1 capsule by mouth Daily. 90 capsule 3     No current facility-administered medications on file prior to visit.          When did you start taking these medications: 05/24/2022    Which medication are you concerned about: CAMILLA    Who prescribed you this medication: SERENITY THOMASON     What are your concerns: SUGAR LEVEL READINGS ARE HIGHER     How long have you had these concerns: A COUPLE OF WEEKS     PATIENT WAS  GIVEN A 30 SAMPLE OF RYBELSUS AND SHE IS NOT FEELING ANY DIFFERENT BUT HER SUGAR LEVELS ARE MUCH HIGHER 297  WERE TWO OF THE LAST READINGS, SHE WAS TRYING TO CHANGE BECAUSE SHE DID NOT LIKE THE WAY THE METFORMIN MADE HER FEEL, SHE HAS A PRESCRIPTION FOR A HIGHER DOSAGE OF THE RYBELSUS WHICH SHE HAS NOT FILLED YET PLEASE CONTACT AND ADVISE WHAT SHE NEEDS TO DO, SHE WOULD LIKE TO GET INFORMATION PRIOR TO THE WEEKEND

## 2022-06-09 NOTE — TELEPHONE ENCOUNTER
Patient given RYBELSUS on 05/24/22, BG is elevated- 297 and 357  Was previously taking metformin, does not like how medication worked

## 2022-06-10 NOTE — TELEPHONE ENCOUNTER
I called her and instructed her to take 1/2 of her metformin 500 mg XR bid and see how it helped her blood sugar.

## 2022-06-15 ENCOUNTER — TELEPHONE (OUTPATIENT)
Dept: FAMILY MEDICINE CLINIC | Facility: CLINIC | Age: 62
End: 2022-06-15

## 2022-06-15 NOTE — TELEPHONE ENCOUNTER
Caller: Consuelo Carlson    Relationship: Self    Best call back number: [phone]    What is the best time to reach you: ANYTIME    Who are you requesting to speak with (clinical staff, provider,  specific staff member): CLINICAL        What was the call regarding:     THE PATIENT SAID IS WANTING A CALL TO DISCUSS THE Semaglutide (Rybelsus) 7 MG tablet.  SHE SAID IT IS GOING TO COST $181. SHE IS WANTING TO KNOW IF THERE IS ANOTHER ALTERNATIVE.         PLEASE CALL AND ADVISE       Do you require a callback: YES

## 2022-06-15 NOTE — TELEPHONE ENCOUNTER
I have an application for prescription assistance she can fill out, the other medicines in the same class as rybelsus are once weekly shots if she would want to change to one of those

## 2022-06-20 NOTE — TELEPHONE ENCOUNTER
Patient stated that when she gets back in town she will check with the pharmacy about other medications and coast, then call us back and let us know what she is going to do.

## 2022-06-29 ENCOUNTER — OFFICE VISIT (OUTPATIENT)
Dept: FAMILY MEDICINE CLINIC | Facility: CLINIC | Age: 62
End: 2022-06-29

## 2022-06-29 VITALS
HEIGHT: 64 IN | TEMPERATURE: 97.7 F | HEART RATE: 89 BPM | BODY MASS INDEX: 24.12 KG/M2 | WEIGHT: 141.3 LBS | RESPIRATION RATE: 18 BRPM | SYSTOLIC BLOOD PRESSURE: 109 MMHG | DIASTOLIC BLOOD PRESSURE: 67 MMHG | OXYGEN SATURATION: 95 %

## 2022-06-29 DIAGNOSIS — L30.9 DERMATITIS: Primary | ICD-10-CM

## 2022-06-29 DIAGNOSIS — E11.65 TYPE 2 DIABETES MELLITUS WITH HYPERGLYCEMIA, WITHOUT LONG-TERM CURRENT USE OF INSULIN: Chronic | ICD-10-CM

## 2022-06-29 DIAGNOSIS — F32.5 MAJOR DEPRESSIVE DISORDER IN FULL REMISSION, UNSPECIFIED WHETHER RECURRENT: Chronic | ICD-10-CM

## 2022-06-29 DIAGNOSIS — H35.3212 EXUDATIVE AGE-RELATED MACULAR DEGENERATION, RIGHT EYE, WITH INACTIVE CHOROIDAL NEOVASCULARIZATION: ICD-10-CM

## 2022-06-29 DIAGNOSIS — E78.2 MIXED HYPERLIPIDEMIA: Chronic | ICD-10-CM

## 2022-06-29 DIAGNOSIS — I10 PRIMARY HYPERTENSION: Chronic | ICD-10-CM

## 2022-06-29 DIAGNOSIS — D69.6 PLATELETS DECREASED: ICD-10-CM

## 2022-06-29 PROCEDURE — 99214 OFFICE O/P EST MOD 30 MIN: CPT | Performed by: FAMILY MEDICINE

## 2022-06-29 RX ORDER — MUPIROCIN CALCIUM 20 MG/G
1 CREAM TOPICAL 3 TIMES DAILY
Qty: 30 G | Refills: 0 | Status: SHIPPED | OUTPATIENT
Start: 2022-06-29 | End: 2022-12-19

## 2022-06-29 RX ORDER — CEPHALEXIN 500 MG/1
500 CAPSULE ORAL 2 TIMES DAILY
Qty: 14 CAPSULE | Refills: 0 | Status: SHIPPED | OUTPATIENT
Start: 2022-06-29 | End: 2022-09-06

## 2022-09-06 ENCOUNTER — OFFICE VISIT (OUTPATIENT)
Dept: FAMILY MEDICINE CLINIC | Facility: CLINIC | Age: 62
End: 2022-09-06

## 2022-09-06 VITALS
BODY MASS INDEX: 23.8 KG/M2 | TEMPERATURE: 98.6 F | DIASTOLIC BLOOD PRESSURE: 71 MMHG | WEIGHT: 139.4 LBS | RESPIRATION RATE: 16 BRPM | OXYGEN SATURATION: 100 % | HEART RATE: 78 BPM | SYSTOLIC BLOOD PRESSURE: 121 MMHG | HEIGHT: 64 IN

## 2022-09-06 DIAGNOSIS — E78.2 MIXED HYPERLIPIDEMIA: Chronic | ICD-10-CM

## 2022-09-06 DIAGNOSIS — Z79.899 MEDICATION MANAGEMENT: ICD-10-CM

## 2022-09-06 DIAGNOSIS — F32.5 MAJOR DEPRESSIVE DISORDER IN FULL REMISSION, UNSPECIFIED WHETHER RECURRENT: Chronic | ICD-10-CM

## 2022-09-06 DIAGNOSIS — E11.65 TYPE 2 DIABETES MELLITUS WITH HYPERGLYCEMIA, WITHOUT LONG-TERM CURRENT USE OF INSULIN: Chronic | ICD-10-CM

## 2022-09-06 DIAGNOSIS — K21.9 GASTROESOPHAGEAL REFLUX DISEASE WITHOUT ESOPHAGITIS: ICD-10-CM

## 2022-09-06 DIAGNOSIS — I10 PRIMARY HYPERTENSION: Chronic | ICD-10-CM

## 2022-09-06 DIAGNOSIS — F51.01 PRIMARY INSOMNIA: Primary | Chronic | ICD-10-CM

## 2022-09-06 DIAGNOSIS — E11.42 DIABETIC POLYNEUROPATHY ASSOCIATED WITH TYPE 2 DIABETES MELLITUS: Chronic | ICD-10-CM

## 2022-09-06 DIAGNOSIS — E16.1 NOCTURNAL HYPOGLYCEMIA: ICD-10-CM

## 2022-09-06 DIAGNOSIS — E11.65 HYPERGLYCEMIA DUE TO DIABETES MELLITUS: ICD-10-CM

## 2022-09-06 LAB
POC AMPHETAMINES: NEGATIVE
POC BARBITURATES: NEGATIVE
POC BENZODIAZEPHINES: POSITIVE
POC COCAINE: NEGATIVE
POC METHADONE: NEGATIVE
POC METHAMPHETAMINE SCREEN URINE: NEGATIVE
POC OPIATES: NEGATIVE
POC OXYCODONE: NEGATIVE
POC PHENCYCLIDINE: NEGATIVE
POC PROPOXYPHENE: NEGATIVE
POC THC: NEGATIVE
POC TRICYCLIC ANTIDEPRESSANTS: NEGATIVE

## 2022-09-06 PROCEDURE — 99214 OFFICE O/P EST MOD 30 MIN: CPT | Performed by: FAMILY MEDICINE

## 2022-09-06 PROCEDURE — 80305 DRUG TEST PRSMV DIR OPT OBS: CPT | Performed by: FAMILY MEDICINE

## 2022-09-06 RX ORDER — TEMAZEPAM 30 MG/1
30 CAPSULE ORAL NIGHTLY PRN
Qty: 30 CAPSULE | Refills: 5 | Status: SHIPPED | OUTPATIENT
Start: 2022-09-06 | End: 2023-03-08

## 2022-09-06 RX ORDER — METOPROLOL SUCCINATE 50 MG/1
50 TABLET, EXTENDED RELEASE ORAL DAILY
Qty: 90 TABLET | Refills: 3 | Status: SHIPPED | OUTPATIENT
Start: 2022-09-06

## 2022-09-06 RX ORDER — LOSARTAN POTASSIUM 25 MG/1
25 TABLET ORAL DAILY
Qty: 90 TABLET | Refills: 3 | Status: SHIPPED | OUTPATIENT
Start: 2022-09-06 | End: 2023-09-06

## 2022-09-06 RX ORDER — VENLAFAXINE HYDROCHLORIDE 150 MG/1
150 CAPSULE, EXTENDED RELEASE ORAL DAILY
Qty: 90 CAPSULE | Refills: 3 | Status: SHIPPED | OUTPATIENT
Start: 2022-09-06

## 2022-09-06 RX ORDER — PROCHLORPERAZINE 25 MG/1
SUPPOSITORY RECTAL DAILY
Qty: 3 EACH | Refills: 3 | Status: SHIPPED | OUTPATIENT
Start: 2022-09-06 | End: 2022-12-05

## 2022-09-06 RX ORDER — METOPROLOL SUCCINATE 50 MG/1
50 TABLET, EXTENDED RELEASE ORAL DAILY
Qty: 90 TABLET | Refills: 3 | Status: SHIPPED | OUTPATIENT
Start: 2022-09-06 | End: 2022-09-06

## 2022-09-06 RX ORDER — METFORMIN HYDROCHLORIDE 500 MG/1
500 TABLET, EXTENDED RELEASE ORAL 2 TIMES DAILY WITH MEALS
Qty: 360 TABLET | Refills: 3 | Status: SHIPPED | OUTPATIENT
Start: 2022-09-06 | End: 2022-12-19 | Stop reason: SDUPTHER

## 2022-09-06 RX ORDER — PROCHLORPERAZINE 25 MG/1
1 SUPPOSITORY RECTAL DAILY
Qty: 1 EACH | Refills: 3 | Status: SHIPPED | OUTPATIENT
Start: 2022-09-06 | End: 2022-12-05

## 2022-09-06 RX ORDER — VENLAFAXINE HYDROCHLORIDE 150 MG/1
150 CAPSULE, EXTENDED RELEASE ORAL DAILY
Qty: 90 CAPSULE | Refills: 3 | Status: SHIPPED | OUTPATIENT
Start: 2022-09-06 | End: 2022-09-06

## 2022-09-06 RX ORDER — PROCHLORPERAZINE 25 MG/1
1 SUPPOSITORY RECTAL DAILY
Qty: 1 EACH | Refills: 2 | Status: SHIPPED | OUTPATIENT
Start: 2022-09-06 | End: 2022-12-19

## 2022-09-06 RX ORDER — ATORVASTATIN CALCIUM 40 MG/1
40 TABLET, FILM COATED ORAL NIGHTLY
Qty: 90 TABLET | Refills: 3 | Status: SHIPPED | OUTPATIENT
Start: 2022-09-06 | End: 2023-01-04

## 2022-09-06 NOTE — PROGRESS NOTES
Chief Complaint  Med Refill and Diabetes    Subjective        Consuelo Carlson presents to [facility]  History of Present Illness     The patient is a 62-year-old female who presents today for follow-up on chronic conditions. She was last seen on 7/20/2022. At that time, she was being treated for dermatitis and other chronic conditions. Her last lipid panel was obtained on 5/22/2022. LDL was at goal at 41 mg/dL. Her A1c was last evaluated on 5/24/2022, and was 6.8 percent. CMP was unremarkable and CBC was good when last evaluated in 05/2022. Platelets have been 135 10*3/mm3 in the past, this will be re-evaluated and monitored for possible thrombocytopenia. Chronic B12 was normal when last checked in 2021. Microalbumin was last obtained on 12/20/2021, and the ratio was under 30 mg/g. She is taking appropriate medicines. Diabetic foot exam today was unremarkable and unchanged overall. She does have diabetic neuropathy and follows with pain management. The patient takes gabapentin for relief and pain. KASPAR reviewed. Additionally, she is taking temazepam for sleep chronically. Other medicines have been attempted, but have not been successful. Therefore, she will continue with this mediation and she was counseled on the risks and benefits.    The patient is no longer taking Rybelsus due to cost, although she would like to attempt to better manage her blood glucose herself. She reports maintaining her weight by monitoring daily. The patient is interested in obtaining a Dexcom continuous glucose monitoring system. She states that a low blood glucose reading, for her, is 100 mg/dL or less, but this is infrequent. However, she adds that her blood glucose is elevated intermittently and fluctuation is noted. The patient would like to undergo a diabetic foot evaluation as she has not obtained a foot evaluation in the past. She denies any wounds on her feet, but she does report neuropathy  Rapid Response Note    Triggers for RRT: Bloody output in ostomy bag    History of Present Illness:  Meet Larios is a 36 year old male with a history of spina bifida, end-stage renal disease on hemodialysis, congestive heart failure, coronary artery disease and decubitus ulcer status post diverting colostomy admitted on 7/1/2022 with shortness of breath and abdominal pain.  Found to be in respiratory failure with a pH of 7.1 and PCO2 in the 90s.  Placed on BiPAP that he intermittently refuses.  Complained of diffuse abdominal pain.  CT abdomen and pelvis without contrast without major acute intra-abdominal process.  CT PE unremarkable.  Earlier this morning, complained of abdominal pain for which an abdominal x-ray was performed and was unremarkable.  Later this morning, nurse noticed bloody output in ostomy bag.  No a.m. labs.    Physical Examination: Somnolent but arousable.  On BiPAP.  Follows simple commands.  Cardiopulmonary examination unremarkable.  Mild diffuse abdominal tenderness.  Bowel sounds present.  Colostomy bag with blood tinged brown stool.    Provisional Diagnosis:   1. Acute on chronic hypoxic hypercapnic respiratory failure  2. Abdominal pain and bloody ostomy output: Acutely, differential diagnosis includes peptic ulcer disease, non-specific colitis and mesenteric ischemia.    Plan:  · ABG obtained and redemonstrated pH 7.1 with elevated PCO2  · Systolic blood pressure remained stable at 90  · Difficulty obtaining labs: CBC, CMP, magnesium, phosphorus, lactic acid  · Ensured to functioning peripheral IVs  · Type and screen  · 1 unit of packed RBCs ordered (no transfusion ordered)  · CTA abdomen pelvis with and without contrast to assess source of bleeding and rule out mesenteric ischemia  · IV pantoprazole 80 mg once followed by 40 mg twice daily  · Hold chemical DVT prophylaxis and aspirin  · GI consulted  · Transfer to MICU    Case discussed with: Dr Law (ICU senior), Dr Herrera (GI) and  "and numbness. She states she is uncertain if her neuropathy is attributed to her back or her diabetes mellitus. The patient reports a family history of uncontrolled diabetes mellitus in her sister who is 5 years younger.     The patient takes temazepam for sleep chronically, and she would like a refill on this medication as she has 3 tablets left.    The patient is up-to-date with her COVID-19 vaccinations, but she is not interested in obtaining a 3rd COVID-19 vaccination.     Objective   Vital Signs:  /71   Pulse 78   Temp 98.6 °F (37 °C)   Resp 16   Ht 162.6 cm (64\")   Wt 63.2 kg (139 lb 6.4 oz)   SpO2 100%   BMI 23.93 kg/m²   Estimated body mass index is 23.93 kg/m² as calculated from the following:    Height as of this encounter: 162.6 cm (64\").    Weight as of this encounter: 63.2 kg (139 lb 6.4 oz).    BMI is within normal parameters. No other follow-up for BMI required.      Physical Exam  Vitals reviewed.   Constitutional:       Appearance: Normal appearance. She is well-developed.   HENT:      Head: Normocephalic and atraumatic.      Right Ear: External ear normal.      Left Ear: External ear normal.      Nose: Nose normal.   Eyes:      Conjunctiva/sclera: Conjunctivae normal.      Pupils: Pupils are equal, round, and reactive to light.   Cardiovascular:      Rate and Rhythm: Normal rate.   Pulmonary:      Effort: Pulmonary effort is normal.      Breath sounds: Normal breath sounds.   Abdominal:      General: There is no distension.   Feet:      Right foot:      Protective Sensation: 3 sites tested. 3 sites sensed.      Skin integrity: Skin integrity normal. No ulcer or blister.      Toenail Condition: Right toenails are normal.      Left foot:      Protective Sensation: 3 sites tested. 3 sites sensed.      Skin integrity: Skin integrity normal. No ulcer or blister.      Toenail Condition: Left toenails are normal.      Comments: Diabetic Foot Exam Performed     Skin:     General: Skin is warm " Dr Barillas (hospitalist)     Monik Vanegas MD  MOD     and dry.   Neurological:      General: No focal deficit present.      Mental Status: She is alert and oriented to person, place, and time.   Psychiatric:         Mood and Affect: Mood and affect normal.         Behavior: Behavior normal.         Thought Content: Thought content normal.         Judgment: Judgment normal.        Result Review :  The following data was reviewed by: Paul Choi DO on 09/06/2022:  Common labs    Common Labs 12/9/21 12/9/21 12/9/21 12/9/21 5/24/22 5/24/22 5/24/22 5/24/22    0842 0842 0842 0842 1041 1041 1041 1041   Glucose   245 (A)    175 (A)    BUN   14    10    Creatinine   0.71    0.66    eGFR Non African Am   83        Sodium   143    142    Potassium   4.1    3.7    Chloride   105    103    Calcium   9.3    8.8    Albumin   4.40    4.30    Total Bilirubin   0.6    0.4    Alkaline Phosphatase   116    79    AST (SGOT)   26    22    ALT (SGPT)   35 (A)    24    WBC     4.13      Hemoglobin     12.9      Hematocrit     39.6      Platelets     135 (A)      Total Cholesterol    117    108   Triglycerides    152 (A)    169 (A)   HDL Cholesterol    37 (A)    39 (A)   LDL Cholesterol     54    41   Hemoglobin A1C 7.99 (A)     6.80 (A)     Microalbumin, Urine  1.2         (A) Abnormal value                      Assessment and Plan   Diagnoses and all orders for this visit:    1. Primary insomnia (Primary)  -     temazepam (RESTORIL) 30 MG capsule; Take 1 capsule by mouth At Night As Needed (nightly prn).  Dispense: 30 capsule; Refill: 5  -     Discontinue: venlafaxine XR (EFFEXOR-XR) 150 MG 24 hr capsule; Take 1 capsule by mouth Daily.  Dispense: 90 capsule; Refill: 3  -     venlafaxine XR (EFFEXOR-XR) 150 MG 24 hr capsule; Take 1 capsule by mouth Daily.  Dispense: 90 capsule; Refill: 3    2. Primary hypertension  -     atorvastatin (LIPITOR) 40 MG tablet; Take 1 tablet by mouth Every Night.  Dispense: 90 tablet; Refill: 3  -     Discontinue: metoprolol succinate XL (TOPROL-XL) 50 MG 24 hr  tablet; Take 1 tablet by mouth Daily. for blood pressure  Dispense: 90 tablet; Refill: 3  -     metoprolol succinate XL (TOPROL-XL) 50 MG 24 hr tablet; Take 1 tablet by mouth Daily. for blood pressure  Dispense: 90 tablet; Refill: 3    3. Type 2 diabetes mellitus with hyperglycemia, without long-term current use of insulin (HCC)  -     losartan (COZAAR) 25 MG tablet; Take 1 tablet by mouth Daily.  Dispense: 90 tablet; Refill: 3  -     CBC (No Diff); Future  -     Comprehensive metabolic panel; Future  -     Hemoglobin A1c; Future  -     Lipid panel; Future  -     TSH+Free T4; Future  -     Continuous Blood Gluc Transmit (Dexcom G6 Transmitter) misc; 1 Device Daily for 90 days.  Dispense: 1 each; Refill: 3  -     Continuous Blood Gluc Sensor (Dexcom G6 Sensor); Daily for 90 days.  Dispense: 3 each; Refill: 3  -     Continuous Blood Gluc  (Dexcom G6 ) device; 1 Device Daily.  Dispense: 1 each; Refill: 2    4. Major depressive disorder in full remission, unspecified whether recurrent ([facility])  -     Discontinue: venlafaxine XR (EFFEXOR-XR) 150 MG 24 hr capsule; Take 1 capsule by mouth Daily.  Dispense: 90 capsule; Refill: 3  -     venlafaxine XR (EFFEXOR-XR) 150 MG 24 hr capsule; Take 1 capsule by mouth Daily.  Dispense: 90 capsule; Refill: 3    5. Medication management  -     POC Urine Drug Screen, Triage    6. Mixed hyperlipidemia  -     CBC (No Diff); Future  -     Comprehensive metabolic panel; Future  -     Hemoglobin A1c; Future  -     Lipid panel; Future  -     TSH+Free T4; Future    7. Gastroesophageal reflux disease without esophagitis    8. Diabetic polyneuropathy associated with type 2 diabetes mellitus (HCC)    9. Hyperglycemia due to diabetes mellitus (HCC)  -     Continuous Blood Gluc Transmit (Dexcom G6 Transmitter) misc; 1 Device Daily for 90 days.  Dispense: 1 each; Refill: 3  -     Continuous Blood Gluc Sensor (Dexcom G6 Sensor); Daily for 90 days.  Dispense: 3 each; Refill: 3  -      Continuous Blood Gluc  (Dexcom G6 ) device; 1 Device Daily.  Dispense: 1 each; Refill: 2    10. Nocturnal hypoglycemia    Other orders  -     metFORMIN ER (GLUCOPHAGE-XR) 500 MG 24 hr tablet; Take 1 tablet by mouth 2 (Two) Times a Day With Meals.  Dispense: 360 tablet; Refill: 3    1. Diabetes mellitus type 2  - The patient presents for follow-up on diabetes mellitus type 2. She will attempt to obtain a Dexcom sensor for her intermittent nocturnal hypoglycemia as well as her issues with hypoglycemia that is difficult to control with frequent monitoring of her blood glucose at home. She will continue her medications as prescribed. Her medications will be ordered and sent to [facility] pharmacy. DME will be considered if necessary. Her A1c goal is less than 7 to 7.5 percent. A1c will be re-evaluated today or prior to her next visit. The patient's foot examination today is unremarkable. She is up-to-date on vaccinations and all other care plans for diabetes.    2. Hypertension  - The patient's blood pressure is controlled at goal of less than 140/90 mmHg.    3. Chronic insomnia, anxiety, and depression  - She is taking temazepam. CATALINA is reviewed. Contract was signed today. UDS appropriate. Her medication is refilled and she will follow-up at least every 6 months to continue managing chronic conditions.     The patient will return to follow up on Dexcom and measurement. After receiving these, she will return to review her blood glucose readings. She will follow up in 3 to 6 months, sooner if she has any concerns.         Follow Up   Return in about 3 months (around 12/6/2022), or if symptoms worsen or fail to improve, for Recheck.  Patient was given instructions and counseling regarding her condition or for health maintenance advice. Please see specific information pulled into the AVS if appropriate.     Transcribed from ambient dictation for Paul Choi DO by KALYANI JASON.  09/06/22    14:34 EDT    Patient verbalized consent to the visit recording.

## 2022-09-16 ENCOUNTER — TELEPHONE (OUTPATIENT)
Dept: FAMILY MEDICINE CLINIC | Facility: CLINIC | Age: 62
End: 2022-09-16

## 2022-09-16 DIAGNOSIS — E11.65 TYPE 2 DIABETES MELLITUS WITH HYPERGLYCEMIA, WITHOUT LONG-TERM CURRENT USE OF INSULIN: Chronic | ICD-10-CM

## 2022-09-16 DIAGNOSIS — E11.65 HYPERGLYCEMIA DUE TO DIABETES MELLITUS: ICD-10-CM

## 2022-09-16 RX ORDER — PROCHLORPERAZINE 25 MG/1
SUPPOSITORY RECTAL DAILY
Qty: 3 EACH | Refills: 3 | Status: CANCELLED | OUTPATIENT
Start: 2022-09-16 | End: 2022-12-15

## 2022-09-16 RX ORDER — PROCHLORPERAZINE 25 MG/1
1 SUPPOSITORY RECTAL DAILY
Qty: 1 EACH | Refills: 2 | Status: CANCELLED | OUTPATIENT
Start: 2022-09-16 | End: 2023-09-16

## 2022-09-16 RX ORDER — PROCHLORPERAZINE 25 MG/1
1 SUPPOSITORY RECTAL DAILY
Qty: 1 EACH | Refills: 3 | Status: CANCELLED | OUTPATIENT
Start: 2022-09-16 | End: 2022-12-15

## 2022-09-16 NOTE — TELEPHONE ENCOUNTER
Caller: Consuelo Carlson    Relationship: Self    Best call back number: [phone]    What is the best time to reach you: ANYTIME, CAN LEAVE A MESSAGE    Who are you requesting to speak with (clinical staff, provider,  specific staff member): NURSE     What was the call regarding: PATIENT IS CALLING STATING THE THE PHARMACY TOLD HER THAT THE DEXCOM IS NOT COVERED UNDER HER INSURANCE AND WOULD NEED TO BE SEND THROUGH TO A DIABETIC MAIL ORDER.     Do you require a callback: YES

## 2022-12-08 ENCOUNTER — FLU SHOT (OUTPATIENT)
Dept: FAMILY MEDICINE CLINIC | Facility: CLINIC | Age: 62
End: 2022-12-08

## 2022-12-08 DIAGNOSIS — Z23 NEED FOR INFLUENZA VACCINATION: Primary | ICD-10-CM

## 2022-12-08 DIAGNOSIS — Z23 FLU VACCINE NEED: ICD-10-CM

## 2022-12-08 PROCEDURE — G0008 ADMIN INFLUENZA VIRUS VAC: HCPCS | Performed by: FAMILY MEDICINE

## 2022-12-08 PROCEDURE — 90686 IIV4 VACC NO PRSV 0.5 ML IM: CPT | Performed by: FAMILY MEDICINE

## 2022-12-19 ENCOUNTER — OFFICE VISIT (OUTPATIENT)
Dept: DIABETES SERVICES | Facility: HOSPITAL | Age: 62
End: 2022-12-19

## 2022-12-19 VITALS
WEIGHT: 141 LBS | TEMPERATURE: 97.2 F | BODY MASS INDEX: 24.07 KG/M2 | HEART RATE: 83 BPM | OXYGEN SATURATION: 98 % | SYSTOLIC BLOOD PRESSURE: 121 MMHG | DIASTOLIC BLOOD PRESSURE: 73 MMHG | HEIGHT: 64 IN

## 2022-12-19 DIAGNOSIS — E11.9 TYPE 2 DIABETES MELLITUS WITHOUT COMPLICATION, WITHOUT LONG-TERM CURRENT USE OF INSULIN: ICD-10-CM

## 2022-12-19 DIAGNOSIS — E11.65 UNCONTROLLED TYPE 2 DIABETES MELLITUS WITH HYPERGLYCEMIA: Primary | ICD-10-CM

## 2022-12-19 LAB
EXPIRATION DATE: ABNORMAL
GLUCOSE BLDC GLUCOMTR-MCNC: 169 MG/DL (ref 70–99)
HBA1C MFR BLD: 7.2 %
Lab: ABNORMAL

## 2022-12-19 PROCEDURE — 82962 GLUCOSE BLOOD TEST: CPT | Performed by: NURSE PRACTITIONER

## 2022-12-19 PROCEDURE — 99204 OFFICE O/P NEW MOD 45 MIN: CPT | Performed by: NURSE PRACTITIONER

## 2022-12-19 PROCEDURE — G0463 HOSPITAL OUTPT CLINIC VISIT: HCPCS | Performed by: NURSE PRACTITIONER

## 2022-12-19 RX ORDER — METFORMIN HYDROCHLORIDE 500 MG/1
1000 TABLET, EXTENDED RELEASE ORAL 2 TIMES DAILY WITH MEALS
Qty: 360 TABLET | Refills: 1 | Status: SHIPPED | OUTPATIENT
Start: 2022-12-19

## 2022-12-19 RX ORDER — BLOOD-GLUCOSE SENSOR
1 EACH MISCELLANEOUS
Qty: 2 EACH | Refills: 5 | Status: SHIPPED | OUTPATIENT
Start: 2022-12-19

## 2022-12-19 NOTE — PROGRESS NOTES
Chief Complaint  Diabetes (New pt, est care with diabetic provider, med mgt, a1c eval , wants to talk about a CGM )    Referred By: DO Orlando Riley presents to [facility] for diabetes medication management    History of Present Illness    Visit type:  to establish care  Diabetes type:  Type 2  Age at time of dx/Year of dx/Number of years:  10 years  Family History of Diabetes: Mother, sister, paternal grandmother  Current diabetes status/concerns/issues: She has been also to the office by her primary care provider for assistance in managing her diabetes.  The patient is interested in a continuous glucose sensor but has had difficulty getting it improved with her insurance and in the past.  Other current health concerns: She has chronic back issues  Diabetes symptoms:    Polyuria: No   Polydipsia: No   Polyphagia: No   Blurred vision: No   Excessive fatigue: No  Diabetes complications:  Neuro: None  Renal: None  Eyes: She has macular degeneration in the right eye  Amputation/Wounds: None  GI: None  Cardiovascular: hypertension and tachycardia  ED: None  Other: None  Hospitalizations/ED/911 secondary to DM?  No  Hypoglycemia:  Relative - She starts having symptoms of hypoglycemia when her glucose level reaches around 100  Hypoglycemia Symptoms:  shaking/tremors  Current Diabetes treatment: Metformin  mg twice a day  Prior diabetes treatments: She has taken different medications but cannot recall their names.  Blood glucose device:  Meter  Blood glucose monitoring frequency:  random  Blood glucose range/average: She brings blood glucose logs to the appointment today that show fasting glucose levels are typically elevated in the 200s recently.  They are sometimes below 200 but stay above 150.  Random glucose levels are equally elevated.  Her lowest glucose levels on the glucose log sheet was 91 mg/dL  Dietary behavior:  Limits high  carb/sweet foods, Avoids sugary drinks, Number of meals each day - 1 main meal of the day; Number of snacks each day - Random snacks throughout the day, History of Diabetes Nutrition Counseling - YES, She reports she struggles mostly with pasta; she has cut out breads  Activity/Exercise:  She does not do any formal exercise but she tries to remain active  Last Eye Exam: ; Location:   Last Foot Exam:   Diabetes Education Hx: None  Social Determinants of Health: None    Past Medical History:   Diagnosis Date   • Anxiety and depression    • Arthritis    • DDD (degenerative disc disease), cervical    • Diabetes mellitus (HCC)    • Hypertension    • Macular degeneration     RIGHT EYE   • Seasonal allergies    • Spinal stenosis    • Tachycardia      Past Surgical History:   Procedure Laterality Date   • ANTERIOR CERVICAL DISCECTOMY W/ FUSION      AND INSTRUMENTATION   • BACK SURGERY     • BILATERAL BREAST REDUCTION Bilateral 2017    Procedure: BREAST REDUCTION BILATERAL;  Surgeon: Jorge Lebron MD;  Location: [facility];  Service:    •  SECTION     • COLONOSCOPY     • HYSTERECTOMY     • KNEE SURGERY Right    • TONSILLECTOMY     • TUBAL ABDOMINAL LIGATION       Family History   Problem Relation Age of Onset   • Diabetes Mother    • Diabetes Sister    • Diabetes Paternal Grandmother    • Breast cancer Maternal Aunt      Social History     Socioeconomic History   • Marital status:    • Number of children: 2   Tobacco Use   • Smoking status: Never   • Smokeless tobacco: Never   Vaping Use   • Vaping Use: Never used   Substance and Sexual Activity   • Alcohol use: No   • Drug use: No   • Sexual activity: Yes     Partners: Male     No Known Allergies    Current Outpatient Medications:   •  aspirin 81 MG EC tablet, Take 81 mg by mouth., Disp: , Rfl:   •  atorvastatin (LIPITOR) 40 MG tablet, Take 1 tablet by mouth Every Night., Disp: 90 tablet, Rfl: 3  •  clobetasol (TEMOVATE) 0.05 %  "cream, Apply  topically to the appropriate area as directed Every Other Day., Disp: 30 g, Rfl: 2  •  Diclofenac Sodium (VOLTAREN) 1 % gel gel, diclofenac 1 % topical gel, Disp: , Rfl:   •  gabapentin (NEURONTIN) 600 MG tablet, TAKE 1 TABLET BY MOUTH THREE TIMES DAILY AS DIRECTED FOR PAIN (MAY CAUSE DROWSINESS), Disp: , Rfl:   •  lidocaine (LIDODERM) 5 %, Every 12 (Twelve) Hours., Disp: , Rfl:   •  losartan (COZAAR) 25 MG tablet, Take 1 tablet by mouth Daily., Disp: 90 tablet, Rfl: 3  •  metFORMIN ER (GLUCOPHAGE-XR) 500 MG 24 hr tablet, Take 2 tablets by mouth 2 (Two) Times a Day With Meals., Disp: 360 tablet, Rfl: 1  •  metoprolol succinate XL (TOPROL-XL) 50 MG 24 hr tablet, Take 1 tablet by mouth Daily. for blood pressure, Disp: 90 tablet, Rfl: 3  •  Multiple Vitamins-Minerals (EYE VITAMINS PO), Take 1 tablet by mouth Daily. HOLD PRIOR TO SURGERY, Disp: , Rfl:   •  temazepam (RESTORIL) 30 MG capsule, Take 1 capsule by mouth At Night As Needed (nightly prn)., Disp: 30 capsule, Rfl: 5  •  venlafaxine XR (EFFEXOR-XR) 150 MG 24 hr capsule, Take 1 capsule by mouth Daily., Disp: 90 capsule, Rfl: 3  •  Continuous Blood Gluc Sensor (FreeStyle Wilma 3 Sensor) misc, 1 each Every 14 (Fourteen) Days., Disp: 2 each, Rfl: 5    Review of Systems   Constitutional: Negative for activity change, appetite change, fatigue, unexpected weight gain and unexpected weight loss.   Eyes: Positive for blurred vision (Right eye due to macular degeneration). Negative for visual disturbance.   Gastrointestinal: Positive for GERD. Negative for abdominal pain, constipation, diarrhea, nausea, vomiting and indigestion.   Endocrine: Negative for polydipsia, polyphagia and polyuria.   Neurological: Positive for numbness.        Objective     Vitals:    12/19/22 1259   BP: 121/73   BP Location: Right arm   Patient Position: Sitting   Cuff Size: Adult   Pulse: 83   Temp: 97.2 °F (36.2 °C)   SpO2: 98%   Weight: 64 kg (141 lb)   Height: 162.6 cm (64\") "   PainSc:   7     Body mass index is 24.2 kg/m².    Physical Exam  Constitutional:       Appearance: Normal appearance. She is normal weight.   HENT:      Head: Normocephalic and atraumatic.      Right Ear: External ear normal.      Left Ear: External ear normal.      Nose: Nose normal.   Eyes:      Extraocular Movements: Extraocular movements intact.      Conjunctiva/sclera: Conjunctivae normal.   Pulmonary:      Effort: Pulmonary effort is normal.   Musculoskeletal:         General: Normal range of motion.      Cervical back: Normal range of motion.   Skin:     General: Skin is warm and dry.   Neurological:      General: No focal deficit present.      Mental Status: She is alert and oriented to person, place, and time. Mental status is at baseline.   Psychiatric:         Mood and Affect: Mood normal.         Behavior: Behavior normal.         Thought Content: Thought content normal.         Judgment: Judgment normal.         Result Review :   The following data was reviewed by: ROBI Ma on 12/19/2022:    Most Recent A1C    HGBA1C Most Recent 12/19/22   Hemoglobin A1C 7.2             A1C Last 3 Results    HGBA1C Last 3 Results 5/24/22 12/19/22   Hemoglobin A1C 6.80 (A) 7.2   (A) Abnormal value            Point-of-care A1c in the office today is 7.2% indicating uncontrolled type 2 diabetes.  This is up from the proximal 6.8 collected in May of this year    Glucose   Date Value Ref Range Status   12/19/2022 169 (H) 70 - 99 mg/dL Final     Comment:     Serial Number: [id number]:  [id number]     Point-of-care glucose in the office today is within normal limits for nonfasting glucose          Assessment: The patient had an increase in her A1c.  Glucose logs do indicate glucose levels persistently above 200 in the mornings.  She states that previously she took 1000 mg of metformin twice a day but because glucose levels And better control they were able to reduce the medication.  She is here today  primarily because she would like to have a continuous glucose sensor.  She has been previously denied by her insurance for the device.  It was explained to the patient this is because she is not taking insulin which is a requirement for Medicare insurance for the continuous glucose sensor.  We did discuss self-pay for the wilma continuous glucose sensor and she is agreeable to the potential cost      Diagnoses and all orders for this visit:    1. Uncontrolled type 2 diabetes mellitus with hyperglycemia (HCC) (Primary)  -     POC Glycosylated Hemoglobin (Hb A1C)  -     metFORMIN ER (GLUCOPHAGE-XR) 500 MG 24 hr tablet; Take 2 tablets by mouth 2 (Two) Times a Day With Meals.  Dispense: 360 tablet; Refill: 1  -     Continuous Blood Gluc Sensor (FreeStyle Wilma 3 Sensor) misc; 1 each Every 14 (Fourteen) Days.  Dispense: 2 each; Refill: 5    2. Type 2 diabetes mellitus without complication, without long-term current use of insulin (HCC)    Other orders  -     POC Glucose        Plan: To help better control glucose levels the patient was instructed to increase her metformin extended release to 1000 mg twice a day.  We will also send a prescription to your pharmacy for the wilma 3 device.  The patient was given phone number for discount information.  If she needs any assistance in starting the device she will contact our office.  She will be scheduled for follow-up appointment in 3 months.    The patient will monitor her blood glucose levels once a day or using the continuous glucose sensor..  If she develops problematic hyperglycemia or hypoglycemia or adverse drug reactions, she will contact the office for further instructions.        Follow Up {Instructions Charge Capture  Follow-up Communications :23}    Return in about 3 months (around 3/19/2023) for Medication Management, CGM Follow-up.    Patient was given instructions and counseling regarding her condition or for health maintenance advice. Please see specific  information pulled into the AVS if appropriate.     Harini Sood, APRN  12/19/2022      Dictated Utilizing Dragon Dictation.  Please note that portions of this note were completed with a voice recognition program.  Part of this note may be an electronic transcription/translation of spoken language to printed text using the Dragon Dictation System.

## 2022-12-20 ENCOUNTER — TELEPHONE (OUTPATIENT)
Dept: DIABETES SERVICES | Facility: HOSPITAL | Age: 62
End: 2022-12-20

## 2022-12-20 ENCOUNTER — PATIENT ROUNDING (BHMG ONLY) (OUTPATIENT)
Dept: DIABETES SERVICES | Facility: HOSPITAL | Age: 62
End: 2022-12-20

## 2022-12-20 NOTE — PROGRESS NOTES
December 20, 2022    Hello, may I speak with Consuelo Carlson?    My name is Shannan Remy      I am  with [facility] [address]  [place] [address] [phone].    Before we get started may I verify your date of birth? 1960    I am calling to officially welcome you to our practice and ask about your recent visit. Is this a good time to talk? yes    Tell me about your visit with us. What things went well?  very informative       We're always looking for ways to make our patients' experiences even better. Do you have recommendations on ways we may improve?  no    Overall were you satisfied with your first visit to our practice? yes       I appreciate you taking the time to speak with me today. Is there anything else I can do for you? no      Thank you, and have a great day.

## 2022-12-20 NOTE — TELEPHONE ENCOUNTER
During rounding telephone call patient stated she had to pay $75.00 for the price of one Wilma 3.  I explained patient would need to contact pharmacy and explain she should have received 2 Wilma 3 sensors for that cost.   Sharon call from [facility] and stated she had not heard of the program where patient could receive two sensors at that cost.  I gave her the telephone number for the program.  [employer] called back and said Ms. Carlson was not qualified for the $75.00 program due to having Medicare.

## 2022-12-21 ENCOUNTER — TELEPHONE (OUTPATIENT)
Dept: DIABETES SERVICES | Facility: HOSPITAL | Age: 62
End: 2022-12-21

## 2023-01-04 DIAGNOSIS — I10 PRIMARY HYPERTENSION: Chronic | ICD-10-CM

## 2023-01-04 RX ORDER — ATORVASTATIN CALCIUM 40 MG/1
TABLET, FILM COATED ORAL
Qty: 90 TABLET | Refills: 2 | Status: SHIPPED | OUTPATIENT
Start: 2023-01-04

## 2023-03-07 DIAGNOSIS — F51.01 PRIMARY INSOMNIA: Chronic | ICD-10-CM

## 2023-03-07 NOTE — TELEPHONE ENCOUNTER
Med refill, patient last seen 9/6/22. UDS and consent done same day.  Med last filled same day w 5 refills.

## 2023-03-08 RX ORDER — TEMAZEPAM 30 MG/1
CAPSULE ORAL
Qty: 30 CAPSULE | Refills: 4 | Status: SHIPPED | OUTPATIENT
Start: 2023-03-08

## 2023-03-17 NOTE — PROGRESS NOTES
Chief Complaint  Diabetes (Follow up, med mgt, a1c eval, )    Referred By: DO Orlando Riley Maye Carlson presents to [facility] for diabetes medication management    History of Present Illness    Visit type:  follow-up  Diabetes type:  Type 2  Current diabetes status/concerns/issues:  She denies concerns regarding her diabetes.  Patient did start the wendy device but the pharmacy only gave her 1 sensor for what should have been the cost of 2.  Therefore she did not refill it.  Other health concerns: back issues  Current Diabetes symptoms:    Polyuria: No   Polydipsia: No   Polyphagia: No   Blurred vision: No   Excessive fatigue: No   Known Diabetes complications:  Neuro: None  Renal: None  Eyes: She has macular degeneration in the right eye  Amputation/Wounds: None  GI: None  Cardiovascular: hypertension and tachycardia  ED: None   Other: None  Hypoglycemia:  None reported at this time  Hypoglycemia Symptoms:  No hypoglycemia at this time  Current diabetes treatment:  Metformin ER 1000 mg twice a day     Blood glucose device:  Meter and Does Not Test  Blood glucose monitoring frequency:  None  Blood glucose range/average:  unknown     Glucose Source: N/A  Diet:  Limits high carb/sweet foods, Avoids sugary drinks  Activity/Exercise:  She has joined a fitness group at [employer]    Past Medical History:   Diagnosis Date   • Anxiety and depression    • Arthritis    • DDD (degenerative disc disease), cervical    • Diabetes mellitus (HCC)    • Hypertension    • Macular degeneration     RIGHT EYE   • Seasonal allergies    • Spinal stenosis    • Tachycardia      Past Surgical History:   Procedure Laterality Date   • ANTERIOR CERVICAL DISCECTOMY W/ FUSION      AND INSTRUMENTATION   • BACK SURGERY     • BILATERAL BREAST REDUCTION Bilateral 04/03/2017    Procedure: BREAST REDUCTION BILATERAL;  Surgeon: Jorge Lebron MD;  Location: [facility];  Service:    •   SECTION     • COLONOSCOPY     • HYSTERECTOMY     • KNEE SURGERY Right    • TONSILLECTOMY     • TUBAL ABDOMINAL LIGATION       Family History   Problem Relation Age of Onset   • Diabetes Mother    • Diabetes Sister    • Diabetes Paternal Grandmother    • Breast cancer Maternal Aunt      Social History     Socioeconomic History   • Marital status:    • Number of children: 2   Tobacco Use   • Smoking status: Never   • Smokeless tobacco: Never   Vaping Use   • Vaping Use: Never used   Substance and Sexual Activity   • Alcohol use: No   • Drug use: No   • Sexual activity: Yes     Partners: Male     No Known Allergies    Current Outpatient Medications:   •  aspirin 81 MG EC tablet, Take 1 tablet by mouth., Disp: , Rfl:   •  atorvastatin (LIPITOR) 40 MG tablet, TAKE 1 TABLET BY MOUTH AT BEDTIME, Disp: 90 tablet, Rfl: 2  •  clobetasol (TEMOVATE) 0.05 % cream, Apply  topically to the appropriate area as directed Every Other Day., Disp: 30 g, Rfl: 2  •  Continuous Blood Gluc Sensor (FreeStyle Wilma 3 Sensor) misc, 1 each Every 14 (Fourteen) Days., Disp: 2 each, Rfl: 5  •  Diclofenac Sodium (VOLTAREN) 1 % gel gel, diclofenac 1 % topical gel, Disp: , Rfl:   •  gabapentin (NEURONTIN) 600 MG tablet, TAKE 1 TABLET BY MOUTH THREE TIMES DAILY AS DIRECTED FOR PAIN (MAY CAUSE DROWSINESS), Disp: , Rfl:   •  lidocaine (LIDODERM) 5 %, Every 12 (Twelve) Hours., Disp: , Rfl:   •  losartan (COZAAR) 25 MG tablet, Take 1 tablet by mouth Daily., Disp: 90 tablet, Rfl: 3  •  metFORMIN ER (GLUCOPHAGE-XR) 500 MG 24 hr tablet, Take 2 tablets by mouth 2 (Two) Times a Day With Meals., Disp: 360 tablet, Rfl: 1  •  metoprolol succinate XL (TOPROL-XL) 50 MG 24 hr tablet, Take 1 tablet by mouth Daily. for blood pressure, Disp: 90 tablet, Rfl: 3  •  Multiple Vitamins-Minerals (EYE VITAMINS PO), Take 1 tablet by mouth Daily. HOLD PRIOR TO SURGERY, Disp: , Rfl:   •  temazepam (RESTORIL) 30 MG capsule, TAKE 1 CAPSULE BY MOUTH AT BEDTIME AS  "NEEDED, Disp: 30 capsule, Rfl: 4  •  venlafaxine XR (EFFEXOR-XR) 150 MG 24 hr capsule, Take 1 capsule by mouth Daily., Disp: 90 capsule, Rfl: 3    Review of Systems   Constitutional: Positive for activity change. Negative for appetite change, fatigue, unexpected weight gain and unexpected weight loss.   Eyes: Negative for blurred vision and visual disturbance.   Gastrointestinal: Positive for GERD. Negative for abdominal pain, constipation, diarrhea, nausea, vomiting and indigestion.   Endocrine: Negative for polydipsia, polyphagia and polyuria.   Neurological: Negative for numbness.        Objective     Vitals:    03/20/23 1354   BP: 116/75   BP Location: Right arm   Patient Position: Sitting   Cuff Size: Adult   Pulse: 85   Temp: 98.1 °F (36.7 °C)   SpO2: 97%   Weight: 64 kg (141 lb)   Height: 162.6 cm (64\")   PainSc:   7     Body mass index is 24.2 kg/m².    Physical Exam  Constitutional:       Appearance: Normal appearance. She is normal weight.   HENT:      Head: Normocephalic and atraumatic.      Right Ear: External ear normal.      Left Ear: External ear normal.      Nose: Nose normal.   Eyes:      Extraocular Movements: Extraocular movements intact.      Conjunctiva/sclera: Conjunctivae normal.   Pulmonary:      Effort: Pulmonary effort is normal.   Musculoskeletal:         General: Normal range of motion.      Cervical back: Normal range of motion.   Skin:     General: Skin is warm and dry.   Neurological:      General: No focal deficit present.      Mental Status: She is alert and oriented to person, place, and time. Mental status is at baseline.   Psychiatric:         Mood and Affect: Mood normal.         Behavior: Behavior normal.         Thought Content: Thought content normal.         Judgment: Judgment normal.         Result Review :   The following data was reviewed by: ROBI Ma on 03/20/2023:    Most Recent A1C    HGBA1C Most Recent 3/20/23   Hemoglobin A1C 6.6 (A)   (A) Abnormal " value              A1C Last 3 Results    HGBA1C Last 3 Results 5/24/22 12/19/22 3/20/23   Hemoglobin A1C 6.80 (A) 7.2 6.6 (A)   (A) Abnormal value            Point-of-care A1c in the office today 6.6% indicating controlled type 2 diabetes.  This is down from the prior result of 7.2 collected in December 2022    Glucose   Date Value Ref Range Status   03/20/2023 167 (H) 70 - 99 mg/dL Final     Comment:     Serial Number: [id number]:  [id number]     Point-of-care glucose in the office today is within normal for nonfasting glucose.          Assessment: The patient has had improvement in her A1c and is well controlled without problematic hypoglycemia.  She is tolerating her medication without difficulty.      Diagnoses and all orders for this visit:    1. Uncontrolled type 2 diabetes mellitus with hyperglycemia (HCC) (Primary)  -     POC Glycosylated Hemoglobin (Hb A1C)    2. Type 2 diabetes mellitus without complication, without long-term current use of insulin (HCC)    Other orders  -     POC Glucose        Plan: No changes were made to the treatment plan today.  The patient was given information to share with her pharmacy to ensure she is not being over charged for the wendy sensors.  If she has difficulty she will contact our office.    The patient will monitor her blood glucose levels using the continuous glucose monitor.  If she develops problematic hyperglycemia or hypoglycemia or adverse drug reactions, she will contact the office for further instructions.        Follow Up     Return in about 3 months (around 6/20/2023) for Medication Management.    Patient was given instructions and counseling regarding her condition or for health maintenance advice. Please see specific information pulled into the AVS if appropriate.     Harini Sood, ROBI  03/20/2023      Dictated Utilizing Dragon Dictation.  Please note that portions of this note were completed with a voice recognition program.  Part of this note  may be an electronic transcription/translation of spoken language to printed text using the Dragon Dictation System.

## 2023-03-20 ENCOUNTER — OFFICE VISIT (OUTPATIENT)
Dept: DIABETES SERVICES | Facility: HOSPITAL | Age: 63
End: 2023-03-20
Payer: MEDICARE

## 2023-03-20 VITALS
SYSTOLIC BLOOD PRESSURE: 116 MMHG | TEMPERATURE: 98.1 F | WEIGHT: 141 LBS | OXYGEN SATURATION: 97 % | BODY MASS INDEX: 24.07 KG/M2 | DIASTOLIC BLOOD PRESSURE: 75 MMHG | HEIGHT: 64 IN | HEART RATE: 85 BPM

## 2023-03-20 DIAGNOSIS — E11.65 UNCONTROLLED TYPE 2 DIABETES MELLITUS WITH HYPERGLYCEMIA: Primary | ICD-10-CM

## 2023-03-20 DIAGNOSIS — E11.9 TYPE 2 DIABETES MELLITUS WITHOUT COMPLICATION, WITHOUT LONG-TERM CURRENT USE OF INSULIN: ICD-10-CM

## 2023-03-20 LAB
EXPIRATION DATE: ABNORMAL
GLUCOSE BLDC GLUCOMTR-MCNC: 167 MG/DL (ref 70–99)
HBA1C MFR BLD: 6.6 %
Lab: ABNORMAL

## 2023-03-20 PROCEDURE — 1160F RVW MEDS BY RX/DR IN RCRD: CPT | Performed by: NURSE PRACTITIONER

## 2023-03-20 PROCEDURE — 3074F SYST BP LT 130 MM HG: CPT | Performed by: NURSE PRACTITIONER

## 2023-03-20 PROCEDURE — 3078F DIAST BP <80 MM HG: CPT | Performed by: NURSE PRACTITIONER

## 2023-03-20 PROCEDURE — 3044F HG A1C LEVEL LT 7.0%: CPT | Performed by: NURSE PRACTITIONER

## 2023-03-20 PROCEDURE — 82962 GLUCOSE BLOOD TEST: CPT | Performed by: NURSE PRACTITIONER

## 2023-03-20 PROCEDURE — 1159F MED LIST DOCD IN RCRD: CPT | Performed by: NURSE PRACTITIONER

## 2023-03-20 PROCEDURE — G0463 HOSPITAL OUTPT CLINIC VISIT: HCPCS | Performed by: NURSE PRACTITIONER

## 2023-03-20 PROCEDURE — 99213 OFFICE O/P EST LOW 20 MIN: CPT | Performed by: NURSE PRACTITIONER

## 2023-03-22 ENCOUNTER — TELEPHONE (OUTPATIENT)
Dept: DIABETES SERVICES | Facility: HOSPITAL | Age: 63
End: 2023-03-22
Payer: MEDICARE

## 2023-03-22 NOTE — TELEPHONE ENCOUNTER
Pt is having a hard time figureing out where to get her wendy 3. Insurance gave her dme company names and numbers  and she called, they do not take her insurance. I told her I will work on this and all, that I believe its [facility] she will need to go through. I let her know I will keep her updated. Pt understood and said thank you.

## 2023-03-28 ENCOUNTER — TELEPHONE (OUTPATIENT)
Dept: DIABETES SERVICES | Facility: HOSPITAL | Age: 63
End: 2023-03-28
Payer: MEDICARE

## 2023-03-28 NOTE — TELEPHONE ENCOUNTER
Called [employer], they are the [employer] company that takes her insurance, I gave the pt their number and she is calling to make an account with them, once she does that, [employer] will reach out to us for everything they need. Pt understood

## 2023-05-08 ENCOUNTER — OFFICE VISIT (OUTPATIENT)
Dept: OBSTETRICS AND GYNECOLOGY | Facility: CLINIC | Age: 63
End: 2023-05-08
Payer: MEDICARE

## 2023-05-08 ENCOUNTER — PROCEDURE VISIT (OUTPATIENT)
Dept: OBSTETRICS AND GYNECOLOGY | Facility: CLINIC | Age: 63
End: 2023-05-08
Payer: MEDICARE

## 2023-05-08 VITALS
SYSTOLIC BLOOD PRESSURE: 109 MMHG | DIASTOLIC BLOOD PRESSURE: 74 MMHG | HEIGHT: 64 IN | WEIGHT: 138 LBS | BODY MASS INDEX: 23.56 KG/M2

## 2023-05-08 DIAGNOSIS — N76.3 CHRONIC VULVITIS: ICD-10-CM

## 2023-05-08 DIAGNOSIS — M85.80 OSTEOPENIA, UNSPECIFIED LOCATION: ICD-10-CM

## 2023-05-08 DIAGNOSIS — M85.88 OTHER SPECIFIED DISORDERS OF BONE DENSITY AND STRUCTURE, OTHER SITE: ICD-10-CM

## 2023-05-08 DIAGNOSIS — Z01.411 ENCOUNTER FOR GYNECOLOGICAL EXAMINATION WITH ABNORMAL FINDING: Primary | ICD-10-CM

## 2023-05-08 DIAGNOSIS — Z12.31 VISIT FOR SCREENING MAMMOGRAM: Primary | ICD-10-CM

## 2023-05-08 NOTE — PROGRESS NOTES
Subjective    Chief Complaint   Patient presents with   • Gynecologic Exam     AE, Mammo today       History of Present Illness    Consuelo Carlson is a 63 y.o. female who presents for annual exam.  Non-smoker.  Mammogram today.  DEXA scan 2021 showed osteopenia.  Patient would like to schedule another 1.  Colonoscopy due again in .  Previous hysterectomy with removal of 1 ovary.  Uses clobetasol occasionally for chronic vulvitis.  No other issues.  No stress incontinence.    Obstetric History:  OB History        2    Para   2    Term   2            AB        Living           SAB        IAB        Ectopic        Molar        Multiple        Live Births                   Menstrual History:     No LMP recorded. Patient has had a hysterectomy.       Past Medical History:   Diagnosis Date   • Anxiety and depression    • Arthritis    • DDD (degenerative disc disease), cervical    • Diabetes mellitus    • Hypertension    • Macular degeneration     RIGHT EYE   • Seasonal allergies    • Spinal stenosis    • Tachycardia      Family History   Problem Relation Age of Onset   • Diabetes Mother    • Diabetes Sister    • Diabetes Paternal Grandmother    • Breast cancer Maternal Aunt      Social History     Tobacco Use   Smoking Status Never   Smokeless Tobacco Never         The following portions of the patient's history were reviewed and updated as appropriate: allergies, current medications, past family history, past medical history, past social history, past surgical history and problem list.    Review of Systems   Constitutional: Negative.  Negative for fever and unexpected weight change.   HENT: Negative.    Respiratory: Negative for shortness of breath and wheezing.    Cardiovascular: Negative for chest pain, palpitations and leg swelling.   Gastrointestinal: Negative for abdominal pain, anal bleeding and blood in stool.   Genitourinary: Negative for dysuria, pelvic pain, urgency, vaginal  bleeding, vaginal discharge and vaginal pain.   Skin: Negative.    Neurological: Negative.    Hematological: Negative.  Negative for adenopathy.   Psychiatric/Behavioral: Negative.  Negative for dysphoric mood. The patient is not nervous/anxious.             Objective   Physical Exam  Vitals reviewed. Exam conducted with a chaperone present.   Constitutional:       Appearance: She is well-developed.   HENT:      Head: Normocephalic.   Eyes:      Pupils: Pupils are equal, round, and reactive to light.   Neck:      Thyroid: No thyromegaly.      Trachea: No tracheal deviation.   Cardiovascular:      Rate and Rhythm: Normal rate and regular rhythm.      Heart sounds: Normal heart sounds. No murmur heard.  Pulmonary:      Effort: Pulmonary effort is normal. No respiratory distress.      Breath sounds: Normal breath sounds.   Chest:   Breasts:     Right: Normal. No mass, nipple discharge or tenderness.      Left: Normal. No mass, nipple discharge or tenderness.   Abdominal:      Palpations: Abdomen is soft. There is no mass.      Tenderness: There is no abdominal tenderness.      Hernia: No hernia is present.   Genitourinary:     General: Normal vulva.      Labia:         Right: No tenderness or lesion.         Left: No tenderness or lesion.       Urethra: No prolapse or urethral lesion.      Vagina: Normal. No vaginal discharge.      Uterus: Absent.       Adnexa:         Right: No fullness.          Left: No fullness.        Rectum: Normal. No external hemorrhoid or internal hemorrhoid. Normal anal tone.      Comments: External genitalia normal.  Vulvitis doing well on exam  Lymphadenopathy:      Cervical: No cervical adenopathy.      Upper Body:      Right upper body: No axillary adenopathy.      Left upper body: No axillary adenopathy.   Skin:     General: Skin is warm and dry.      Findings: No rash.   Neurological:      Mental Status: She is alert and oriented to person, place, and time.   Psychiatric:          "Behavior: Behavior normal.         /74   Ht 162.6 cm (64\")   Wt 62.6 kg (138 lb)   BMI 23.69 kg/m²     Assessment & Plan   Diagnoses and all orders for this visit:    1. Encounter for gynecological examination with abnormal finding (Primary)  -     IGP,rfx Aptima HPV All Pth    2. Chronic vulvitis    3. Osteopenia, unspecified location  -     DEXA Bone Density Axial; Future    4. Other specified disorders of bone density and structure, other site  -     DEXA Bone Density Axial; Future        Mammogram.  Return to office 1 to 2 years.  Colorectal screening up-to-date.  Counseled about continuing calcium with vitamin D for osteoporosis prevention.  We will get another DEXA scan.             "

## 2023-05-15 ENCOUNTER — OFFICE VISIT (OUTPATIENT)
Dept: FAMILY MEDICINE CLINIC | Facility: CLINIC | Age: 63
End: 2023-05-15

## 2023-05-15 VITALS
TEMPERATURE: 98.9 F | RESPIRATION RATE: 14 BRPM | BODY MASS INDEX: 24.09 KG/M2 | HEART RATE: 76 BPM | OXYGEN SATURATION: 99 % | HEIGHT: 64 IN | SYSTOLIC BLOOD PRESSURE: 112 MMHG | DIASTOLIC BLOOD PRESSURE: 64 MMHG | WEIGHT: 141.1 LBS

## 2023-05-15 DIAGNOSIS — Z00.00 ENCOUNTER FOR SUBSEQUENT ANNUAL WELLNESS VISIT (AWV) IN MEDICARE PATIENT: Primary | ICD-10-CM

## 2023-05-15 DIAGNOSIS — E11.42 DIABETIC POLYNEUROPATHY ASSOCIATED WITH TYPE 2 DIABETES MELLITUS: ICD-10-CM

## 2023-05-15 DIAGNOSIS — F51.01 PRIMARY INSOMNIA: ICD-10-CM

## 2023-05-15 DIAGNOSIS — Z11.59 NEED FOR HEPATITIS C SCREENING TEST: ICD-10-CM

## 2023-05-15 DIAGNOSIS — F32.5 MAJOR DEPRESSIVE DISORDER IN FULL REMISSION, UNSPECIFIED WHETHER RECURRENT: ICD-10-CM

## 2023-05-15 DIAGNOSIS — E11.9 TYPE 2 DIABETES MELLITUS WITHOUT COMPLICATION, WITHOUT LONG-TERM CURRENT USE OF INSULIN: ICD-10-CM

## 2023-05-15 DIAGNOSIS — I10 PRIMARY HYPERTENSION: ICD-10-CM

## 2023-05-15 NOTE — PROGRESS NOTES
The ABCs of the Annual Wellness Visit  Subsequent Medicare Wellness Visit    Subjective    Consuelo Carlson is a 63 y.o. female who presents for a Subsequent Medicare Wellness Visit.    The following portions of the patient's history were reviewed and   updated as appropriate: allergies, current medications, past family history, past medical history, past social history, past surgical history and problem list.    Compared to one year ago, the patient feels her physical   health is the same.    Compared to one year ago, the patient feels her mental   health is the same.    Recent Hospitalizations:  She was not admitted to the hospital during the last year.       Current Medical Providers:  Patient Care Team:  Paul Choi DO as PCP - General (Family Medicine)  Harini Sood APRN as Nurse Practitioner (Endocrinology)    Outpatient Medications Prior to Visit   Medication Sig Dispense Refill   • aspirin 81 MG EC tablet Take 1 tablet by mouth.     • atorvastatin (LIPITOR) 40 MG tablet TAKE 1 TABLET BY MOUTH AT BEDTIME 90 tablet 2   • clobetasol (TEMOVATE) 0.05 % cream Apply  topically to the appropriate area as directed Every Other Day. 30 g 2   • Continuous Blood Gluc Sensor (FreeStyle Wilma 3 Sensor) misc 1 each Every 14 (Fourteen) Days. 2 each 5   • Diclofenac Sodium (VOLTAREN) 1 % gel gel      • gabapentin (NEURONTIN) 600 MG tablet TAKE 1 TABLET BY MOUTH THREE TIMES DAILY AS DIRECTED FOR PAIN (MAY CAUSE DROWSINESS)     • lidocaine (LIDODERM) 5 % Every 12 (Twelve) Hours.     • losartan (COZAAR) 25 MG tablet Take 1 tablet by mouth Daily. 90 tablet 3   • metFORMIN ER (GLUCOPHAGE-XR) 500 MG 24 hr tablet Take 2 tablets by mouth 2 (Two) Times a Day With Meals. 360 tablet 1   • metoprolol succinate XL (TOPROL-XL) 50 MG 24 hr tablet Take 1 tablet by mouth Daily. for blood pressure 90 tablet 3   • Multiple Vitamins-Minerals (EYE VITAMINS PO) Take 1 tablet by mouth Daily. HOLD PRIOR TO SURGERY     • temazepam  "(RESTORIL) 30 MG capsule TAKE 1 CAPSULE BY MOUTH AT BEDTIME AS NEEDED 30 capsule 4   • venlafaxine XR (EFFEXOR-XR) 150 MG 24 hr capsule Take 1 capsule by mouth Daily. 90 capsule 3     No facility-administered medications prior to visit.       No opioid medication identified on active medication list. I have reviewed chart for other potential  high risk medication/s and harmful drug interactions in the elderly.          Aspirin is on active medication list. Aspirin use is indicated based on review of current medical condition/s. Pros and cons of this therapy have been discussed today. Benefits of this medication outweigh potential harm.  Patient has been encouraged to continue taking this medication.  .      Patient Active Problem List   Diagnosis   • Macromastia   • Anxiety   • Depression   • Gastroesophageal reflux disease   • HTN (hypertension)   • Encounter for subsequent annual wellness visit (AWV) in Medicare patient   • Insomnia   • Low back pain, episodic   • Degeneration of lumbar intervertebral disc   • Lumbosacral radiculopathy at L4   • Macular degeneration   • Neuropathy   • S/P lumbar discectomy   • T2DM (type 2 diabetes mellitus)   • Tachycardia   • Mixed hyperlipidemia   • Diabetic polyneuropathy associated with type 2 diabetes mellitus   • Nocturnal hypoglycemia   • Hyperglycemia due to diabetes mellitus     Advance Care Planning   Advance Care Planning     Advance Directive is not on file.  ACP discussion was declined by the patient. Patient has an advance directive (not in EMR), copy requested.     Objective    Vitals:    05/15/23 1328   BP: 112/64   Pulse: 76   Resp: 14   Temp: 98.9 °F (37.2 °C)   SpO2: 99%   Weight: 64 kg (141 lb 1.6 oz)   Height: 162.6 cm (64\")   PainSc: 0-No pain     Estimated body mass index is 24.22 kg/m² as calculated from the following:    Height as of this encounter: 162.6 cm (64\").    Weight as of this encounter: 64 kg (141 lb 1.6 oz).    BMI is within normal parameters. " No other follow-up for BMI required.      Does the patient have evidence of cognitive impairment? No    Lab Results   Component Value Date    HGBA1C 6.6 (A) 2023        HEALTH RISK ASSESSMENT    Smoking Status:  Social History     Tobacco Use   Smoking Status Never   Smokeless Tobacco Never     Alcohol Consumption:  Social History     Substance and Sexual Activity   Alcohol Use No     Fall Risk Screen:    COCOCARINA Fall Risk Assessment was completed, and patient is at LOW risk for falls.Assessment completed on:5/15/2023    Depression Screenin/15/2023     1:32 PM   PHQ-2/PHQ-9 Depression Screening   Little Interest or Pleasure in Doing Things 0-->not at all   Feeling Down, Depressed or Hopeless 0-->not at all   PHQ-9: Brief Depression Severity Measure Score 0       Health Habits and Functional and Cognitive Screenin/15/2023     1:00 PM   Functional & Cognitive Status   Do you have difficulty preparing food and eating? No   Do you have difficulty bathing yourself, getting dressed or grooming yourself? No   Do you have difficulty using the toilet? No   Do you have difficulty moving around from place to place? No   Do you have trouble with steps or getting out of a bed or a chair? No   Current Diet Limited Junk Food   Dental Exam Not up to date   Eye Exam Up to date   Exercise (times per week) 5 times per week   Current Exercises Include Walking   Do you need help using the phone?  No   Are you deaf or do you have serious difficulty hearing?  Yes   Do you need help with transportation? No   Do you need help shopping? No   Do you need help preparing meals?  No   Do you need help with housework?  No   Do you need help with laundry? No   Do you need help taking your medications? No   Do you need help managing money? No   Do you ever drive or ride in a car without wearing a seat belt? No   Have you felt unusual stress, anger or loneliness in the last month? No   Who do you live with? Spouse   If you  need help, do you have trouble finding someone available to you? No   Have you been bothered in the last four weeks by sexual problems? No   Do you have difficulty concentrating, remembering or making decisions? No       Age-appropriate Screening Schedule:  Refer to the list below for future screening recommendations based on patient's age, sex and/or medical conditions. Orders for these recommended tests are listed in the plan section. The patient has been provided with a written plan.    Health Maintenance   Topic Date Due   • HEPATITIS C SCREENING  Never done   • URINE MICROALBUMIN  12/09/2022   • DXA SCAN  01/26/2023   • LIPID PANEL  05/24/2023   • COVID-19 Vaccine (4 - Booster for Moderna series) 08/24/2023 (Originally 3/30/2022)   • TDAP/TD VACCINES (1 - Tdap) 08/24/2023 (Originally 4/4/1979)   • Pneumococcal Vaccine 65+ (1 - PCV) 05/15/2024 (Originally 4/4/1966)   • ZOSTER VACCINE (1 of 2) 08/15/2024 (Originally 4/4/2008)   • DIABETIC EYE EXAM  06/29/2023   • INFLUENZA VACCINE  08/01/2023   • DIABETIC FOOT EXAM  09/06/2023   • HEMOGLOBIN A1C  09/20/2023   • ANNUAL WELLNESS VISIT  05/15/2024   • MAMMOGRAM  05/08/2025   • COLORECTAL CANCER SCREENING  01/17/2026   • PAP SMEAR  05/08/2026                  CMS Preventative Services Quick Reference  Risk Factors Identified During Encounter  reviewed  The above risks/problems have been discussed with the patient.  Pertinent information has been shared with the patient in the After Visit Summary.  An After Visit Summary and PPPS were made available to the patient.    Follow Up:   Next Medicare Wellness visit to be scheduled in 1 year.       Additional E&M Note during same encounter follows:  Patient has multiple medical problems which are significant and separately identifiable that require additional work above and beyond the Medicare Wellness Visit.      Chief Complaint  Medicare Wellness-subsequent    Subjective        HPI  Consuelo Carlson is also being seen  "today for AWV          Objective   Vital Signs:  /64   Pulse 76   Temp 98.9 °F (37.2 °C)   Resp 14   Ht 162.6 cm (64\")   Wt 64 kg (141 lb 1.6 oz)   SpO2 99%   BMI 24.22 kg/m²     Physical Exam  Vitals reviewed.   Constitutional:       Appearance: Normal appearance. She is well-developed.   HENT:      Head: Normocephalic and atraumatic.      Right Ear: External ear normal.      Left Ear: External ear normal.      Nose: Nose normal.   Eyes:      Conjunctiva/sclera: Conjunctivae normal.      Pupils: Pupils are equal, round, and reactive to light.   Cardiovascular:      Rate and Rhythm: Normal rate.   Pulmonary:      Effort: Pulmonary effort is normal.      Breath sounds: Normal breath sounds.   Abdominal:      General: There is no distension.   Skin:     General: Skin is warm and dry.   Neurological:      Mental Status: She is alert and oriented to person, place, and time. Mental status is at baseline.   Psychiatric:         Mood and Affect: Mood and affect normal.         Behavior: Behavior normal.         Thought Content: Thought content normal.         Judgment: Judgment normal.          The following data was reviewed by: Paul Choi DO on 05/15/2023:  Common labs        12/19/2022    13:17 3/20/2023    14:06   Common Labs   Hemoglobin A1C 7.2   6.6                  Assessment and Plan   Diagnoses and all orders for this visit:    1. Encounter for subsequent annual wellness visit (AWV) in Medicare patient (Primary)    2. Type 2 diabetes mellitus without complication, without long-term current use of insulin  -     Microalbumin / Creatinine Urine Ratio - Urine, Clean Catch; Future    3. Need for hepatitis C screening test  -     Hepatitis C antibody; Future    4. Primary hypertension    5. Major depressive disorder in full remission, unspecified whether recurrent    6. Primary insomnia    7. Diabetic polyneuropathy associated with type 2 diabetes mellitus      Reviewed AWV recommendations and ordered " as appropriate, follow up annually for review, sooner if concerns    No depression, falls, dementia symptoms or other  Risk and home safety assessment good    Followed up on chronic conditions and ordered refills, appropriate monitoring labs additionally as needed every 6 months or sooner if indicated, controlled stable    Follow up at least every 3-6 months for chronic conditions and as scheduled with appropriate specialists as indicated otherwise             Follow Up   Return in about 6 months (around 11/15/2023), or if symptoms worsen or fail to improve, for Labs before.  Patient was given instructions and counseling regarding her condition or for health maintenance advice. Please see specific information pulled into the AVS if appropriate.

## 2023-06-01 PROBLEM — Z00.00 ENCOUNTER FOR SUBSEQUENT ANNUAL WELLNESS VISIT (AWV) IN MEDICARE PATIENT: Status: ACTIVE | Noted: 2021-11-27

## 2023-06-11 NOTE — PROGRESS NOTES
Chief Complaint  Diabetes (Follow up, med mgt, A1c eval, )    Referred By: DO Orlando Riley          Consuelo Carlson presents to [facility] for diabetes medication management    History of Present Illness    Visit type:  follow-up  Diabetes type:  Type 2  Current diabetes status/concerns/issues:  She feels her glucose levels are doing well  Other health concerns: none  Current Diabetes symptoms:    Polyuria: No   Polydipsia: Yes,     Polyphagia: Yes,     Blurred vision: No   Excessive fatigue: No  Known Diabetes complications:  Neuropathy: None; Location: N/A  Renal: None  Eyes: Other: Patient reports macular degeneration but there is no eye exam available ; Location: Right  Amputation/Wounds: None  GI: None  Cardiovascular: Hypertension and Other: Tachycardia  ED: N/A  Other: None  Hypoglycemia:  None reported at this time  Hypoglycemia Symptoms:  No hypoglycemia at this time  Current diabetes treatment:  Metformin ER 1000 mg twice a day   Blood glucose device:  Does Not Test  Blood glucose monitoring frequency:  None  Blood glucose range/average:  unknown     Glucose Source: N/A  Diet:  Limits high carb/sweet foods, Avoids sugary drinks  Activity/Exercise:   she stays active within the limits of her back issues    Past Medical History:   Diagnosis Date    Anxiety and depression     Arthritis     DDD (degenerative disc disease), cervical     Diabetes mellitus     Hypertension     Macular degeneration     RIGHT EYE    Seasonal allergies     Spinal stenosis     Tachycardia      Past Surgical History:   Procedure Laterality Date    ANTERIOR CERVICAL DISCECTOMY W/ FUSION      AND INSTRUMENTATION    BACK SURGERY      BILATERAL BREAST REDUCTION Bilateral 2017    Procedure: BREAST REDUCTION BILATERAL;  Surgeon: Jorge Lebron MD;  Location: [facility];  Service:      SECTION      COLONOSCOPY      HYSTERECTOMY      KNEE SURGERY Right     TONSILLECTOMY       TUBAL ABDOMINAL LIGATION       Family History   Problem Relation Age of Onset    Diabetes Mother     Diabetes Sister     Diabetes Paternal Grandmother     Breast cancer Maternal Aunt      Social History     Socioeconomic History    Marital status:     Number of children: 2   Tobacco Use    Smoking status: Never    Smokeless tobacco: Never   Vaping Use    Vaping Use: Never used   Substance and Sexual Activity    Alcohol use: No    Drug use: No    Sexual activity: Yes     Partners: Male     No Known Allergies    Current Outpatient Medications:     aspirin 81 MG EC tablet, Take 1 tablet by mouth., Disp: , Rfl:     atorvastatin (LIPITOR) 40 MG tablet, TAKE 1 TABLET BY MOUTH AT BEDTIME, Disp: 90 tablet, Rfl: 2    clobetasol (TEMOVATE) 0.05 % cream, Apply  topically to the appropriate area as directed Every Other Day., Disp: 30 g, Rfl: 2    Diclofenac Sodium (VOLTAREN) 1 % gel gel, , Disp: , Rfl:     gabapentin (NEURONTIN) 600 MG tablet, TAKE 1 TABLET BY MOUTH THREE TIMES DAILY AS DIRECTED FOR PAIN (MAY CAUSE DROWSINESS), Disp: , Rfl:     lidocaine (LIDODERM) 5 %, Every 12 (Twelve) Hours., Disp: , Rfl:     losartan (COZAAR) 25 MG tablet, Take 1 tablet by mouth Daily., Disp: 90 tablet, Rfl: 3    metFORMIN ER (GLUCOPHAGE-XR) 500 MG 24 hr tablet, Take 2 tablets by mouth 2 (Two) Times a Day With Meals., Disp: 360 tablet, Rfl: 1    metoprolol succinate XL (TOPROL-XL) 50 MG 24 hr tablet, Take 1 tablet by mouth Daily. for blood pressure, Disp: 90 tablet, Rfl: 3    Multiple Vitamins-Minerals (EYE VITAMINS PO), Take 1 tablet by mouth Daily. HOLD PRIOR TO SURGERY, Disp: , Rfl:     temazepam (RESTORIL) 30 MG capsule, TAKE 1 CAPSULE BY MOUTH AT BEDTIME AS NEEDED, Disp: 30 capsule, Rfl: 4    venlafaxine XR (EFFEXOR-XR) 150 MG 24 hr capsule, Take 1 capsule by mouth Daily., Disp: 90 capsule, Rfl: 3    Continuous Blood Gluc Sensor (FreeStyle Wilma 3 Sensor) misc, 1 each Every 14 (Fourteen) Days. (Patient not taking: Reported on  "6/12/2023), Disp: 2 each, Rfl: 5    Objective     Vitals:    06/12/23 0844   BP: 133/72   BP Location: Left arm   Patient Position: Sitting   Cuff Size: Adult   Pulse: 79   Temp: 97.2 °F (36.2 °C)   SpO2: 97%   Weight: 63 kg (139 lb)   Height: 162.6 cm (64\")   PainSc:   7     Body mass index is 23.86 kg/m².    Physical Exam  Constitutional:       Appearance: Normal appearance. She is normal weight.   HENT:      Head: Normocephalic and atraumatic.      Right Ear: External ear normal.      Left Ear: External ear normal.      Nose: Nose normal.   Eyes:      Extraocular Movements: Extraocular movements intact.      Conjunctiva/sclera: Conjunctivae normal.   Pulmonary:      Effort: Pulmonary effort is normal.   Musculoskeletal:         General: Normal range of motion.      Cervical back: Normal range of motion.   Skin:     General: Skin is warm and dry.   Neurological:      General: No focal deficit present.      Mental Status: She is alert and oriented to person, place, and time. Mental status is at baseline.   Psychiatric:         Mood and Affect: Mood normal.         Behavior: Behavior normal.         Thought Content: Thought content normal.         Judgment: Judgment normal.           Result Review :   The following data was reviewed by: ROBI Ma on 06/12/2023:    Most Recent A1C          6/12/2023    08:51   HGBA1C Most Recent   Hemoglobin A1C 6.9        A1C Last 3 Results          12/19/2022    13:17 3/20/2023    14:06 6/12/2023    08:51   HGBA1C Last 3 Results   Hemoglobin A1C 7.2  6.6  6.9      Point of care A1c in the office today is 6.9% indicating controlled type 2 diabetes.  This is up slightly from the prior result collected in March    Glucose   Date Value Ref Range Status   06/12/2023 154 (H) 70 - 99 mg/dL Final     Comment:     Serial Number: [id number]:  [id number]     Point-of-care glucose in the office today is within normal limits for nonfasting glucose            Assessment: " The patient has had some increase in her A1c but remains in a well-controlled status.  Unfortunately, we are unable to get the wendy continuous glucose sensor approved for the patient as she does not meet Medicare modification.  Patient does not monitor her glucose levels at this time.      Diagnoses and all orders for this visit:    1. Controlled type 2 diabetes mellitus without complication, without long-term current use of insulin (Primary)  -     POC Glycosylated Hemoglobin (Hb A1C)  -     POC Glucose  -     metFORMIN ER (GLUCOPHAGE-XR) 500 MG 24 hr tablet; Take 2 tablets by mouth 2 (Two) Times a Day With Meals.  Dispense: 360 tablet; Refill: 1        Plan: No changes were made to the patient's treatment plan today.  She is encouraged to monitor diet and physical activity to help maintain her current weight and to promote glucose control.    If she develops problematic hyperglycemia or hypoglycemia or adverse drug reactions, she will contact the office for further instructions.        Follow Up     Return in about 6 months (around 12/12/2023) for Medication Management.    Patient was given instructions and counseling regarding her condition or for health maintenance advice. Please see specific information pulled into the AVS if appropriate.     Harini Sood, ROBI  06/12/2023      Dictated Utilizing Dragon Dictation.  Please note that portions of this note were completed with a voice recognition program.  Part of this note may be an electronic transcription/translation of spoken language to printed text using the Dragon Dictation System.

## 2023-06-12 ENCOUNTER — OFFICE VISIT (OUTPATIENT)
Dept: DIABETES SERVICES | Facility: HOSPITAL | Age: 63
End: 2023-06-12
Payer: MEDICARE

## 2023-06-12 VITALS
BODY MASS INDEX: 23.73 KG/M2 | TEMPERATURE: 97.2 F | DIASTOLIC BLOOD PRESSURE: 72 MMHG | HEART RATE: 79 BPM | WEIGHT: 139 LBS | HEIGHT: 64 IN | SYSTOLIC BLOOD PRESSURE: 133 MMHG | OXYGEN SATURATION: 97 %

## 2023-06-12 DIAGNOSIS — E11.9 CONTROLLED TYPE 2 DIABETES MELLITUS WITHOUT COMPLICATION, WITHOUT LONG-TERM CURRENT USE OF INSULIN: Primary | ICD-10-CM

## 2023-06-12 LAB
EXPIRATION DATE: ABNORMAL
GLUCOSE BLDC GLUCOMTR-MCNC: 154 MG/DL (ref 70–99)
HBA1C MFR BLD: 6.9 %
Lab: ABNORMAL

## 2023-06-12 PROCEDURE — 3044F HG A1C LEVEL LT 7.0%: CPT | Performed by: NURSE PRACTITIONER

## 2023-06-12 PROCEDURE — G0463 HOSPITAL OUTPT CLINIC VISIT: HCPCS | Performed by: NURSE PRACTITIONER

## 2023-06-12 PROCEDURE — 82948 REAGENT STRIP/BLOOD GLUCOSE: CPT | Performed by: NURSE PRACTITIONER

## 2023-06-12 PROCEDURE — 3078F DIAST BP <80 MM HG: CPT | Performed by: NURSE PRACTITIONER

## 2023-06-12 PROCEDURE — 3075F SYST BP GE 130 - 139MM HG: CPT | Performed by: NURSE PRACTITIONER

## 2023-06-12 PROCEDURE — 1159F MED LIST DOCD IN RCRD: CPT | Performed by: NURSE PRACTITIONER

## 2023-06-12 PROCEDURE — 99212 OFFICE O/P EST SF 10 MIN: CPT | Performed by: NURSE PRACTITIONER

## 2023-06-12 PROCEDURE — 1160F RVW MEDS BY RX/DR IN RCRD: CPT | Performed by: NURSE PRACTITIONER

## 2023-06-12 RX ORDER — METFORMIN HYDROCHLORIDE 500 MG/1
1000 TABLET, EXTENDED RELEASE ORAL 2 TIMES DAILY WITH MEALS
Qty: 360 TABLET | Refills: 1 | Status: SHIPPED | OUTPATIENT
Start: 2023-06-12

## 2023-06-14 ENCOUNTER — LAB (OUTPATIENT)
Dept: LAB | Facility: HOSPITAL | Age: 63
End: 2023-06-14
Payer: MEDICARE

## 2023-06-14 DIAGNOSIS — Z11.59 NEED FOR HEPATITIS C SCREENING TEST: ICD-10-CM

## 2023-06-14 DIAGNOSIS — E11.65 TYPE 2 DIABETES MELLITUS WITH HYPERGLYCEMIA, WITHOUT LONG-TERM CURRENT USE OF INSULIN: Chronic | ICD-10-CM

## 2023-06-14 DIAGNOSIS — E11.9 TYPE 2 DIABETES MELLITUS WITHOUT COMPLICATION, WITHOUT LONG-TERM CURRENT USE OF INSULIN: ICD-10-CM

## 2023-06-14 DIAGNOSIS — E78.2 MIXED HYPERLIPIDEMIA: Chronic | ICD-10-CM

## 2023-06-14 LAB
ALBUMIN SERPL-MCNC: 4.4 G/DL (ref 3.5–5.2)
ALBUMIN UR-MCNC: <1.2 MG/DL
ALBUMIN/GLOB SERPL: 2.3 G/DL
ALP SERPL-CCNC: 79 U/L (ref 39–117)
ALT SERPL W P-5'-P-CCNC: 20 U/L (ref 1–33)
ANION GAP SERPL CALCULATED.3IONS-SCNC: 11 MMOL/L (ref 5–15)
AST SERPL-CCNC: 20 U/L (ref 1–32)
BILIRUB SERPL-MCNC: 0.5 MG/DL (ref 0–1.2)
BUN SERPL-MCNC: 14 MG/DL (ref 8–23)
BUN/CREAT SERPL: 21.5 (ref 7–25)
CALCIUM SPEC-SCNC: 8.8 MG/DL (ref 8.6–10.5)
CHLORIDE SERPL-SCNC: 106 MMOL/L (ref 98–107)
CHOLEST SERPL-MCNC: 116 MG/DL (ref 0–200)
CO2 SERPL-SCNC: 25 MMOL/L (ref 22–29)
CREAT SERPL-MCNC: 0.65 MG/DL (ref 0.57–1)
CREAT UR-MCNC: 98.4 MG/DL
DEPRECATED RDW RBC AUTO: 42.8 FL (ref 37–54)
EGFRCR SERPLBLD CKD-EPI 2021: 97.8 ML/MIN/1.73
ERYTHROCYTE [DISTWIDTH] IN BLOOD BY AUTOMATED COUNT: 13.2 % (ref 12.3–15.4)
GLOBULIN UR ELPH-MCNC: 1.9 GM/DL
GLUCOSE SERPL-MCNC: 200 MG/DL (ref 65–99)
HBA1C MFR BLD: 6.8 % (ref 4.8–5.6)
HCT VFR BLD AUTO: 39.6 % (ref 34–46.6)
HCV AB SER DONR QL: NORMAL
HDLC SERPL-MCNC: 34 MG/DL (ref 40–60)
HGB BLD-MCNC: 13.3 G/DL (ref 12–15.9)
LDLC SERPL CALC-MCNC: 38 MG/DL (ref 0–100)
LDLC/HDLC SERPL: 0.68 {RATIO}
MCH RBC QN AUTO: 29.6 PG (ref 26.6–33)
MCHC RBC AUTO-ENTMCNC: 33.6 G/DL (ref 31.5–35.7)
MCV RBC AUTO: 88 FL (ref 79–97)
MICROALBUMIN/CREAT UR: NORMAL MG/G{CREAT}
PLATELET # BLD AUTO: 143 10*3/MM3 (ref 140–450)
PMV BLD AUTO: 11.2 FL (ref 6–12)
POTASSIUM SERPL-SCNC: 4.4 MMOL/L (ref 3.5–5.2)
PROT SERPL-MCNC: 6.3 G/DL (ref 6–8.5)
RBC # BLD AUTO: 4.5 10*6/MM3 (ref 3.77–5.28)
SODIUM SERPL-SCNC: 142 MMOL/L (ref 136–145)
T4 FREE SERPL-MCNC: 1.29 NG/DL (ref 0.93–1.7)
TRIGL SERPL-MCNC: 295 MG/DL (ref 0–150)
TSH SERPL DL<=0.05 MIU/L-ACNC: 2.02 UIU/ML (ref 0.27–4.2)
VLDLC SERPL-MCNC: 44 MG/DL (ref 5–40)
WBC NRBC COR # BLD: 3.65 10*3/MM3 (ref 3.4–10.8)

## 2023-06-14 PROCEDURE — 36415 COLL VENOUS BLD VENIPUNCTURE: CPT

## 2023-06-14 PROCEDURE — 84443 ASSAY THYROID STIM HORMONE: CPT

## 2023-06-14 PROCEDURE — 80061 LIPID PANEL: CPT

## 2023-06-14 PROCEDURE — 80053 COMPREHEN METABOLIC PANEL: CPT

## 2023-06-14 PROCEDURE — 82570 ASSAY OF URINE CREATININE: CPT

## 2023-06-14 PROCEDURE — 82043 UR ALBUMIN QUANTITATIVE: CPT

## 2023-06-14 PROCEDURE — 84439 ASSAY OF FREE THYROXINE: CPT

## 2023-06-14 PROCEDURE — 86803 HEPATITIS C AB TEST: CPT

## 2023-06-14 PROCEDURE — 83036 HEMOGLOBIN GLYCOSYLATED A1C: CPT

## 2023-06-14 PROCEDURE — 85027 COMPLETE CBC AUTOMATED: CPT

## 2023-07-24 DIAGNOSIS — F51.01 PRIMARY INSOMNIA: Chronic | ICD-10-CM

## 2023-07-24 NOTE — TELEPHONE ENCOUNTER
Caller: Consuelo Carlson    Relationship: Self    Best call back number: [phone]     Requested Prescriptions:   Requested Prescriptions     Pending Prescriptions Disp Refills    temazepam (RESTORIL) 30 MG capsule 30 capsule 4     Sig: Take 1 capsule by mouth At Night As Needed.        Pharmacy where request should be sent: [address] [phone]  - [phone] FX     Last office visit with prescribing clinician: 5/15/2023   Last telemedicine visit with prescribing clinician: Visit date not found   Next office visit with prescribing clinician: Visit date not found     Does the patient have less than a 3 day supply:  [] Yes  [x] No    Would you like a call back once the refill request has been completed: [x] Yes [] No    If the office needs to give you a call back, can they leave a voicemail: [x] Yes [] No    Kimberly Navarro, PCT   07/24/23 10:12 EDT

## 2023-07-25 RX ORDER — TEMAZEPAM 30 MG/1
30 CAPSULE ORAL NIGHTLY PRN
Qty: 30 CAPSULE | Refills: 2 | Status: SHIPPED | OUTPATIENT
Start: 2023-07-25

## 2023-07-25 NOTE — TELEPHONE ENCOUNTER
Last office visit 05/2023, UDS AND CONSENT on file 09/2022  No follow up scheduled but due around November according to last office note

## 2023-07-31 ENCOUNTER — HOSPITAL ENCOUNTER (OUTPATIENT)
Dept: PET IMAGING | Facility: HOSPITAL | Age: 63
Discharge: HOME | End: 2023-07-31
Admitting: OBSTETRICS & GYNECOLOGY
Payer: MEDICARE

## 2023-07-31 DIAGNOSIS — M85.88 OTHER SPECIFIED DISORDERS OF BONE DENSITY AND STRUCTURE, OTHER SITE: ICD-10-CM

## 2023-07-31 DIAGNOSIS — M85.80 OSTEOPENIA, UNSPECIFIED LOCATION: ICD-10-CM

## 2023-07-31 PROCEDURE — 77080 DXA BONE DENSITY AXIAL: CPT

## 2023-07-31 PROCEDURE — 77080 DXA BONE DENSITY AXIAL: CPT | Performed by: RADIOLOGY

## 2023-09-12 ENCOUNTER — CLINICAL SUPPORT (OUTPATIENT)
Dept: FAMILY MEDICINE CLINIC | Facility: CLINIC | Age: 63
End: 2023-09-12
Payer: MEDICARE

## 2023-09-12 DIAGNOSIS — J30.9 ALLERGIC RHINITIS, UNSPECIFIED SEASONALITY, UNSPECIFIED TRIGGER: Primary | ICD-10-CM

## 2023-09-12 PROCEDURE — 95117 IMMUNOTHERAPY INJECTIONS: CPT | Performed by: NURSE PRACTITIONER

## 2023-09-12 NOTE — PROGRESS NOTES

## 2023-09-14 ENCOUNTER — CLINICAL SUPPORT (OUTPATIENT)
Dept: FAMILY MEDICINE CLINIC | Facility: CLINIC | Age: 63
End: 2023-09-14
Payer: MEDICARE

## 2023-09-14 DIAGNOSIS — J30.9 ALLERGIC RHINITIS, UNSPECIFIED SEASONALITY, UNSPECIFIED TRIGGER: Primary | ICD-10-CM

## 2023-09-14 PROCEDURE — 95117 IMMUNOTHERAPY INJECTIONS: CPT | Performed by: FAMILY MEDICINE

## 2023-09-14 NOTE — PROGRESS NOTES
..  History of Present Illness    Immunotherapy Pre-Injection Questionnaire:         Are you currently pregnant or been diagnosed with a new medical condition? No    1. Have you had increased asthmas symptoms (chest tightness, increased cough, wheezing, or shortness of breath) in the past week? No    2. Have you had increase allergy symptoms (itching eyes or nose, sneezing, runny nose, post-nasal drip, or throat-clearing) in the past week? No    3. Have you had a cold, respiratory tract infection, or flu-like symptoms in the past two weeks? No    4. Did you have any problems such as increased allergy or asthma symptoms, hives, or generalized itching within 12 hours or receiving your last injection? No    5. Are you on any new medications? New eye drops? No    6. Patient confirmed their name and birth date on allergy vials are correct. Yes     Assessment/Plan   Problems Addressed this Visit    None  Visit Diagnoses       Allergic rhinitis, unspecified seasonality, unspecified trigger    -  Primary    Relevant Medications    patient supplied allergy injection (Start on 9/14/2023  4:00 PM)    patient supplied allergy injection (Start on 9/14/2023  4:00 PM)          Diagnoses         Codes Comments    Allergic rhinitis, unspecified seasonality, unspecified trigger    -  Primary ICD-10-CM: J30.9  ICD-9-CM: 477.9           Diagnoses and all orders for this visit                Clemente Gaffney 9/14/2023

## 2023-09-21 ENCOUNTER — CLINICAL SUPPORT (OUTPATIENT)
Dept: FAMILY MEDICINE CLINIC | Facility: CLINIC | Age: 63
End: 2023-09-21
Payer: MEDICARE

## 2023-09-21 DIAGNOSIS — J30.9 ALLERGIC RHINITIS, UNSPECIFIED SEASONALITY, UNSPECIFIED TRIGGER: Primary | ICD-10-CM

## 2023-09-21 PROCEDURE — 95117 IMMUNOTHERAPY INJECTIONS: CPT | Performed by: FAMILY MEDICINE

## 2023-09-21 NOTE — PROGRESS NOTES
..  History of Present Illness    Immunotherapy Pre-Injection Questionnaire:         Are you currently pregnant or been diagnosed with a new medical condition? No    1. Have you had increased asthmas symptoms (chest tightness, increased cough, wheezing, or shortness of breath) in the past week? No    2. Have you had increase allergy symptoms (itching eyes or nose, sneezing, runny nose, post-nasal drip, or throat-clearing) in the past week? No    3. Have you had a cold, respiratory tract infection, or flu-like symptoms in the past two weeks? No    4. Did you have any problems such as increased allergy or asthma symptoms, hives, or generalized itching within 12 hours or receiving your last injection? No    5. Are you on any new medications? New eye drops? No    6. Patient confirmed their name and birth date on allergy vials are correct. Yes     Assessment/Plan   Problems Addressed this Visit    None  Diagnoses    None.       Diagnoses and all orders for this visit                Clemente Gaffney 9/21/2023

## 2023-09-25 ENCOUNTER — CLINICAL SUPPORT (OUTPATIENT)
Dept: FAMILY MEDICINE CLINIC | Facility: CLINIC | Age: 63
End: 2023-09-25

## 2023-09-25 DIAGNOSIS — J30.89 ALLERGIC RHINITIS DUE TO OTHER ALLERGIC TRIGGER, UNSPECIFIED SEASONALITY: Primary | ICD-10-CM

## 2023-09-25 PROCEDURE — 95117 IMMUNOTHERAPY INJECTIONS: CPT | Performed by: FAMILY MEDICINE

## 2023-09-25 NOTE — PROGRESS NOTES
..  History of Present Illness    Immunotherapy Pre-Injection Questionnaire:         Are you currently pregnant or been diagnosed with a new medical condition? No    1. Have you had increased asthmas symptoms (chest tightness, increased cough, wheezing, or shortness of breath) in the past week? No    2. Have you had increase allergy symptoms (itching eyes or nose, sneezing, runny nose, post-nasal drip, or throat-clearing) in the past week? No    3. Have you had a cold, respiratory tract infection, or flu-like symptoms in the past two weeks? No    4. Did you have any problems such as increased allergy or asthma symptoms, hives, or generalized itching within 12 hours or receiving your last injection? No    5. Are you on any new medications? New eye drops? No    6. Patient confirmed their name and birth date on allergy vials are correct. Yes     Assessment/Plan   Problems Addressed this Visit    None  Diagnoses    None.       Diagnoses and all orders for this visit                Clemente Gaffney 9/25/2023

## 2023-09-26 ENCOUNTER — CLINICAL SUPPORT (OUTPATIENT)
Dept: FAMILY MEDICINE CLINIC | Facility: CLINIC | Age: 63
End: 2023-09-26
Payer: MEDICARE

## 2023-09-26 DIAGNOSIS — J30.89 ALLERGIC RHINITIS DUE TO OTHER ALLERGIC TRIGGER, UNSPECIFIED SEASONALITY: Primary | ICD-10-CM

## 2023-09-26 PROCEDURE — 95117 IMMUNOTHERAPY INJECTIONS: CPT | Performed by: FAMILY MEDICINE

## 2023-09-26 NOTE — PROGRESS NOTES
..  History of Present Illness    Immunotherapy Pre-Injection Questionnaire:         Are you currently pregnant or been diagnosed with a new medical condition? No    1. Have you had increased asthmas symptoms (chest tightness, increased cough, wheezing, or shortness of breath) in the past week? No    2. Have you had increase allergy symptoms (itching eyes or nose, sneezing, runny nose, post-nasal drip, or throat-clearing) in the past week? No    3. Have you had a cold, respiratory tract infection, or flu-like symptoms in the past two weeks? No    4. Did you have any problems such as increased allergy or asthma symptoms, hives, or generalized itching within 12 hours or receiving your last injection? No    5. Are you on any new medications? New eye drops? No    6. Patient confirmed their name and birth date on allergy vials are correct. Yes     Assessment/Plan   Problems Addressed this Visit    None  Visit Diagnoses       Allergic rhinitis due to other allergic trigger, unspecified seasonality    -  Primary    Relevant Medications    patient supplied allergy injection (Completed) (Start on 9/26/2023  2:30 PM)    patient supplied allergy injection (Completed) (Start on 9/26/2023  2:30 PM)          Diagnoses         Codes Comments    Allergic rhinitis due to other allergic trigger, unspecified seasonality    -  Primary ICD-10-CM: J30.89  ICD-9-CM: 477.8           Diagnoses and all orders for this visit                Clemente Gaffney 9/26/2023

## 2023-10-02 ENCOUNTER — CLINICAL SUPPORT (OUTPATIENT)
Dept: FAMILY MEDICINE CLINIC | Facility: CLINIC | Age: 63
End: 2023-10-02
Payer: MEDICARE

## 2023-10-02 DIAGNOSIS — J30.89 ALLERGIC RHINITIS DUE TO OTHER ALLERGIC TRIGGER, UNSPECIFIED SEASONALITY: Primary | ICD-10-CM

## 2023-10-02 PROCEDURE — 95117 IMMUNOTHERAPY INJECTIONS: CPT | Performed by: FAMILY MEDICINE

## 2023-10-04 ENCOUNTER — CLINICAL SUPPORT (OUTPATIENT)
Dept: FAMILY MEDICINE CLINIC | Facility: CLINIC | Age: 63
End: 2023-10-04
Payer: MEDICARE

## 2023-10-04 DIAGNOSIS — J30.89 ALLERGIC RHINITIS DUE TO OTHER ALLERGIC TRIGGER, UNSPECIFIED SEASONALITY: Primary | ICD-10-CM

## 2023-10-04 PROCEDURE — 95117 IMMUNOTHERAPY INJECTIONS: CPT | Performed by: FAMILY MEDICINE

## 2023-10-04 NOTE — PROGRESS NOTES
History of Present Illness    Immunotherapy Pre-Injection Questionnaire:         Are you currently pregnant or been diagnosed with a new medical condition? No    1. Have you had increased asthmas symptoms (chest tightness, increased cough, wheezing, or shortness of breath) in the past week? No    2. Have you had increase allergy symptoms (itching eyes or nose, sneezing, runny nose, post-nasal drip, or throat-clearing) in the past week? No    3. Have you had a cold, respiratory tract infection, or flu-like symptoms in the past two weeks? No    4. Did you have any problems such as increased allergy or asthma symptoms, hives, or generalized itching within 12 hours or receiving your last injection? No    5. Are you on any new medications? New eye drops? No    6. Patient confirmed their name and birth date on allergy vials are correct. Yes     Assessment/Plan   Diagnoses and all orders for this visit:    1. Allergic rhinitis due to other allergic trigger, unspecified seasonality (Primary)  -     patient supplied allergy injection  -     patient supplied allergy injection      Diagnoses and all orders for this visit      Patient tolerated well, no reaction after 30 min          Rashmi Reno RN 10/4/2023

## 2023-10-06 DIAGNOSIS — I10 PRIMARY HYPERTENSION: Chronic | ICD-10-CM

## 2023-10-06 RX ORDER — ATORVASTATIN CALCIUM 40 MG/1
TABLET, FILM COATED ORAL
Qty: 90 TABLET | Refills: 1 | Status: SHIPPED | OUTPATIENT
Start: 2023-10-06

## 2023-10-11 ENCOUNTER — CLINICAL SUPPORT (OUTPATIENT)
Dept: FAMILY MEDICINE CLINIC | Facility: CLINIC | Age: 63
End: 2023-10-11
Payer: MEDICARE

## 2023-10-11 DIAGNOSIS — J30.9 ALLERGIC RHINITIS, UNSPECIFIED SEASONALITY, UNSPECIFIED TRIGGER: Primary | ICD-10-CM

## 2023-10-11 PROCEDURE — 95117 IMMUNOTHERAPY INJECTIONS: CPT | Performed by: FAMILY MEDICINE

## 2023-10-11 NOTE — PROGRESS NOTES
History of Present Illness    Immunotherapy Pre-Injection Questionnaire:         Are you currently pregnant or been diagnosed with a new medical condition? No    1. Have you had increased asthmas symptoms (chest tightness, increased cough, wheezing, or shortness of breath) in the past week? No    2. Have you had increase allergy symptoms (itching eyes or nose, sneezing, runny nose, post-nasal drip, or throat-clearing) in the past week? No    3. Have you had a cold, respiratory tract infection, or flu-like symptoms in the past two weeks? No    4. Did you have any problems such as increased allergy or asthma symptoms, hives, or generalized itching within 12 hours or receiving your last injection? No    5. Are you on any new medications? New eye drops? No    6. Patient confirmed their name and birth date on allergy vials are correct. Yes     Assessment/Plan   There are no diagnoses linked to this encounter.  Diagnoses and all orders for this visit                Karely Siddiqui MA 10/11/2023

## 2023-10-16 ENCOUNTER — CLINICAL SUPPORT (OUTPATIENT)
Dept: FAMILY MEDICINE CLINIC | Facility: CLINIC | Age: 63
End: 2023-10-16
Payer: MEDICARE

## 2023-10-16 DIAGNOSIS — J30.9 ALLERGIC RHINITIS, UNSPECIFIED SEASONALITY, UNSPECIFIED TRIGGER: Primary | ICD-10-CM

## 2023-10-16 PROCEDURE — 95117 IMMUNOTHERAPY INJECTIONS: CPT | Performed by: FAMILY MEDICINE

## 2023-10-18 ENCOUNTER — LAB (OUTPATIENT)
Dept: LAB | Facility: HOSPITAL | Age: 63
End: 2023-10-18
Payer: MEDICARE

## 2023-10-18 ENCOUNTER — OFFICE VISIT (OUTPATIENT)
Dept: FAMILY MEDICINE CLINIC | Facility: CLINIC | Age: 63
End: 2023-10-18
Payer: MEDICARE

## 2023-10-18 VITALS
RESPIRATION RATE: 16 BRPM | HEART RATE: 111 BPM | TEMPERATURE: 98.7 F | BODY MASS INDEX: 22.9 KG/M2 | WEIGHT: 134.1 LBS | OXYGEN SATURATION: 94 % | HEIGHT: 64 IN | SYSTOLIC BLOOD PRESSURE: 112 MMHG | DIASTOLIC BLOOD PRESSURE: 60 MMHG

## 2023-10-18 DIAGNOSIS — R10.9 CHRONIC ABDOMINAL PAIN: ICD-10-CM

## 2023-10-18 DIAGNOSIS — G89.29 CHRONIC ABDOMINAL PAIN: ICD-10-CM

## 2023-10-18 DIAGNOSIS — Z79.899 MEDICATION MANAGEMENT: ICD-10-CM

## 2023-10-18 DIAGNOSIS — T78.40XA ALLERGY, UNSPECIFIED, INITIAL ENCOUNTER: ICD-10-CM

## 2023-10-18 DIAGNOSIS — Z91.018 ALLERGY TO MEAT: ICD-10-CM

## 2023-10-18 DIAGNOSIS — E11.42 DIABETIC POLYNEUROPATHY ASSOCIATED WITH TYPE 2 DIABETES MELLITUS: Primary | ICD-10-CM

## 2023-10-18 DIAGNOSIS — G62.9 NEUROPATHY: Chronic | ICD-10-CM

## 2023-10-18 DIAGNOSIS — F51.01 PRIMARY INSOMNIA: Chronic | ICD-10-CM

## 2023-10-18 DIAGNOSIS — E11.65 TYPE 2 DIABETES MELLITUS WITH HYPERGLYCEMIA, WITHOUT LONG-TERM CURRENT USE OF INSULIN: Chronic | ICD-10-CM

## 2023-10-18 PROCEDURE — 86008 ALLG SPEC IGE RECOMB EA: CPT

## 2023-10-18 PROCEDURE — 36415 COLL VENOUS BLD VENIPUNCTURE: CPT

## 2023-10-18 PROCEDURE — 86003 ALLG SPEC IGE CRUDE XTRC EA: CPT

## 2023-10-18 PROCEDURE — 82785 ASSAY OF IGE: CPT

## 2023-10-18 RX ORDER — AMOXICILLIN 500 MG/1
500 CAPSULE ORAL 2 TIMES DAILY
Qty: 20 CAPSULE | Refills: 0 | Status: SHIPPED | OUTPATIENT
Start: 2023-10-18 | End: 2023-10-28

## 2023-10-18 RX ORDER — GABAPENTIN 600 MG/1
TABLET ORAL
Status: CANCELLED | OUTPATIENT
Start: 2023-10-18

## 2023-10-18 RX ORDER — TEMAZEPAM 30 MG/1
30 CAPSULE ORAL NIGHTLY PRN
Qty: 30 CAPSULE | Refills: 5 | Status: SHIPPED | OUTPATIENT
Start: 2023-10-18

## 2023-10-18 RX ORDER — LOSARTAN POTASSIUM 25 MG/1
25 TABLET ORAL DAILY
Qty: 90 TABLET | Refills: 3 | Status: SHIPPED | OUTPATIENT
Start: 2023-10-18 | End: 2024-10-17

## 2023-10-18 NOTE — PROGRESS NOTES
"Chief Complaint  Nasal Congestion, Sore Throat, Ear Fullness, and Nausea (Discuss testing for red meat)    Subjective        Consuelo Carlson presents to [facility]  History of Present Illness        Objective   Vital Signs:  /60   Pulse 111   Temp 98.7 °F (37.1 °C)   Resp 16   Ht 162.6 cm (64\")   Wt 60.8 kg (134 lb 1.6 oz)   SpO2 94%   BMI 23.02 kg/m²   Estimated body mass index is 23.02 kg/m² as calculated from the following:    Height as of this encounter: 162.6 cm (64\").    Weight as of this encounter: 60.8 kg (134 lb 1.6 oz).       BMI is within normal parameters. No other follow-up for BMI required.      Physical Exam  Vitals reviewed.   Constitutional:       Appearance: Normal appearance. She is well-developed.   HENT:      Head: Normocephalic and atraumatic.      Right Ear: External ear normal.      Left Ear: External ear normal.      Nose: Congestion and rhinorrhea present.      Mouth/Throat:      Pharynx: Posterior oropharyngeal erythema present.   Eyes:      Conjunctiva/sclera: Conjunctivae normal.      Pupils: Pupils are equal, round, and reactive to light.   Cardiovascular:      Rate and Rhythm: Normal rate.   Pulmonary:      Effort: Pulmonary effort is normal.      Breath sounds: Normal breath sounds.   Abdominal:      General: There is no distension.   Skin:     General: Skin is warm and dry.   Neurological:      Mental Status: She is alert and oriented to person, place, and time. Mental status is at baseline.   Psychiatric:         Mood and Affect: Mood and affect normal.         Behavior: Behavior normal.         Thought Content: Thought content normal.         Judgment: Judgment normal.        Result Review :  The following data was reviewed by: Paul Choi DO on 10/18/2023:  Common labs          3/20/2023    14:06 6/12/2023    08:51 6/14/2023    10:28   Common Labs   Glucose   200    BUN   14    Creatinine   0.65    Sodium   142    Potassium   4.4 "    Chloride   106    Calcium   8.8    Albumin   4.4    Total Bilirubin   0.5    Alkaline Phosphatase   79    AST (SGOT)   20    ALT (SGPT)   20    WBC   3.65    Hemoglobin   13.3    Hematocrit   39.6    Platelets   143    Total Cholesterol   116    Triglycerides   295    HDL Cholesterol   34    LDL Cholesterol    38    Hemoglobin A1C 6.6  6.9  6.80    Microalbumin, Urine   <1.2                   Assessment and Plan   Diagnoses and all orders for this visit:    1. Diabetic polyneuropathy associated with type 2 diabetes mellitus (Primary)    2. Type 2 diabetes mellitus with hyperglycemia, without long-term current use of insulin  -     losartan (COZAAR) 25 MG tablet; Take 1 tablet by mouth Daily.  Dispense: 90 tablet; Refill: 3    3. Neuropathy    4. Primary insomnia  -     temazepam (RESTORIL) 30 MG capsule; Take 1 capsule by mouth At Night As Needed for Anxiety or Sleep.  Dispense: 30 capsule; Refill: 5    5. Chronic abdominal pain  -     Alpha-Gal IgE Panel; Future    6. Allergy to meat  -     Alpha-Gal IgE Panel; Future    7. Allergy, unspecified, initial encounter  -     Alpha-Gal IgE Panel; Future                 Follow Up   Return in about 2 months (around 12/18/2023), or if symptoms worsen or fail to improve, for Recheck, Next scheduled follow up.  Patient was given instructions and counseling regarding her condition or for health maintenance advice. Please see specific information pulled into the AVS if appropriate.

## 2023-10-23 ENCOUNTER — CLINICAL SUPPORT (OUTPATIENT)
Dept: FAMILY MEDICINE CLINIC | Facility: CLINIC | Age: 63
End: 2023-10-23
Payer: MEDICARE

## 2023-10-23 DIAGNOSIS — J30.89 ALLERGIC RHINITIS DUE TO OTHER ALLERGIC TRIGGER, UNSPECIFIED SEASONALITY: Primary | ICD-10-CM

## 2023-10-23 LAB
ALPHA-GAL IGE QN: <0.1 KU/L
BEEF IGE QN: <0.1 KU/L
CONV CLASS DESCRIPTION: NORMAL
IGE SERPL-ACNC: 76 IU/ML (ref 6–495)
LAMB IGE QN: <0.1 KU/L
PORK IGE QN: <0.1 KU/L

## 2023-10-23 PROCEDURE — 95117 IMMUNOTHERAPY INJECTIONS: CPT | Performed by: FAMILY MEDICINE

## 2023-10-23 NOTE — PROGRESS NOTES
History of Present Illness    Immunotherapy Pre-Injection Questionnaire:         Are you currently pregnant or been diagnosed with a new medical condition? No    1. Have you had increased asthmas symptoms (chest tightness, increased cough, wheezing, or shortness of breath) in the past week? No    2. Have you had increase allergy symptoms (itching eyes or nose, sneezing, runny nose, post-nasal drip, or throat-clearing) in the past week? No    3. Have you had a cold, respiratory tract infection, or flu-like symptoms in the past two weeks? No    4. Did you have any problems such as increased allergy or asthma symptoms, hives, or generalized itching within 12 hours or receiving your last injection? No    5. Are you on any new medications? New eye drops? No    6. Patient confirmed their name and birth date on allergy vials are correct. Yes     Assessment/Plan   Diagnoses and all orders for this visit:    1. Allergic rhinitis due to other allergic trigger, unspecified seasonality (Primary)  -     patient supplied allergy injection  -     patient supplied allergy injection      Diagnoses and all orders for this visit      Patient tolerated well, no reaction after 30 min          Rashmi Reno RN 10/23/2023

## 2023-10-30 ENCOUNTER — CLINICAL SUPPORT (OUTPATIENT)
Dept: FAMILY MEDICINE CLINIC | Facility: CLINIC | Age: 63
End: 2023-10-30
Payer: MEDICARE

## 2023-10-30 DIAGNOSIS — I10 PRIMARY HYPERTENSION: Chronic | ICD-10-CM

## 2023-10-30 DIAGNOSIS — J30.89 ALLERGIC RHINITIS DUE TO OTHER ALLERGIC TRIGGER, UNSPECIFIED SEASONALITY: Primary | ICD-10-CM

## 2023-10-30 PROCEDURE — 95117 IMMUNOTHERAPY INJECTIONS: CPT | Performed by: FAMILY MEDICINE

## 2023-10-30 RX ORDER — METOPROLOL SUCCINATE 50 MG/1
50 TABLET, EXTENDED RELEASE ORAL DAILY
Qty: 90 TABLET | Refills: 2 | Status: SHIPPED | OUTPATIENT
Start: 2023-10-30

## 2023-10-30 NOTE — PROGRESS NOTES
History of Present Illness    Immunotherapy Pre-Injection Questionnaire:         Are you currently pregnant or been diagnosed with a new medical condition? No    1. Have you had increased asthmas symptoms (chest tightness, increased cough, wheezing, or shortness of breath) in the past week? No    2. Have you had increase allergy symptoms (itching eyes or nose, sneezing, runny nose, post-nasal drip, or throat-clearing) in the past week? No    3. Have you had a cold, respiratory tract infection, or flu-like symptoms in the past two weeks? No    4. Did you have any problems such as increased allergy or asthma symptoms, hives, or generalized itching within 12 hours or receiving your last injection? No    5. Are you on any new medications? New eye drops? No    6. Patient confirmed their name and birth date on allergy vials are correct. Yes     Assessment/Plan   Diagnoses and all orders for this visit:    1. Allergic rhinitis due to other allergic trigger, unspecified seasonality (Primary)  -     patient supplied allergy injection  -     patient supplied allergy injection      Diagnoses and all orders for this visit    Patient ttolerated well, no reaction after 30 min        Rashmi Reno RN 10/30/2023

## 2023-11-07 ENCOUNTER — OFFICE VISIT (OUTPATIENT)
Dept: FAMILY MEDICINE CLINIC | Facility: CLINIC | Age: 63
End: 2023-11-07
Payer: MEDICARE

## 2023-11-07 VITALS
DIASTOLIC BLOOD PRESSURE: 78 MMHG | OXYGEN SATURATION: 98 % | HEART RATE: 82 BPM | SYSTOLIC BLOOD PRESSURE: 124 MMHG | HEIGHT: 64 IN | TEMPERATURE: 97.8 F | BODY MASS INDEX: 23.32 KG/M2 | WEIGHT: 136.6 LBS | RESPIRATION RATE: 16 BRPM

## 2023-11-07 DIAGNOSIS — E11.42 DIABETIC POLYNEUROPATHY ASSOCIATED WITH TYPE 2 DIABETES MELLITUS: ICD-10-CM

## 2023-11-07 DIAGNOSIS — E11.65 TYPE 2 DIABETES MELLITUS WITH HYPERGLYCEMIA, WITHOUT LONG-TERM CURRENT USE OF INSULIN: Chronic | ICD-10-CM

## 2023-11-07 DIAGNOSIS — G47.00 INSOMNIA, UNSPECIFIED TYPE: Chronic | ICD-10-CM

## 2023-11-07 DIAGNOSIS — G62.9 NEUROPATHY: Chronic | ICD-10-CM

## 2023-11-07 DIAGNOSIS — J30.9 ALLERGIC RHINITIS, UNSPECIFIED SEASONALITY, UNSPECIFIED TRIGGER: ICD-10-CM

## 2023-11-07 DIAGNOSIS — E11.9 TYPE 2 DIABETES MELLITUS WITHOUT COMPLICATION, WITHOUT LONG-TERM CURRENT USE OF INSULIN: Primary | Chronic | ICD-10-CM

## 2023-11-07 RX ORDER — GABAPENTIN 600 MG/1
600 TABLET ORAL 2 TIMES DAILY
Qty: 180 TABLET | Refills: 3 | Status: SHIPPED | OUTPATIENT
Start: 2023-11-07

## 2023-11-07 RX ORDER — LOSARTAN POTASSIUM 25 MG/1
25 TABLET ORAL DAILY
Qty: 90 TABLET | Refills: 3 | Status: SHIPPED | OUTPATIENT
Start: 2023-11-07 | End: 2024-11-06

## 2023-11-07 NOTE — PROGRESS NOTES
"Chief Complaint  Follow-up, Diabetes, Peripheral Neuropathy, and Insomnia    Subjective        Consuelo Carlson presents to [facility]  History of Present Illness    Patient with improved symptoms taking gabapentin once at night metformin managing diabetes she is well controlled on conditions does still have some issues with eating beef testing has been negative could be related to recent viral illness or other discussed review medications if continues or no better in the next several months    Objective   Vital Signs:  /78   Pulse 82   Temp 97.8 °F (36.6 °C)   Resp 16   Ht 162.6 cm (64\")   Wt 62 kg (136 lb 9.6 oz)   SpO2 98%   BMI 23.45 kg/m²   Estimated body mass index is 23.45 kg/m² as calculated from the following:    Height as of this encounter: 162.6 cm (64\").    Weight as of this encounter: 62 kg (136 lb 9.6 oz).       BMI is within normal parameters. No other follow-up for BMI required.      Physical Exam  Vitals reviewed.   Constitutional:       Appearance: Normal appearance. She is well-developed.   HENT:      Head: Normocephalic and atraumatic.      Right Ear: External ear normal.      Left Ear: External ear normal.      Nose: Nose normal.   Eyes:      Conjunctiva/sclera: Conjunctivae normal.      Pupils: Pupils are equal, round, and reactive to light.   Cardiovascular:      Rate and Rhythm: Normal rate.   Pulmonary:      Effort: Pulmonary effort is normal.      Breath sounds: Normal breath sounds.   Abdominal:      General: There is no distension.   Skin:     General: Skin is warm and dry.   Neurological:      Mental Status: She is alert and oriented to person, place, and time. Mental status is at baseline.   Psychiatric:         Mood and Affect: Mood and affect normal.         Behavior: Behavior normal.         Thought Content: Thought content normal.         Judgment: Judgment normal.        Result Review :  The following data was reviewed by: Paul " DO Jimbo on 11/07/2023:  Common labs          3/20/2023    14:06 6/12/2023    08:51 6/14/2023    10:28   Common Labs   Glucose   200    BUN   14    Creatinine   0.65    Sodium   142    Potassium   4.4    Chloride   106    Calcium   8.8    Albumin   4.4    Total Bilirubin   0.5    Alkaline Phosphatase   79    AST (SGOT)   20    ALT (SGPT)   20    WBC   3.65    Hemoglobin   13.3    Hematocrit   39.6    Platelets   143    Total Cholesterol   116    Triglycerides   295    HDL Cholesterol   34    LDL Cholesterol    38    Hemoglobin A1C 6.6  6.9  6.80    Microalbumin, Urine   <1.2                   Assessment and Plan   Diagnoses and all orders for this visit:    1. Type 2 diabetes mellitus without complication, without long-term current use of insulin (Primary)    2. Type 2 diabetes mellitus with hyperglycemia, without long-term current use of insulin  -     losartan (COZAAR) 25 MG tablet; Take 1 tablet by mouth Daily.  Dispense: 90 tablet; Refill: 3    3. Diabetic polyneuropathy associated with type 2 diabetes mellitus    4. Insomnia, unspecified type    5. Neuropathy  -     gabapentin (NEURONTIN) 600 MG tablet; Take 1 tablet by mouth 2 (Two) Times a Day.  Dispense: 180 tablet; Refill: 3    6. Allergic rhinitis, unspecified seasonality, unspecified trigger  -     patient supplied allergy injection  -     patient supplied allergy injection      Continue medicine for chronic conditions updated arabella and Sebastian reviewed UDS appropriate continue to take medications for diabetes with A1c every 3 to 6 months goal less than 7 diabetic polyneuropathy improved with gabapentin continue as prescribed consider alternative medicine if needed such as Lyrica taking gabapentin once at night contract baron Cortes reviewed UDS appropriate       Follow Up   Return in 6 months (on 5/7/2024), or if symptoms worsen or fail to improve, for Recheck, Labs before.  Patient was given instructions and counseling regarding her condition or  for health maintenance advice. Please see specific information pulled into the AVS if appropriate.

## 2023-11-09 ENCOUNTER — CLINICAL SUPPORT (OUTPATIENT)
Dept: FAMILY MEDICINE CLINIC | Facility: CLINIC | Age: 63
End: 2023-11-09
Payer: MEDICARE

## 2023-11-09 DIAGNOSIS — J30.9 ALLERGIC RHINITIS, UNSPECIFIED SEASONALITY, UNSPECIFIED TRIGGER: Primary | ICD-10-CM

## 2023-11-09 PROCEDURE — 95117 IMMUNOTHERAPY INJECTIONS: CPT | Performed by: FAMILY MEDICINE

## 2023-11-09 NOTE — PROGRESS NOTES
..  History of Present Illness    Immunotherapy Pre-Injection Questionnaire:         Are you currently pregnant or been diagnosed with a new medical condition? No    1. Have you had increased asthmas symptoms (chest tightness, increased cough, wheezing, or shortness of breath) in the past week? No    2. Have you had increase allergy symptoms (itching eyes or nose, sneezing, runny nose, post-nasal drip, or throat-clearing) in the past week? No    3. Have you had a cold, respiratory tract infection, or flu-like symptoms in the past two weeks? No    4. Did you have any problems such as increased allergy or asthma symptoms, hives, or generalized itching within 12 hours or receiving your last injection? No    5. Are you on any new medications? New eye drops? No    6. Patient confirmed their name and birth date on allergy vials are correct. Yes     Assessment/Plan   Problems Addressed this Visit    None  Visit Diagnoses       Allergic rhinitis, unspecified seasonality, unspecified trigger    -  Primary    Relevant Medications    patient supplied allergy injection (Start on 11/9/2023 12:15 PM)    patient supplied allergy injection (Start on 11/9/2023 12:15 PM)          Diagnoses         Codes Comments    Allergic rhinitis, unspecified seasonality, unspecified trigger    -  Primary ICD-10-CM: J30.9  ICD-9-CM: 477.9           Diagnoses and all orders for this visit                Clemente Gaffney 11/9/2023

## 2023-11-13 ENCOUNTER — CLINICAL SUPPORT (OUTPATIENT)
Dept: FAMILY MEDICINE CLINIC | Facility: CLINIC | Age: 63
End: 2023-11-13
Payer: MEDICARE

## 2023-11-13 DIAGNOSIS — J30.9 ALLERGIC RHINITIS, UNSPECIFIED SEASONALITY, UNSPECIFIED TRIGGER: Primary | ICD-10-CM

## 2023-11-13 NOTE — PROGRESS NOTES
History of Present Illness    Immunotherapy Pre-Injection Questionnaire:         Are you currently pregnant or been diagnosed with a new medical condition? No    1. Have you had increased asthmas symptoms (chest tightness, increased cough, wheezing, or shortness of breath) in the past week? No    2. Have you had increase allergy symptoms (itching eyes or nose, sneezing, runny nose, post-nasal drip, or throat-clearing) in the past week? No    3. Have you had a cold, respiratory tract infection, or flu-like symptoms in the past two weeks? No    4. Did you have any problems such as increased allergy or asthma symptoms, hives, or generalized itching within 12 hours or receiving your last injection? No    5. Are you on any new medications? New eye drops? No    6. Patient confirmed their name and birth date on allergy vials are correct. No     Assessment/Plan   Diagnoses and all orders for this visit:    1. Allergic rhinitis, unspecified seasonality, unspecified trigger (Primary)  -     patient supplied allergy injection  -     patient supplied allergy injection    pt tolerated procedure well no reaction after waiting in office for thirty minutes.     Diagnoses and all orders for this visit                Letitia Olson 11/13/2023

## 2023-11-15 ENCOUNTER — CLINICAL SUPPORT (OUTPATIENT)
Dept: FAMILY MEDICINE CLINIC | Facility: CLINIC | Age: 63
End: 2023-11-15
Payer: MEDICARE

## 2023-11-15 DIAGNOSIS — J30.9 ALLERGIC RHINITIS, UNSPECIFIED SEASONALITY, UNSPECIFIED TRIGGER: Primary | ICD-10-CM

## 2023-11-15 PROCEDURE — 95117 IMMUNOTHERAPY INJECTIONS: CPT | Performed by: FAMILY MEDICINE

## 2023-11-15 NOTE — PROGRESS NOTES
History of Present Illness    Immunotherapy Pre-Injection Questionnaire:         Are you currently pregnant or been diagnosed with a new medical condition? No    1. Have you had increased asthmas symptoms (chest tightness, increased cough, wheezing, or shortness of breath) in the past week? No    2. Have you had increase allergy symptoms (itching eyes or nose, sneezing, runny nose, post-nasal drip, or throat-clearing) in the past week? No    3. Have you had a cold, respiratory tract infection, or flu-like symptoms in the past two weeks? No    4. Did you have any problems such as increased allergy or asthma symptoms, hives, or generalized itching within 12 hours or receiving your last injection? No    5. Are you on any new medications? New eye drops? No    6. Patient confirmed their name and birth date on allergy vials are correct. Yes     Assessment/Plan   Diagnoses and all orders for this visit:    1. Allergic rhinitis, unspecified seasonality, unspecified trigger (Primary)  -     patient supplied allergy injection  -     patient supplied allergy injection      Diagnoses and all orders for this visit        Patient tolerated well, no reaction after 30 min        Rashmi Reno RN 11/15/2023

## 2023-11-20 ENCOUNTER — CLINICAL SUPPORT (OUTPATIENT)
Dept: FAMILY MEDICINE CLINIC | Facility: CLINIC | Age: 63
End: 2023-11-20
Payer: MEDICARE

## 2023-11-20 DIAGNOSIS — J30.9 ALLERGIC RHINITIS, UNSPECIFIED SEASONALITY, UNSPECIFIED TRIGGER: Primary | ICD-10-CM

## 2023-11-20 PROCEDURE — 95117 IMMUNOTHERAPY INJECTIONS: CPT | Performed by: FAMILY MEDICINE

## 2023-11-20 NOTE — PROGRESS NOTES
..  History of Present Illness    Immunotherapy Pre-Injection Questionnaire:         Are you currently pregnant or been diagnosed with a new medical condition? No    1. Have you had increased asthmas symptoms (chest tightness, increased cough, wheezing, or shortness of breath) in the past week? No    2. Have you had increase allergy symptoms (itching eyes or nose, sneezing, runny nose, post-nasal drip, or throat-clearing) in the past week? No    3. Have you had a cold, respiratory tract infection, or flu-like symptoms in the past two weeks? No    4. Did you have any problems such as increased allergy or asthma symptoms, hives, or generalized itching within 12 hours or receiving your last injection? No    5. Are you on any new medications? New eye drops? No    6. Patient confirmed their name and birth date on allergy vials are correct. Yes     Assessment/Plan   Problems Addressed this Visit    None  Visit Diagnoses       Allergic rhinitis, unspecified seasonality, unspecified trigger    -  Primary    Relevant Medications    patient supplied allergy injection (Start on 11/20/2023 12:00 PM)    patient supplied allergy injection (Start on 11/20/2023 12:00 PM)          Diagnoses         Codes Comments    Allergic rhinitis, unspecified seasonality, unspecified trigger    -  Primary ICD-10-CM: J30.9  ICD-9-CM: 477.9           Diagnoses and all orders for this visit                Clemente Gaffney 11/20/2023

## 2023-11-27 ENCOUNTER — CLINICAL SUPPORT (OUTPATIENT)
Dept: FAMILY MEDICINE CLINIC | Facility: CLINIC | Age: 63
End: 2023-11-27
Payer: MEDICARE

## 2023-11-27 DIAGNOSIS — J30.9 ALLERGIC RHINITIS, UNSPECIFIED SEASONALITY, UNSPECIFIED TRIGGER: Primary | ICD-10-CM

## 2023-11-27 PROCEDURE — 95117 IMMUNOTHERAPY INJECTIONS: CPT | Performed by: FAMILY MEDICINE

## 2023-11-27 NOTE — PROGRESS NOTES
..  History of Present Illness    Immunotherapy Pre-Injection Questionnaire:         Are you currently pregnant or been diagnosed with a new medical condition? No    1. Have you had increased asthmas symptoms (chest tightness, increased cough, wheezing, or shortness of breath) in the past week? No    2. Have you had increase allergy symptoms (itching eyes or nose, sneezing, runny nose, post-nasal drip, or throat-clearing) in the past week? No    3. Have you had a cold, respiratory tract infection, or flu-like symptoms in the past two weeks? No    4. Did you have any problems such as increased allergy or asthma symptoms, hives, or generalized itching within 12 hours or receiving your last injection? No    5. Are you on any new medications? New eye drops? No    6. Patient confirmed their name and birth date on allergy vials are correct. Yes     Assessment/Plan   Problems Addressed this Visit    None  Visit Diagnoses       Allergic rhinitis, unspecified seasonality, unspecified trigger    -  Primary    Relevant Medications    patient supplied allergy injection (Completed) (Start on 11/27/2023 11:45 AM)    patient supplied allergy injection (Completed) (Start on 11/27/2023 11:45 AM)          Diagnoses         Codes Comments    Allergic rhinitis, unspecified seasonality, unspecified trigger    -  Primary ICD-10-CM: J30.9  ICD-9-CM: 477.9           Diagnoses and all orders for this visit                Clemente Gaffney 11/27/2023

## 2023-11-29 DIAGNOSIS — F51.01 PRIMARY INSOMNIA: Chronic | ICD-10-CM

## 2023-11-29 DIAGNOSIS — F32.5 MAJOR DEPRESSIVE DISORDER IN FULL REMISSION, UNSPECIFIED WHETHER RECURRENT: Chronic | ICD-10-CM

## 2023-11-29 RX ORDER — VENLAFAXINE HYDROCHLORIDE 150 MG/1
150 CAPSULE, EXTENDED RELEASE ORAL DAILY
Qty: 90 CAPSULE | Refills: 2 | Status: SHIPPED | OUTPATIENT
Start: 2023-11-29

## 2023-12-04 ENCOUNTER — CLINICAL SUPPORT (OUTPATIENT)
Dept: FAMILY MEDICINE CLINIC | Facility: CLINIC | Age: 63
End: 2023-12-04
Payer: MEDICARE

## 2023-12-04 DIAGNOSIS — J30.9 ALLERGIC RHINITIS, UNSPECIFIED SEASONALITY, UNSPECIFIED TRIGGER: Primary | ICD-10-CM

## 2023-12-04 PROCEDURE — 95117 IMMUNOTHERAPY INJECTIONS: CPT | Performed by: FAMILY MEDICINE

## 2023-12-04 RX ORDER — AMOXICILLIN 500 MG/1
500 CAPSULE ORAL 2 TIMES DAILY
Qty: 20 CAPSULE | Refills: 0 | Status: SHIPPED | OUTPATIENT
Start: 2023-12-04 | End: 2023-12-14

## 2023-12-04 NOTE — PROGRESS NOTES
..  History of Present Illness    Immunotherapy Pre-Injection Questionnaire:         Are you currently pregnant or been diagnosed with a new medical condition? No    1. Have you had increased asthmas symptoms (chest tightness, increased cough, wheezing, or shortness of breath) in the past week? No    2. Have you had increase allergy symptoms (itching eyes or nose, sneezing, runny nose, post-nasal drip, or throat-clearing) in the past week? No    3. Have you had a cold, respiratory tract infection, or flu-like symptoms in the past two weeks? No    4. Did you have any problems such as increased allergy or asthma symptoms, hives, or generalized itching within 12 hours or receiving your last injection? No    5. Are you on any new medications? New eye drops? No    6. Patient confirmed their name and birth date on allergy vials are correct. Yes     Assessment/Plan   Problems Addressed this Visit    None  Visit Diagnoses       Allergic rhinitis, unspecified seasonality, unspecified trigger    -  Primary    Relevant Medications    patient supplied allergy injection (Start on 12/4/2023  9:00 AM)    patient supplied allergy injection (Start on 12/4/2023  9:00 AM)          Diagnoses         Codes Comments    Allergic rhinitis, unspecified seasonality, unspecified trigger    -  Primary ICD-10-CM: J30.9  ICD-9-CM: 477.9           Diagnoses and all orders for this visit                Clemente Gaffney 12/4/2023

## 2023-12-19 ENCOUNTER — CLINICAL SUPPORT (OUTPATIENT)
Dept: FAMILY MEDICINE CLINIC | Facility: CLINIC | Age: 63
End: 2023-12-19
Payer: MEDICARE

## 2023-12-19 DIAGNOSIS — J30.9 ALLERGIC RHINITIS, UNSPECIFIED SEASONALITY, UNSPECIFIED TRIGGER: Primary | ICD-10-CM

## 2023-12-19 NOTE — PROGRESS NOTES
..  History of Present Illness    Immunotherapy Pre-Injection Questionnaire:         Are you currently pregnant or been diagnosed with a new medical condition? No    1. Have you had increased asthmas symptoms (chest tightness, increased cough, wheezing, or shortness of breath) in the past week? No    2. Have you had increase allergy symptoms (itching eyes or nose, sneezing, runny nose, post-nasal drip, or throat-clearing) in the past week? No    3. Have you had a cold, respiratory tract infection, or flu-like symptoms in the past two weeks? No    4. Did you have any problems such as increased allergy or asthma symptoms, hives, or generalized itching within 12 hours or receiving your last injection? No    5. Are you on any new medications? New eye drops? No    6. Patient confirmed their name and birth date on allergy vials are correct. Yes     Assessment/Plan   Problems Addressed this Visit    None  Visit Diagnoses       Allergic rhinitis, unspecified seasonality, unspecified trigger    -  Primary    Relevant Medications    patient supplied allergy injection (Start on 12/19/2023 10:15 AM)    patient supplied allergy injection (Start on 12/19/2023 10:15 AM)          Diagnoses         Codes Comments    Allergic rhinitis, unspecified seasonality, unspecified trigger    -  Primary ICD-10-CM: J30.9  ICD-9-CM: 477.9           Diagnoses and all orders for this visit                Clemente Gaffney 12/19/2023

## 2023-12-20 RX ORDER — CLOBETASOL PROPIONATE 0.5 MG/G
CREAM TOPICAL
Qty: 30 G | Refills: 1 | Status: SHIPPED | OUTPATIENT
Start: 2023-12-20

## 2024-01-04 ENCOUNTER — CLINICAL SUPPORT (OUTPATIENT)
Dept: FAMILY MEDICINE CLINIC | Facility: CLINIC | Age: 64
End: 2024-01-04
Payer: COMMERCIAL

## 2024-01-04 DIAGNOSIS — J30.9 ALLERGIC RHINITIS, UNSPECIFIED SEASONALITY, UNSPECIFIED TRIGGER: Primary | ICD-10-CM

## 2024-01-04 PROCEDURE — 95117 IMMUNOTHERAPY INJECTIONS: CPT | Performed by: FAMILY MEDICINE

## 2024-01-04 NOTE — PROGRESS NOTES
History of Present Illness    Immunotherapy Pre-Injection Questionnaire:         Are you currently pregnant or been diagnosed with a new medical condition? No    1. Have you had increased asthmas symptoms (chest tightness, increased cough, wheezing, or shortness of breath) in the past week? No    2. Have you had increase allergy symptoms (itching eyes or nose, sneezing, runny nose, post-nasal drip, or throat-clearing) in the past week? No    3. Have you had a cold, respiratory tract infection, or flu-like symptoms in the past two weeks? No    4. Did you have any problems such as increased allergy or asthma symptoms, hives, or generalized itching within 12 hours or receiving your last injection? No    5. Are you on any new medications? New eye drops? No    6. Patient confirmed their name and birth date on allergy vials are correct. Yes     Assessment/Plan   Diagnoses and all orders for this visit:    1. Allergic rhinitis, unspecified seasonality, unspecified trigger (Primary)  -     patient supplied allergy injection  -     patient supplied allergy injection      Diagnoses and all orders for this visit      Patient tolerated well, no reaction after 30 min          Rashmi Reno RN 1/4/2024

## 2024-01-08 ENCOUNTER — CLINICAL SUPPORT (OUTPATIENT)
Dept: FAMILY MEDICINE CLINIC | Facility: CLINIC | Age: 64
End: 2024-01-08
Payer: COMMERCIAL

## 2024-01-08 DIAGNOSIS — J30.9 ALLERGIC RHINITIS, UNSPECIFIED SEASONALITY, UNSPECIFIED TRIGGER: Primary | ICD-10-CM

## 2024-01-08 PROCEDURE — 95117 IMMUNOTHERAPY INJECTIONS: CPT | Performed by: FAMILY MEDICINE

## 2024-01-08 NOTE — PROGRESS NOTES
History of Present Illness    Immunotherapy Pre-Injection Questionnaire:         Are you currently pregnant or been diagnosed with a new medical condition? No    1. Have you had increased asthmas symptoms (chest tightness, increased cough, wheezing, or shortness of breath) in the past week? No    2. Have you had increase allergy symptoms (itching eyes or nose, sneezing, runny nose, post-nasal drip, or throat-clearing) in the past week? No    3. Have you had a cold, respiratory tract infection, or flu-like symptoms in the past two weeks? No    4. Did you have any problems such as increased allergy or asthma symptoms, hives, or generalized itching within 12 hours or receiving your last injection? No    5. Are you on any new medications? New eye drops? No    6. Patient confirmed their name and birth date on allergy vials are correct. Yes     Assessment/Plan   There are no diagnoses linked to this encounter.  Diagnoses and all orders for this visit                Karely Siddiqui MA 1/8/2024

## 2024-01-12 ENCOUNTER — CLINICAL SUPPORT (OUTPATIENT)
Dept: FAMILY MEDICINE CLINIC | Facility: CLINIC | Age: 64
End: 2024-01-12
Payer: COMMERCIAL

## 2024-01-12 DIAGNOSIS — J30.9 ALLERGIC RHINITIS, UNSPECIFIED SEASONALITY, UNSPECIFIED TRIGGER: Primary | ICD-10-CM

## 2024-01-12 PROCEDURE — 95117 IMMUNOTHERAPY INJECTIONS: CPT | Performed by: FAMILY MEDICINE

## 2024-01-12 NOTE — PROGRESS NOTES
..  History of Present Illness    Immunotherapy Pre-Injection Questionnaire:         Are you currently pregnant or been diagnosed with a new medical condition? No    1. Have you had increased asthmas symptoms (chest tightness, increased cough, wheezing, or shortness of breath) in the past week? No    2. Have you had increase allergy symptoms (itching eyes or nose, sneezing, runny nose, post-nasal drip, or throat-clearing) in the past week? No    3. Have you had a cold, respiratory tract infection, or flu-like symptoms in the past two weeks? No    4. Did you have any problems such as increased allergy or asthma symptoms, hives, or generalized itching within 12 hours or receiving your last injection? No    5. Are you on any new medications? New eye drops? No    6. Patient confirmed their name and birth date on allergy vials are correct. Yes     Assessment/Plan   Problems Addressed this Visit    None  Visit Diagnoses       Allergic rhinitis, unspecified seasonality, unspecified trigger    -  Primary    Relevant Medications    patient supplied allergy injection (Completed) (Start on 1/12/2024 10:45 AM)    patient supplied allergy injection (Completed) (Start on 1/12/2024 10:45 AM)          Diagnoses         Codes Comments    Allergic rhinitis, unspecified seasonality, unspecified trigger    -  Primary ICD-10-CM: J30.9  ICD-9-CM: 477.9           Diagnoses and all orders for this visit                Clemente Gaffney 1/12/2024

## 2024-01-18 ENCOUNTER — CLINICAL SUPPORT (OUTPATIENT)
Dept: FAMILY MEDICINE CLINIC | Facility: CLINIC | Age: 64
End: 2024-01-18
Payer: COMMERCIAL

## 2024-01-18 DIAGNOSIS — J30.9 ALLERGIC RHINITIS, UNSPECIFIED SEASONALITY, UNSPECIFIED TRIGGER: Primary | ICD-10-CM

## 2024-01-18 PROCEDURE — 95117 IMMUNOTHERAPY INJECTIONS: CPT | Performed by: FAMILY MEDICINE

## 2024-01-18 NOTE — PROGRESS NOTES
..  History of Present Illness    Immunotherapy Pre-Injection Questionnaire:         Are you currently pregnant or been diagnosed with a new medical condition? No    1. Have you had increased asthmas symptoms (chest tightness, increased cough, wheezing, or shortness of breath) in the past week? No    2. Have you had increase allergy symptoms (itching eyes or nose, sneezing, runny nose, post-nasal drip, or throat-clearing) in the past week? No    3. Have you had a cold, respiratory tract infection, or flu-like symptoms in the past two weeks? No    4. Did you have any problems such as increased allergy or asthma symptoms, hives, or generalized itching within 12 hours or receiving your last injection? No    5. Are you on any new medications? New eye drops? No    6. Patient confirmed their name and birth date on allergy vials are correct. Yes     Assessment/Plan   Problems Addressed this Visit    None  Visit Diagnoses       Allergic rhinitis, unspecified seasonality, unspecified trigger    -  Primary    Relevant Medications    patient supplied allergy injection (Start on 1/18/2024  2:00 PM)    patient supplied allergy injection (Start on 1/18/2024  2:00 PM)          Diagnoses         Codes Comments    Allergic rhinitis, unspecified seasonality, unspecified trigger    -  Primary ICD-10-CM: J30.9  ICD-9-CM: 477.9           Diagnoses and all orders for this visit                Clemente Gaffney 1/18/2024

## 2024-01-24 ENCOUNTER — CLINICAL SUPPORT (OUTPATIENT)
Dept: FAMILY MEDICINE CLINIC | Facility: CLINIC | Age: 64
End: 2024-01-24
Payer: MEDICARE

## 2024-01-24 DIAGNOSIS — J30.9 ALLERGIC RHINITIS, UNSPECIFIED SEASONALITY, UNSPECIFIED TRIGGER: Primary | ICD-10-CM

## 2024-01-24 PROCEDURE — 95117 IMMUNOTHERAPY INJECTIONS: CPT | Performed by: FAMILY MEDICINE

## 2024-01-24 NOTE — PROGRESS NOTES
History of Present Illness    Immunotherapy Pre-Injection Questionnaire:         Are you currently pregnant or been diagnosed with a new medical condition? No    1. Have you had increased asthmas symptoms (chest tightness, increased cough, wheezing, or shortness of breath) in the past week? No    2. Have you had increase allergy symptoms (itching eyes or nose, sneezing, runny nose, post-nasal drip, or throat-clearing) in the past week? No    3. Have you had a cold, respiratory tract infection, or flu-like symptoms in the past two weeks? No    4. Did you have any problems such as increased allergy or asthma symptoms, hives, or generalized itching within 12 hours or receiving your last injection? No    5. Are you on any new medications? New eye drops? No    6. Patient confirmed their name and birth date on allergy vials are correct. Yes     Assessment/Plan   Diagnoses and all orders for this visit:    1. Allergic rhinitis, unspecified seasonality, unspecified trigger (Primary)  -     patient supplied allergy injection  -     patient supplied allergy injection      Diagnoses and all orders for this visit        Patient tolerated well, no reaction after 30 min        Rashmi Reno RN 1/24/2024

## 2024-01-29 ENCOUNTER — CLINICAL SUPPORT (OUTPATIENT)
Dept: FAMILY MEDICINE CLINIC | Facility: CLINIC | Age: 64
End: 2024-01-29
Payer: MEDICARE

## 2024-01-29 DIAGNOSIS — J30.9 ALLERGIC RHINITIS, UNSPECIFIED SEASONALITY, UNSPECIFIED TRIGGER: Primary | ICD-10-CM

## 2024-01-29 PROCEDURE — 95117 IMMUNOTHERAPY INJECTIONS: CPT | Performed by: FAMILY MEDICINE

## 2024-01-29 NOTE — PROGRESS NOTES
History of Present Illness    Immunotherapy Pre-Injection Questionnaire:         Are you currently pregnant or been diagnosed with a new medical condition? No    1. Have you had increased asthmas symptoms (chest tightness, increased cough, wheezing, or shortness of breath) in the past week? No    2. Have you had increase allergy symptoms (itching eyes or nose, sneezing, runny nose, post-nasal drip, or throat-clearing) in the past week? No    3. Have you had a cold, respiratory tract infection, or flu-like symptoms in the past two weeks? No    4. Did you have any problems such as increased allergy or asthma symptoms, hives, or generalized itching within 12 hours or receiving your last injection? No    5. Are you on any new medications? New eye drops? No    6. Patient confirmed their name and birth date on allergy vials are correct. Yes     Assessment/Plan   There are no diagnoses linked to this encounter.  Diagnoses and all orders for this visit                Karely Siddiqui MA 1/29/2024

## 2024-02-05 ENCOUNTER — CLINICAL SUPPORT (OUTPATIENT)
Dept: FAMILY MEDICINE CLINIC | Facility: CLINIC | Age: 64
End: 2024-02-05
Payer: MEDICARE

## 2024-02-05 DIAGNOSIS — J30.9 ALLERGIC RHINITIS, UNSPECIFIED SEASONALITY, UNSPECIFIED TRIGGER: Primary | ICD-10-CM

## 2024-02-05 PROCEDURE — 95117 IMMUNOTHERAPY INJECTIONS: CPT | Performed by: FAMILY MEDICINE

## 2024-02-05 NOTE — PROGRESS NOTES
..  History of Present Illness    Immunotherapy Pre-Injection Questionnaire:         Are you currently pregnant or been diagnosed with a new medical condition? No    1. Have you had increased asthmas symptoms (chest tightness, increased cough, wheezing, or shortness of breath) in the past week? No    2. Have you had increase allergy symptoms (itching eyes or nose, sneezing, runny nose, post-nasal drip, or throat-clearing) in the past week? No    3. Have you had a cold, respiratory tract infection, or flu-like symptoms in the past two weeks? No    4. Did you have any problems such as increased allergy or asthma symptoms, hives, or generalized itching within 12 hours or receiving your last injection? No    5. Are you on any new medications? New eye drops? No    6. Patient confirmed their name and birth date on allergy vials are correct. Yes     Assessment/Plan   Problems Addressed this Visit    None  Visit Diagnoses       Allergic rhinitis, unspecified seasonality, unspecified trigger    -  Primary    Relevant Medications    patient supplied allergy injection (Completed) (Start on 2/5/2024  5:00 PM)    patient supplied allergy injection (Completed) (Start on 2/5/2024  5:00 PM)          Diagnoses         Codes Comments    Allergic rhinitis, unspecified seasonality, unspecified trigger    -  Primary ICD-10-CM: J30.9  ICD-9-CM: 477.9           Diagnoses and all orders for this visit                Clemente Gaffney 2/5/2024

## 2024-02-09 ENCOUNTER — TELEPHONE (OUTPATIENT)
Dept: OBSTETRICS AND GYNECOLOGY | Facility: CLINIC | Age: 64
End: 2024-02-09
Payer: MEDICARE

## 2024-02-09 RX ORDER — VALACYCLOVIR HYDROCHLORIDE 1 G/1
1000 TABLET, FILM COATED ORAL DAILY
Qty: 30 TABLET | Refills: 3 | Status: SHIPPED | OUTPATIENT
Start: 2024-02-09

## 2024-02-09 NOTE — TELEPHONE ENCOUNTER
Patient is needing a refill of valtrex if possible, she is asking if you would send in 30 pills, that will last her a while.     Please advise, thanks!    Pharmacy on file confirmed.

## 2024-02-10 NOTE — DISCHARGE SUMMARY
DATE OF ADMISSION:  04/03/2017  DATE OF DISCHARGE:  04/04/2017    PRIMARY CARE PHYSICIAN:  Boris Velásquez MD    ADMITTING/DISCHARGE DIAGNOSIS:  Bilateral severe symptomatic macromastia.       PROCEDURE PERFORMED: On 04/03/2017 she underwent bilateral reduction mammoplasty.     SURGEON: LILIAN Lebron MD     HISTORY: This patient has long-suffered with severe back, neck, and shoulder pain. She has extremely large breasts and also suffers from macromastia.  We discussed and it had been recommended that she receive a reduction mammoplasty to help with her severe back, neck, and shoulder pain which she is on chronic pain medication for because of her bad back and neck. She understands the risks, benefits, and complications and agrees to proceed with reduction mammoplasty. She does live with a smoker and we stressed the idea of staying away from nicotine at all and staying away from secondhand smoke. She assures me that she has.  For the main history and physical, please see the admission history and physical.     HOSPITAL COURSE: The patient did well following surgery.  As a preventative measure, we did put nitroglycerin paste at the corners of the reduction mammoplasty incisions and the nipple areolar complex given her potential exposure to nicotine in a secondhand fashion. She did well on the 1st postoperative morning, there is no evidence of hematoma.  Drainage is minimal from the drains. Skin is soft. There is no tension on the closures. There is some bruising and she tells me she does bruise very easily, but there is no evidence of vascular compromise. Nipple areolar circulation has good 2-second refill.  Both nipples are pink and equal. She looks very good. I have reviewed my reduction mammoplasty discharge instructions with her and her  and given them a printed copy of this and I will see her on Friday. She is discharged home with Phenergan for nausea and Norco 7.5/325 take 1 p.o. q.6 h. p.r.n.  chin laceration pain, given #35 of these.  I have also advised her to let her primary care physician, who does prescribe her pain management at home, know that she has received 1 prescription.  That is generally all I do, 1 prescription of pain management medication.  She is also to resume all of her other medicines. I did ask her to hold off on her diuretic,  just take it every other day in the early postoperative period.  She is to see me Friday and call me if she has any problems or concerns.       LILIAN Lebron M.D.  RTN:june  D:   04/04/2017 07:22:26  T:   04/04/2017 10:16:42  Job ID:   [id number]  Document ID:   [id number]  cc:   Boris Velásquez M.D.

## 2024-02-12 ENCOUNTER — CLINICAL SUPPORT (OUTPATIENT)
Dept: FAMILY MEDICINE CLINIC | Facility: CLINIC | Age: 64
End: 2024-02-12
Payer: MEDICARE

## 2024-02-12 DIAGNOSIS — J30.9 ALLERGIC RHINITIS, UNSPECIFIED SEASONALITY, UNSPECIFIED TRIGGER: Primary | ICD-10-CM

## 2024-02-12 PROCEDURE — 95117 IMMUNOTHERAPY INJECTIONS: CPT | Performed by: FAMILY MEDICINE

## 2024-02-13 ENCOUNTER — OFFICE VISIT (OUTPATIENT)
Dept: DIABETES SERVICES | Facility: HOSPITAL | Age: 64
End: 2024-02-13
Payer: MEDICARE

## 2024-02-13 ENCOUNTER — OFFICE VISIT (OUTPATIENT)
Dept: OBSTETRICS AND GYNECOLOGY | Facility: CLINIC | Age: 64
End: 2024-02-13
Payer: MEDICARE

## 2024-02-13 VITALS
HEART RATE: 76 BPM | DIASTOLIC BLOOD PRESSURE: 72 MMHG | HEIGHT: 64 IN | BODY MASS INDEX: 22.36 KG/M2 | SYSTOLIC BLOOD PRESSURE: 114 MMHG | WEIGHT: 131 LBS | OXYGEN SATURATION: 98 %

## 2024-02-13 VITALS
HEIGHT: 64 IN | WEIGHT: 131 LBS | SYSTOLIC BLOOD PRESSURE: 133 MMHG | BODY MASS INDEX: 22.36 KG/M2 | DIASTOLIC BLOOD PRESSURE: 80 MMHG

## 2024-02-13 DIAGNOSIS — Z86.19 HISTORY OF POSITIVE PCR FOR HERPES SIMPLEX VIRUS TYPE 2 (HSV-2) DNA: ICD-10-CM

## 2024-02-13 DIAGNOSIS — R10.2 VAGINAL PAIN: Primary | ICD-10-CM

## 2024-02-13 DIAGNOSIS — E11.9 CONTROLLED TYPE 2 DIABETES MELLITUS WITHOUT COMPLICATION, WITHOUT LONG-TERM CURRENT USE OF INSULIN: Primary | ICD-10-CM

## 2024-02-13 LAB
EXPIRATION DATE: ABNORMAL
GLUCOSE BLDC GLUCOMTR-MCNC: 197 MG/DL (ref 70–99)
HBA1C MFR BLD: 6 % (ref 4.5–5.7)
Lab: ABNORMAL

## 2024-02-13 PROCEDURE — G0463 HOSPITAL OUTPT CLINIC VISIT: HCPCS | Performed by: NURSE PRACTITIONER

## 2024-02-13 PROCEDURE — 1159F MED LIST DOCD IN RCRD: CPT | Performed by: NURSE PRACTITIONER

## 2024-02-13 PROCEDURE — 3044F HG A1C LEVEL LT 7.0%: CPT | Performed by: NURSE PRACTITIONER

## 2024-02-13 PROCEDURE — 3079F DIAST BP 80-89 MM HG: CPT

## 2024-02-13 PROCEDURE — 3074F SYST BP LT 130 MM HG: CPT | Performed by: NURSE PRACTITIONER

## 2024-02-13 PROCEDURE — 3078F DIAST BP <80 MM HG: CPT | Performed by: NURSE PRACTITIONER

## 2024-02-13 PROCEDURE — 1160F RVW MEDS BY RX/DR IN RCRD: CPT | Performed by: NURSE PRACTITIONER

## 2024-02-13 PROCEDURE — 99213 OFFICE O/P EST LOW 20 MIN: CPT | Performed by: NURSE PRACTITIONER

## 2024-02-13 PROCEDURE — 82948 REAGENT STRIP/BLOOD GLUCOSE: CPT | Performed by: NURSE PRACTITIONER

## 2024-02-13 PROCEDURE — 3075F SYST BP GE 130 - 139MM HG: CPT

## 2024-02-13 PROCEDURE — 99213 OFFICE O/P EST LOW 20 MIN: CPT

## 2024-02-13 PROCEDURE — 1159F MED LIST DOCD IN RCRD: CPT

## 2024-02-13 PROCEDURE — 1160F RVW MEDS BY RX/DR IN RCRD: CPT

## 2024-02-13 RX ORDER — LIDOCAINE HYDROCHLORIDE 20 MG/ML
JELLY TOPICAL AS NEEDED
Qty: 30 ML | Refills: 3 | Status: SHIPPED | OUTPATIENT
Start: 2024-02-13 | End: 2024-02-18

## 2024-02-13 RX ORDER — METFORMIN HYDROCHLORIDE 500 MG/1
1000 TABLET, EXTENDED RELEASE ORAL 2 TIMES DAILY WITH MEALS
Qty: 360 TABLET | Refills: 1 | Status: SHIPPED | OUTPATIENT
Start: 2024-02-13 | End: 2024-08-11

## 2024-02-14 ENCOUNTER — TELEPHONE (OUTPATIENT)
Dept: OBSTETRICS AND GYNECOLOGY | Facility: CLINIC | Age: 64
End: 2024-02-14
Payer: MEDICARE

## 2024-02-15 RX ORDER — ESTRADIOL 0.1 MG/G
2 CREAM VAGINAL DAILY
Qty: 42.5 G | Refills: 3 | Status: SHIPPED | OUTPATIENT
Start: 2024-02-15

## 2024-02-22 ENCOUNTER — CLINICAL SUPPORT (OUTPATIENT)
Dept: FAMILY MEDICINE CLINIC | Facility: CLINIC | Age: 64
End: 2024-02-22
Payer: MEDICARE

## 2024-02-22 DIAGNOSIS — J30.9 ALLERGIC RHINITIS, UNSPECIFIED SEASONALITY, UNSPECIFIED TRIGGER: Primary | ICD-10-CM

## 2024-02-22 PROCEDURE — 95117 IMMUNOTHERAPY INJECTIONS: CPT | Performed by: FAMILY MEDICINE

## 2024-02-22 NOTE — PROGRESS NOTES
History of Present Illness    Immunotherapy Pre-Injection Questionnaire:         Are you currently pregnant or been diagnosed with a new medical condition? No    1. Have you had increased asthmas symptoms (chest tightness, increased cough, wheezing, or shortness of breath) in the past week? No    2. Have you had increase allergy symptoms (itching eyes or nose, sneezing, runny nose, post-nasal drip, or throat-clearing) in the past week? No    3. Have you had a cold, respiratory tract infection, or flu-like symptoms in the past two weeks? No    4. Did you have any problems such as increased allergy or asthma symptoms, hives, or generalized itching within 12 hours or receiving your last injection? No    5. Are you on any new medications? New eye drops? No    6. Patient confirmed their name and birth date on allergy vials are correct. Yes     Assessment/Plan   Diagnoses and all orders for this visit:    1. Allergic rhinitis, unspecified seasonality, unspecified trigger (Primary)  -     patient supplied allergy injection  -     patient supplied allergy injection      Diagnoses and all orders for this visit                Karely Siddiqui MA 2/22/2024

## 2024-02-26 ENCOUNTER — CLINICAL SUPPORT (OUTPATIENT)
Dept: FAMILY MEDICINE CLINIC | Facility: CLINIC | Age: 64
End: 2024-02-26
Payer: MEDICARE

## 2024-02-26 DIAGNOSIS — J30.9 ALLERGIC RHINITIS, UNSPECIFIED SEASONALITY, UNSPECIFIED TRIGGER: Primary | ICD-10-CM

## 2024-02-26 PROCEDURE — 95117 IMMUNOTHERAPY INJECTIONS: CPT | Performed by: FAMILY MEDICINE

## 2024-02-26 NOTE — PROGRESS NOTES
History of Present Illness    Immunotherapy Pre-Injection Questionnaire:         Are you currently pregnant or been diagnosed with a new medical condition? No    1. Have you had increased asthmas symptoms (chest tightness, increased cough, wheezing, or shortness of breath) in the past week? No    2. Have you had increase allergy symptoms (itching eyes or nose, sneezing, runny nose, post-nasal drip, or throat-clearing) in the past week? No    3. Have you had a cold, respiratory tract infection, or flu-like symptoms in the past two weeks? No    4. Did you have any problems such as increased allergy or asthma symptoms, hives, or generalized itching within 12 hours or receiving your last injection? No    5. Are you on any new medications? New eye drops? No    6. Patient confirmed their name and birth date on allergy vials are correct. Yes     Assessment/Plan   There are no diagnoses linked to this encounter.  Diagnoses and all orders for this visit                Karely Siddiqui MA 2/26/2024

## 2024-02-28 ENCOUNTER — OFFICE VISIT (OUTPATIENT)
Dept: FAMILY MEDICINE CLINIC | Facility: CLINIC | Age: 64
End: 2024-02-28
Payer: MEDICARE

## 2024-02-28 ENCOUNTER — LAB (OUTPATIENT)
Dept: LAB | Facility: HOSPITAL | Age: 64
End: 2024-02-28
Payer: MEDICARE

## 2024-02-28 VITALS
WEIGHT: 134.8 LBS | BODY MASS INDEX: 23.01 KG/M2 | SYSTOLIC BLOOD PRESSURE: 122 MMHG | DIASTOLIC BLOOD PRESSURE: 70 MMHG | OXYGEN SATURATION: 98 % | HEIGHT: 64 IN | HEART RATE: 86 BPM | TEMPERATURE: 97.8 F

## 2024-02-28 DIAGNOSIS — R11.0 NAUSEA: Primary | ICD-10-CM

## 2024-02-28 DIAGNOSIS — R11.0 NAUSEA: ICD-10-CM

## 2024-02-28 DIAGNOSIS — F51.01 PRIMARY INSOMNIA: ICD-10-CM

## 2024-02-28 DIAGNOSIS — E53.8 B12 DEFICIENCY: ICD-10-CM

## 2024-02-28 DIAGNOSIS — E11.9 TYPE 2 DIABETES MELLITUS WITHOUT COMPLICATION, WITHOUT LONG-TERM CURRENT USE OF INSULIN: ICD-10-CM

## 2024-02-28 LAB
BASOPHILS # BLD AUTO: 0.04 10*3/MM3 (ref 0–0.2)
BASOPHILS NFR BLD AUTO: 0.7 % (ref 0–1.5)
DEPRECATED RDW RBC AUTO: 44.2 FL (ref 37–54)
EOSINOPHIL # BLD AUTO: 0.18 10*3/MM3 (ref 0–0.4)
EOSINOPHIL NFR BLD AUTO: 3.1 % (ref 0.3–6.2)
ERYTHROCYTE [DISTWIDTH] IN BLOOD BY AUTOMATED COUNT: 14 % (ref 12.3–15.4)
HCT VFR BLD AUTO: 40.6 % (ref 34–46.6)
HGB BLD-MCNC: 13.7 G/DL (ref 12–15.9)
IMM GRANULOCYTES # BLD AUTO: 0.01 10*3/MM3 (ref 0–0.05)
IMM GRANULOCYTES NFR BLD AUTO: 0.2 % (ref 0–0.5)
LIPASE SERPL-CCNC: 63 U/L (ref 13–60)
LYMPHOCYTES # BLD AUTO: 0.86 10*3/MM3 (ref 0.7–3.1)
LYMPHOCYTES NFR BLD AUTO: 15 % (ref 19.6–45.3)
MCH RBC QN AUTO: 29.3 PG (ref 26.6–33)
MCHC RBC AUTO-ENTMCNC: 33.7 G/DL (ref 31.5–35.7)
MCV RBC AUTO: 86.8 FL (ref 79–97)
MONOCYTES # BLD AUTO: 0.31 10*3/MM3 (ref 0.1–0.9)
MONOCYTES NFR BLD AUTO: 5.4 % (ref 5–12)
NEUTROPHILS NFR BLD AUTO: 4.33 10*3/MM3 (ref 1.7–7)
NEUTROPHILS NFR BLD AUTO: 75.6 % (ref 42.7–76)
NRBC BLD AUTO-RTO: 0 /100 WBC (ref 0–0.2)
PLATELET # BLD AUTO: 148 10*3/MM3 (ref 140–450)
PMV BLD AUTO: 11.2 FL (ref 6–12)
RBC # BLD AUTO: 4.68 10*6/MM3 (ref 3.77–5.28)
T4 FREE SERPL-MCNC: 1.24 NG/DL (ref 0.93–1.7)
TSH SERPL DL<=0.05 MIU/L-ACNC: 2.35 UIU/ML (ref 0.27–4.2)
WBC NRBC COR # BLD AUTO: 5.73 10*3/MM3 (ref 3.4–10.8)

## 2024-02-28 PROCEDURE — 84439 ASSAY OF FREE THYROXINE: CPT

## 2024-02-28 PROCEDURE — 36415 COLL VENOUS BLD VENIPUNCTURE: CPT

## 2024-02-28 PROCEDURE — 85025 COMPLETE CBC W/AUTO DIFF WBC: CPT

## 2024-02-28 PROCEDURE — 84443 ASSAY THYROID STIM HORMONE: CPT

## 2024-02-28 PROCEDURE — 83690 ASSAY OF LIPASE: CPT

## 2024-02-28 RX ORDER — CYANOCOBALAMIN 1000 UG/ML
1000 INJECTION, SOLUTION INTRAMUSCULAR; SUBCUTANEOUS
Status: SHIPPED | OUTPATIENT
Start: 2024-02-28

## 2024-02-28 RX ADMIN — CYANOCOBALAMIN 1000 MCG: 1000 INJECTION, SOLUTION INTRAMUSCULAR; SUBCUTANEOUS at 14:04

## 2024-02-28 NOTE — PROGRESS NOTES
"Chief Complaint  Nausea (Pt states in October she started getting nausea and especially when she is hungry.) and spot on right shoulder blade (Red spot on right shoulder blade noticed when getting allergy inj.)    Subjective        Consuelo Carlson presents to [facility]  History of Present Illness    Objective   Vital Signs:  /70   Pulse 86   Temp 97.8 °F (36.6 °C) (Temporal)   Ht 162.6 cm (64.02\")   Wt 61.1 kg (134 lb 12.8 oz)   SpO2 98%   BMI 23.12 kg/m²   Estimated body mass index is 23.12 kg/m² as calculated from the following:    Height as of this encounter: 162.6 cm (64.02\").    Weight as of this encounter: 61.1 kg (134 lb 12.8 oz).       BMI is within normal parameters. No other follow-up for BMI required.      Physical Exam   Result Review :                     Assessment and Plan     Diagnoses and all orders for this visit:    1. Nausea (Primary)  -     Cancel: Hemoglobin A1c; Future  -     CBC Auto Differential; Future  -     Comprehensive Metabolic Panel; Future  -     Lipid Panel; Future  -     Vitamin B12 & Folate; Future  -     TSH+Free T4; Future  -     Lipase; Future    2. Primary insomnia  -     Cancel: Hemoglobin A1c; Future  -     CBC Auto Differential; Future  -     Comprehensive Metabolic Panel; Future  -     Lipid Panel; Future  -     Vitamin B12 & Folate; Future  -     TSH+Free T4; Future  -     Lipase; Future    3. Type 2 diabetes mellitus without complication, without long-term current use of insulin  -     Cancel: Hemoglobin A1c; Future  -     CBC Auto Differential; Future  -     Comprehensive Metabolic Panel; Future  -     Lipid Panel; Future  -     Vitamin B12 & Folate; Future  -     TSH+Free T4; Future  -     Lipase; Future             Follow Up     Return if symptoms worsen or fail to improve, for Next scheduled follow up, Recheck.  Patient was given instructions and counseling regarding her condition or for health maintenance advice. " Please see specific information pulled into the AVS if appropriate.

## 2024-02-29 NOTE — PROGRESS NOTES
Lipase was a little elevated like it could be pancreas causing pain this could be from some medicines given hold or cut back on metformin and or losartan to see if improves

## 2024-03-07 ENCOUNTER — CLINICAL SUPPORT (OUTPATIENT)
Dept: FAMILY MEDICINE CLINIC | Facility: CLINIC | Age: 64
End: 2024-03-07
Payer: MEDICARE

## 2024-03-07 DIAGNOSIS — J30.9 ALLERGIC RHINITIS, UNSPECIFIED SEASONALITY, UNSPECIFIED TRIGGER: Primary | ICD-10-CM

## 2024-03-07 PROCEDURE — 95117 IMMUNOTHERAPY INJECTIONS: CPT | Performed by: FAMILY MEDICINE

## 2024-03-07 NOTE — PROGRESS NOTES
..  History of Present Illness    Immunotherapy Pre-Injection Questionnaire:         Are you currently pregnant or been diagnosed with a new medical condition? No    1. Have you had increased asthmas symptoms (chest tightness, increased cough, wheezing, or shortness of breath) in the past week? No    2. Have you had increase allergy symptoms (itching eyes or nose, sneezing, runny nose, post-nasal drip, or throat-clearing) in the past week? No    3. Have you had a cold, respiratory tract infection, or flu-like symptoms in the past two weeks? No    4. Did you have any problems such as increased allergy or asthma symptoms, hives, or generalized itching within 12 hours or receiving your last injection? No    5. Are you on any new medications? New eye drops? No    6. Patient confirmed their name and birth date on allergy vials are correct. Yes     Assessment/Plan   Problems Addressed this Visit    None  Visit Diagnoses       Allergic rhinitis, unspecified seasonality, unspecified trigger    -  Primary    Relevant Medications    patient supplied allergy injection (Start on 3/7/2024 11:00 AM)    patient supplied allergy injection (Start on 3/7/2024 11:00 AM)          Diagnoses         Codes Comments    Allergic rhinitis, unspecified seasonality, unspecified trigger    -  Primary ICD-10-CM: J30.9  ICD-9-CM: 477.9           Diagnoses and all orders for this visit                Clemente Gaffney 3/7/2024

## 2024-03-13 ENCOUNTER — CLINICAL SUPPORT (OUTPATIENT)
Dept: FAMILY MEDICINE CLINIC | Facility: CLINIC | Age: 64
End: 2024-03-13
Payer: MEDICARE

## 2024-03-13 DIAGNOSIS — J30.9 ALLERGIC RHINITIS, UNSPECIFIED SEASONALITY, UNSPECIFIED TRIGGER: Primary | ICD-10-CM

## 2024-03-13 PROCEDURE — 95117 IMMUNOTHERAPY INJECTIONS: CPT | Performed by: FAMILY MEDICINE

## 2024-03-13 NOTE — PROGRESS NOTES
..  History of Present Illness    Immunotherapy Pre-Injection Questionnaire:         Are you currently pregnant or been diagnosed with a new medical condition? No    1. Have you had increased asthmas symptoms (chest tightness, increased cough, wheezing, or shortness of breath) in the past week? No    2. Have you had increase allergy symptoms (itching eyes or nose, sneezing, runny nose, post-nasal drip, or throat-clearing) in the past week? No    3. Have you had a cold, respiratory tract infection, or flu-like symptoms in the past two weeks? No    4. Did you have any problems such as increased allergy or asthma symptoms, hives, or generalized itching within 12 hours or receiving your last injection? No    5. Are you on any new medications? New eye drops? No    6. Patient confirmed their name and birth date on allergy vials are correct. No     Assessment/Plan   Diagnoses and all orders for this visit:    1. Allergic rhinitis, unspecified seasonality, unspecified trigger (Primary)      Diagnoses and all orders for this visit                Edna Humphrey 3/13/2024

## 2024-03-22 ENCOUNTER — CLINICAL SUPPORT (OUTPATIENT)
Dept: FAMILY MEDICINE CLINIC | Facility: CLINIC | Age: 64
End: 2024-03-22
Payer: MEDICARE

## 2024-03-22 DIAGNOSIS — J30.9 ALLERGIC RHINITIS, UNSPECIFIED SEASONALITY, UNSPECIFIED TRIGGER: Primary | ICD-10-CM

## 2024-03-22 PROCEDURE — 95117 IMMUNOTHERAPY INJECTIONS: CPT | Performed by: FAMILY MEDICINE

## 2024-03-27 ENCOUNTER — CLINICAL SUPPORT (OUTPATIENT)
Dept: FAMILY MEDICINE CLINIC | Facility: CLINIC | Age: 64
End: 2024-03-27
Payer: MEDICARE

## 2024-03-27 DIAGNOSIS — J30.9 ALLERGIC RHINITIS, UNSPECIFIED SEASONALITY, UNSPECIFIED TRIGGER: Primary | ICD-10-CM

## 2024-03-27 PROCEDURE — 95117 IMMUNOTHERAPY INJECTIONS: CPT | Performed by: FAMILY MEDICINE

## 2024-03-27 NOTE — PROGRESS NOTES
..  History of Present Illness    Immunotherapy Pre-Injection Questionnaire:         Are you currently pregnant or been diagnosed with a new medical condition? No    1. Have you had increased asthmas symptoms (chest tightness, increased cough, wheezing, or shortness of breath) in the past week? No    2. Have you had increase allergy symptoms (itching eyes or nose, sneezing, runny nose, post-nasal drip, or throat-clearing) in the past week? No    3. Have you had a cold, respiratory tract infection, or flu-like symptoms in the past two weeks? No    4. Did you have any problems such as increased allergy or asthma symptoms, hives, or generalized itching within 12 hours or receiving your last injection? No    5. Are you on any new medications? New eye drops? No    6. Patient confirmed their name and birth date on allergy vials are correct. Yes     Assessment/Plan   Problems Addressed this Visit    None  Visit Diagnoses       Allergic rhinitis, unspecified seasonality, unspecified trigger    -  Primary    Relevant Medications    patient supplied allergy injection (Start on 3/27/2024 12:15 PM)    patient supplied allergy injection (Start on 3/27/2024 12:15 PM)          Diagnoses         Codes Comments    Allergic rhinitis, unspecified seasonality, unspecified trigger    -  Primary ICD-10-CM: J30.9  ICD-9-CM: 477.9           Diagnoses and all orders for this visit                Clemente Gaffney 3/27/2024

## 2024-03-28 DIAGNOSIS — I10 PRIMARY HYPERTENSION: Chronic | ICD-10-CM

## 2024-03-28 RX ORDER — ATORVASTATIN CALCIUM 40 MG/1
40 TABLET, FILM COATED ORAL NIGHTLY
Qty: 90 TABLET | Refills: 0 | Status: SHIPPED | OUTPATIENT
Start: 2024-03-28

## 2024-04-08 ENCOUNTER — CLINICAL SUPPORT (OUTPATIENT)
Dept: FAMILY MEDICINE CLINIC | Facility: CLINIC | Age: 64
End: 2024-04-08
Payer: MEDICARE

## 2024-04-08 DIAGNOSIS — J30.9 ALLERGIC RHINITIS, UNSPECIFIED SEASONALITY, UNSPECIFIED TRIGGER: Primary | ICD-10-CM

## 2024-04-08 PROCEDURE — 95117 IMMUNOTHERAPY INJECTIONS: CPT | Performed by: FAMILY MEDICINE

## 2024-04-08 NOTE — PROGRESS NOTES
History of Present Illness    Immunotherapy Pre-Injection Questionnaire:         Are you currently pregnant or been diagnosed with a new medical condition? No    1. Have you had increased asthmas symptoms (chest tightness, increased cough, wheezing, or shortness of breath) in the past week? No    2. Have you had increase allergy symptoms (itching eyes or nose, sneezing, runny nose, post-nasal drip, or throat-clearing) in the past week? No    3. Have you had a cold, respiratory tract infection, or flu-like symptoms in the past two weeks? No    4. Did you have any problems such as increased allergy or asthma symptoms, hives, or generalized itching within 12 hours or receiving your last injection? No    5. Are you on any new medications? New eye drops? No    6. Patient confirmed their name and birth date on allergy vials are correct. Yes     Assessment/Plan   Diagnoses and all orders for this visit:    1. Allergic rhinitis, unspecified seasonality, unspecified trigger (Primary)  -     patient supplied allergy injection  -     patient supplied allergy injection      Diagnoses and all orders for this visit                Karely Siddiqui MA 4/8/2024

## 2024-04-15 ENCOUNTER — CLINICAL SUPPORT (OUTPATIENT)
Dept: FAMILY MEDICINE CLINIC | Facility: CLINIC | Age: 64
End: 2024-04-15
Payer: MEDICARE

## 2024-04-15 DIAGNOSIS — J30.9 ALLERGIC RHINITIS, UNSPECIFIED SEASONALITY, UNSPECIFIED TRIGGER: Primary | ICD-10-CM

## 2024-04-15 PROCEDURE — 95117 IMMUNOTHERAPY INJECTIONS: CPT | Performed by: FAMILY MEDICINE

## 2024-04-15 NOTE — PROGRESS NOTES

## 2024-04-22 ENCOUNTER — CLINICAL SUPPORT (OUTPATIENT)
Dept: FAMILY MEDICINE CLINIC | Facility: CLINIC | Age: 64
End: 2024-04-22
Payer: MEDICARE

## 2024-04-22 DIAGNOSIS — J30.9 ALLERGIC RHINITIS, UNSPECIFIED SEASONALITY, UNSPECIFIED TRIGGER: Primary | ICD-10-CM

## 2024-04-22 PROCEDURE — 95117 IMMUNOTHERAPY INJECTIONS: CPT | Performed by: FAMILY MEDICINE

## 2024-04-22 NOTE — PROGRESS NOTES
History of Present Illness    Immunotherapy Pre-Injection Questionnaire:         Are you currently pregnant or been diagnosed with a new medical condition? No    1. Have you had increased asthmas symptoms (chest tightness, increased cough, wheezing, or shortness of breath) in the past week? No    2. Have you had increase allergy symptoms (itching eyes or nose, sneezing, runny nose, post-nasal drip, or throat-clearing) in the past week? No    3. Have you had a cold, respiratory tract infection, or flu-like symptoms in the past two weeks? No    4. Did you have any problems such as increased allergy or asthma symptoms, hives, or generalized itching within 12 hours or receiving your last injection? No    5. Are you on any new medications? New eye drops? No    6. Patient confirmed their name and birth date on allergy vials are correct. Yes     Assessment/Plan   Diagnoses and all orders for this visit:    1. Allergic rhinitis, unspecified seasonality, unspecified trigger (Primary)  -     patient supplied allergy injection  -     patient supplied allergy injection      Diagnoses and all orders for this visit                Karely Siddiqui MA 4/22/2024

## 2024-04-26 DIAGNOSIS — F51.01 PRIMARY INSOMNIA: Chronic | ICD-10-CM

## 2024-04-26 NOTE — TELEPHONE ENCOUNTER
LAST APPOINTMENT: 02/28/2024  NEXT APPOINTMENT: N/A   LAST UDS: 10/18/2023  LAST CONTROLLED SUBSTANCE AGREEMENT: 11/07/2023

## 2024-04-29 ENCOUNTER — CLINICAL SUPPORT (OUTPATIENT)
Dept: FAMILY MEDICINE CLINIC | Facility: CLINIC | Age: 64
End: 2024-04-29
Payer: MEDICARE

## 2024-04-29 DIAGNOSIS — J30.9 ALLERGIC RHINITIS, UNSPECIFIED SEASONALITY, UNSPECIFIED TRIGGER: Primary | ICD-10-CM

## 2024-04-29 PROCEDURE — 95117 IMMUNOTHERAPY INJECTIONS: CPT | Performed by: FAMILY MEDICINE

## 2024-04-29 RX ORDER — TEMAZEPAM 30 MG/1
CAPSULE ORAL
Qty: 30 CAPSULE | Refills: 4 | Status: SHIPPED | OUTPATIENT
Start: 2024-04-29

## 2024-04-29 NOTE — PROGRESS NOTES
History of Present Illness    Immunotherapy Pre-Injection Questionnaire:         Are you currently pregnant or been diagnosed with a new medical condition? No    1. Have you had increased asthmas symptoms (chest tightness, increased cough, wheezing, or shortness of breath) in the past week? No    2. Have you had increase allergy symptoms (itching eyes or nose, sneezing, runny nose, post-nasal drip, or throat-clearing) in the past week? No    3. Have you had a cold, respiratory tract infection, or flu-like symptoms in the past two weeks? No    4. Did you have any problems such as increased allergy or asthma symptoms, hives, or generalized itching within 12 hours or receiving your last injection? No    5. Are you on any new medications? New eye drops? No    6. Patient confirmed their name and birth date on allergy vials are correct. Yes     Assessment/Plan   Diagnoses and all orders for this visit:    1. Allergic rhinitis, unspecified seasonality, unspecified trigger (Primary)  -     patient supplied allergy injection  -     patient supplied allergy injection      Diagnoses and all orders for this visit                Karely Siddiqui MA 4/29/2024

## 2024-05-02 ENCOUNTER — CLINICAL SUPPORT (OUTPATIENT)
Dept: FAMILY MEDICINE CLINIC | Facility: CLINIC | Age: 64
End: 2024-05-02
Payer: MEDICARE

## 2024-05-02 DIAGNOSIS — J30.9 ALLERGIC RHINITIS, UNSPECIFIED SEASONALITY, UNSPECIFIED TRIGGER: Primary | ICD-10-CM

## 2024-05-02 PROCEDURE — 95117 IMMUNOTHERAPY INJECTIONS: CPT | Performed by: FAMILY MEDICINE

## 2024-05-02 NOTE — PROGRESS NOTES
History of Present Illness    Immunotherapy Pre-Injection Questionnaire:         Are you currently pregnant or been diagnosed with a new medical condition? No    1. Have you had increased asthmas symptoms (chest tightness, increased cough, wheezing, or shortness of breath) in the past week? No    2. Have you had increase allergy symptoms (itching eyes or nose, sneezing, runny nose, post-nasal drip, or throat-clearing) in the past week? No    3. Have you had a cold, respiratory tract infection, or flu-like symptoms in the past two weeks? No    4. Did you have any problems such as increased allergy or asthma symptoms, hives, or generalized itching within 12 hours or receiving your last injection? No    5. Are you on any new medications? New eye drops? No    6. Patient confirmed their name and birth date on allergy vials are correct. Yes     Assessment/Plan   Diagnoses and all orders for this visit:    1. Allergic rhinitis, unspecified seasonality, unspecified trigger (Primary)  -     patient supplied allergy injection  -     patient supplied allergy injection      Diagnoses and all orders for this visit                Karely Siddiqui MA 5/2/2024

## 2024-05-10 ENCOUNTER — CLINICAL SUPPORT (OUTPATIENT)
Dept: FAMILY MEDICINE CLINIC | Facility: CLINIC | Age: 64
End: 2024-05-10
Payer: MEDICARE

## 2024-05-10 ENCOUNTER — PROCEDURE VISIT (OUTPATIENT)
Dept: OBSTETRICS AND GYNECOLOGY | Facility: CLINIC | Age: 64
End: 2024-05-10
Payer: MEDICARE

## 2024-05-10 DIAGNOSIS — Z12.31 VISIT FOR SCREENING MAMMOGRAM: Primary | ICD-10-CM

## 2024-05-10 DIAGNOSIS — J30.9 ALLERGIC RHINITIS, UNSPECIFIED SEASONALITY, UNSPECIFIED TRIGGER: Primary | ICD-10-CM

## 2024-05-10 PROCEDURE — 95117 IMMUNOTHERAPY INJECTIONS: CPT | Performed by: FAMILY MEDICINE

## 2024-05-16 ENCOUNTER — OFFICE VISIT (OUTPATIENT)
Dept: FAMILY MEDICINE CLINIC | Facility: CLINIC | Age: 64
End: 2024-05-16
Payer: MEDICARE

## 2024-05-16 VITALS
BODY MASS INDEX: 22.81 KG/M2 | TEMPERATURE: 97.1 F | HEART RATE: 75 BPM | SYSTOLIC BLOOD PRESSURE: 139 MMHG | OXYGEN SATURATION: 98 % | HEIGHT: 64 IN | DIASTOLIC BLOOD PRESSURE: 63 MMHG | RESPIRATION RATE: 16 BRPM | WEIGHT: 133.6 LBS

## 2024-05-16 DIAGNOSIS — E53.8 B12 DEFICIENCY: ICD-10-CM

## 2024-05-16 DIAGNOSIS — J01.10 ACUTE NON-RECURRENT FRONTAL SINUSITIS: Primary | ICD-10-CM

## 2024-05-16 PROCEDURE — 99213 OFFICE O/P EST LOW 20 MIN: CPT | Performed by: FAMILY MEDICINE

## 2024-05-16 PROCEDURE — 1125F AMNT PAIN NOTED PAIN PRSNT: CPT | Performed by: FAMILY MEDICINE

## 2024-05-16 PROCEDURE — 96372 THER/PROPH/DIAG INJ SC/IM: CPT | Performed by: FAMILY MEDICINE

## 2024-05-16 PROCEDURE — 3078F DIAST BP <80 MM HG: CPT | Performed by: FAMILY MEDICINE

## 2024-05-16 PROCEDURE — 3044F HG A1C LEVEL LT 7.0%: CPT | Performed by: FAMILY MEDICINE

## 2024-05-16 PROCEDURE — 3075F SYST BP GE 130 - 139MM HG: CPT | Performed by: FAMILY MEDICINE

## 2024-05-16 RX ORDER — AMOXICILLIN 500 MG/1
500 CAPSULE ORAL 2 TIMES DAILY
Qty: 20 CAPSULE | Refills: 0 | Status: SHIPPED | OUTPATIENT
Start: 2024-05-16 | End: 2024-05-26

## 2024-05-16 RX ORDER — CYANOCOBALAMIN 1000 UG/ML
1000 INJECTION, SOLUTION INTRAMUSCULAR; SUBCUTANEOUS
Status: SHIPPED | OUTPATIENT
Start: 2024-05-16 | End: 2024-06-13

## 2024-05-16 RX ADMIN — CYANOCOBALAMIN 1000 MCG: 1000 INJECTION, SOLUTION INTRAMUSCULAR; SUBCUTANEOUS at 13:22

## 2024-05-21 ENCOUNTER — LAB (OUTPATIENT)
Dept: LAB | Facility: HOSPITAL | Age: 64
End: 2024-05-21
Payer: MEDICARE

## 2024-05-21 ENCOUNTER — CLINICAL SUPPORT (OUTPATIENT)
Dept: FAMILY MEDICINE CLINIC | Facility: CLINIC | Age: 64
End: 2024-05-21
Payer: MEDICARE

## 2024-05-21 DIAGNOSIS — F51.01 PRIMARY INSOMNIA: ICD-10-CM

## 2024-05-21 DIAGNOSIS — E53.8 B12 DEFICIENCY: ICD-10-CM

## 2024-05-21 DIAGNOSIS — J30.9 ALLERGIC RHINITIS, UNSPECIFIED SEASONALITY, UNSPECIFIED TRIGGER: Primary | ICD-10-CM

## 2024-05-21 DIAGNOSIS — E11.9 TYPE 2 DIABETES MELLITUS WITHOUT COMPLICATION, WITHOUT LONG-TERM CURRENT USE OF INSULIN: ICD-10-CM

## 2024-05-21 DIAGNOSIS — R11.0 NAUSEA: ICD-10-CM

## 2024-05-21 LAB
ALBUMIN SERPL-MCNC: 4.5 G/DL (ref 3.5–5.2)
ALBUMIN/GLOB SERPL: 2 G/DL
ALP SERPL-CCNC: 88 U/L (ref 39–117)
ALT SERPL W P-5'-P-CCNC: 11 U/L (ref 1–33)
ANION GAP SERPL CALCULATED.3IONS-SCNC: 12.7 MMOL/L (ref 5–15)
AST SERPL-CCNC: 17 U/L (ref 1–32)
BILIRUB SERPL-MCNC: 0.4 MG/DL (ref 0–1.2)
BUN SERPL-MCNC: 10 MG/DL (ref 8–23)
BUN/CREAT SERPL: 14.9 (ref 7–25)
CALCIUM SPEC-SCNC: 9.7 MG/DL (ref 8.6–10.5)
CHLORIDE SERPL-SCNC: 101 MMOL/L (ref 98–107)
CHOLEST SERPL-MCNC: 124 MG/DL (ref 0–200)
CO2 SERPL-SCNC: 28.3 MMOL/L (ref 22–29)
CREAT SERPL-MCNC: 0.67 MG/DL (ref 0.57–1)
EGFRCR SERPLBLD CKD-EPI 2021: 96.5 ML/MIN/1.73
FOLATE SERPL-MCNC: >20 NG/ML (ref 4.78–24.2)
GLOBULIN UR ELPH-MCNC: 2.3 GM/DL
GLUCOSE SERPL-MCNC: 126 MG/DL (ref 65–99)
HDLC SERPL-MCNC: 46 MG/DL (ref 40–60)
LDLC SERPL CALC-MCNC: 56 MG/DL (ref 0–100)
LDLC/HDLC SERPL: 1.17 {RATIO}
POTASSIUM SERPL-SCNC: 4 MMOL/L (ref 3.5–5.2)
PROT SERPL-MCNC: 6.8 G/DL (ref 6–8.5)
SODIUM SERPL-SCNC: 142 MMOL/L (ref 136–145)
TRIGL SERPL-MCNC: 121 MG/DL (ref 0–150)
VIT B12 BLD-MCNC: >2000 PG/ML (ref 211–946)
VLDLC SERPL-MCNC: 22 MG/DL (ref 5–40)

## 2024-05-21 PROCEDURE — 80053 COMPREHEN METABOLIC PANEL: CPT

## 2024-05-21 PROCEDURE — 96372 THER/PROPH/DIAG INJ SC/IM: CPT | Performed by: FAMILY MEDICINE

## 2024-05-21 PROCEDURE — 36415 COLL VENOUS BLD VENIPUNCTURE: CPT

## 2024-05-21 PROCEDURE — 82607 VITAMIN B-12: CPT

## 2024-05-21 PROCEDURE — 95117 IMMUNOTHERAPY INJECTIONS: CPT | Performed by: FAMILY MEDICINE

## 2024-05-21 PROCEDURE — 82746 ASSAY OF FOLIC ACID SERUM: CPT

## 2024-05-21 PROCEDURE — 80061 LIPID PANEL: CPT

## 2024-05-21 RX ADMIN — CYANOCOBALAMIN 1000 MCG: 1000 INJECTION, SOLUTION INTRAMUSCULAR; SUBCUTANEOUS at 16:09

## 2024-05-21 NOTE — PROGRESS NOTES
History of Present Illness    Immunotherapy Pre-Injection Questionnaire:         Are you currently pregnant or been diagnosed with a new medical condition? No    1. Have you had increased asthmas symptoms (chest tightness, increased cough, wheezing, or shortness of breath) in the past week? No    2. Have you had increase allergy symptoms (itching eyes or nose, sneezing, runny nose, post-nasal drip, or throat-clearing) in the past week? No    3. Have you had a cold, respiratory tract infection, or flu-like symptoms in the past two weeks? No    4. Did you have any problems such as increased allergy or asthma symptoms, hives, or generalized itching within 12 hours or receiving your last injection? No    5. Are you on any new medications? New eye drops? No    6. Patient confirmed their name and birth date on allergy vials are correct. Yes     Assessment/Plan   Diagnoses and all orders for this visit:    1. Allergic rhinitis, unspecified seasonality, unspecified trigger (Primary)  -     patient supplied allergy injection  -     patient supplied allergy injection    2. B12 deficiency      Diagnoses and all orders for this visit                Karely Siddiqui MA 5/21/2024

## 2024-05-23 NOTE — PROGRESS NOTES
"Chief Complaint  Allergies (X3 days) and Earache    Subjective          Consuelo Carlson presents to [facility]  Allergies    Earache         Patient with cough congestion earache no fever or sick contacts    Objective   Vital Signs:   /63 (BP Location: Left arm, Patient Position: Sitting, Cuff Size: Adult)   Pulse 75   Temp 97.1 °F (36.2 °C)   Resp 16   Ht 162.6 cm (64\")   Wt 60.6 kg (133 lb 9.6 oz)   SpO2 98%   BMI 22.93 kg/m²     BMI is within normal parameters. No other follow-up for BMI required.      Physical Exam  Vitals reviewed.   Constitutional:       Appearance: Normal appearance. She is well-developed.   HENT:      Head: Normocephalic and atraumatic.      Right Ear: External ear normal.      Left Ear: External ear normal.      Nose: Nose normal. Congestion and rhinorrhea present.   Eyes:      Conjunctiva/sclera: Conjunctivae normal.      Pupils: Pupils are equal, round, and reactive to light.   Cardiovascular:      Rate and Rhythm: Normal rate.   Pulmonary:      Effort: Pulmonary effort is normal.      Breath sounds: Normal breath sounds.   Abdominal:      General: There is no distension.   Skin:     General: Skin is warm and dry.   Neurological:      Mental Status: She is alert and oriented to person, place, and time. Mental status is at baseline.   Psychiatric:         Mood and Affect: Mood and affect normal.         Behavior: Behavior normal.         Thought Content: Thought content normal.         Judgment: Judgment normal.          Result Review :   The following data was reviewed by: Paul Choi DO on 05/16/2024:  Common labs          2/13/2024    08:27 2/28/2024    13:29 5/21/2024    16:15   Common Labs   Glucose   126    BUN   10    Creatinine   0.67    Sodium   142    Potassium   4.0    Chloride   101    Calcium   9.7    Albumin   4.5    Total Bilirubin   0.4    Alkaline Phosphatase   88    AST (SGOT)   17    ALT (SGPT)   11    WBC  5.73   "   Hemoglobin  13.7     Hematocrit  40.6     Platelets  148     Total Cholesterol   124    Triglycerides   121    HDL Cholesterol   46    LDL Cholesterol    56    Hemoglobin A1C 6                      Assessment and Plan    Diagnoses and all orders for this visit:    1. Acute non-recurrent frontal sinusitis (Primary)    2. B12 deficiency  -     cyanocobalamin injection 1,000 mcg    Other orders  -     amoxicillin (AMOXIL) 500 MG capsule; Take 1 capsule by mouth 2 (Two) Times a Day for 10 days.  Dispense: 20 capsule; Refill: 0      Sinus pain pressure  Treat with antibiotics  Continue allergy med and over the counter meds to help  Fu chronic conditions and other as scheduled      Follow Up   No follow-ups on file.  Patient was given instructions and counseling regarding her condition or for health maintenance advice. Please see specific information pulled into the AVS if appropriate.     Transcribed from ambient dictation for Paul Choi DO by Paul Choi DO.  05/23/24   09:01 EDT

## 2024-05-29 ENCOUNTER — CLINICAL SUPPORT (OUTPATIENT)
Dept: FAMILY MEDICINE CLINIC | Facility: CLINIC | Age: 64
End: 2024-05-29
Payer: MEDICARE

## 2024-05-29 DIAGNOSIS — J30.9 ALLERGIC RHINITIS, UNSPECIFIED SEASONALITY, UNSPECIFIED TRIGGER: Primary | ICD-10-CM

## 2024-05-29 PROCEDURE — 96372 THER/PROPH/DIAG INJ SC/IM: CPT | Performed by: FAMILY MEDICINE

## 2024-05-29 PROCEDURE — 95117 IMMUNOTHERAPY INJECTIONS: CPT | Performed by: FAMILY MEDICINE

## 2024-05-29 RX ADMIN — CYANOCOBALAMIN 1000 MCG: 1000 INJECTION, SOLUTION INTRAMUSCULAR; SUBCUTANEOUS at 11:37

## 2024-05-29 NOTE — PROGRESS NOTES
History of Present Illness    Immunotherapy Pre-Injection Questionnaire:         Are you currently pregnant or been diagnosed with a new medical condition? No    1. Have you had increased asthmas symptoms (chest tightness, increased cough, wheezing, or shortness of breath) in the past week? No    2. Have you had increase allergy symptoms (itching eyes or nose, sneezing, runny nose, post-nasal drip, or throat-clearing) in the past week? No    3. Have you had a cold, respiratory tract infection, or flu-like symptoms in the past two weeks? No    4. Did you have any problems such as increased allergy or asthma symptoms, hives, or generalized itching within 12 hours or receiving your last injection? No    5. Are you on any new medications? New eye drops? No    6. Patient confirmed their name and birth date on allergy vials are correct. YES     Assessment/Plan   Diagnoses and all orders for this visit:    1. Allergic rhinitis, unspecified seasonality, unspecified trigger (Primary)  -     patient supplied allergy injection  -     patient supplied allergy injection      Diagnoses and all orders for this visit                Luis Miguel Law MA 5/29/2024

## 2024-06-04 ENCOUNTER — CLINICAL SUPPORT (OUTPATIENT)
Dept: FAMILY MEDICINE CLINIC | Facility: CLINIC | Age: 64
End: 2024-06-04
Payer: MEDICARE

## 2024-06-04 DIAGNOSIS — J30.9 ALLERGIC RHINITIS, UNSPECIFIED SEASONALITY, UNSPECIFIED TRIGGER: Primary | ICD-10-CM

## 2024-06-04 PROCEDURE — 96372 THER/PROPH/DIAG INJ SC/IM: CPT | Performed by: FAMILY MEDICINE

## 2024-06-04 PROCEDURE — 95117 IMMUNOTHERAPY INJECTIONS: CPT | Performed by: FAMILY MEDICINE

## 2024-06-04 RX ADMIN — CYANOCOBALAMIN 1000 MCG: 1000 INJECTION, SOLUTION INTRAMUSCULAR; SUBCUTANEOUS at 14:48

## 2024-06-04 NOTE — PROGRESS NOTES
..  History of Present Illness    Immunotherapy Pre-Injection Questionnaire:         Are you currently pregnant or been diagnosed with a new medical condition? No    1. Have you had increased asthmas symptoms (chest tightness, increased cough, wheezing, or shortness of breath) in the past week? No    2. Have you had increase allergy symptoms (itching eyes or nose, sneezing, runny nose, post-nasal drip, or throat-clearing) in the past week? No    3. Have you had a cold, respiratory tract infection, or flu-like symptoms in the past two weeks? No    4. Did you have any problems such as increased allergy or asthma symptoms, hives, or generalized itching within 12 hours or receiving your last injection? No    5. Are you on any new medications? New eye drops? No    6. Patient confirmed their name and birth date on allergy vials are correct. Yes     Assessment/Plan   Problems Addressed this Visit    None  Visit Diagnoses       Allergic rhinitis, unspecified seasonality, unspecified trigger    -  Primary    Relevant Medications    patient supplied allergy injection (Start on 6/4/2024  3:30 PM)    patient supplied allergy injection (Start on 6/4/2024  3:30 PM)          Diagnoses         Codes Comments    Allergic rhinitis, unspecified seasonality, unspecified trigger    -  Primary ICD-10-CM: J30.9  ICD-9-CM: 477.9           Diagnoses and all orders for this visit                Clemente Gaffney 6/4/2024

## 2024-06-14 ENCOUNTER — CLINICAL SUPPORT (OUTPATIENT)
Dept: FAMILY MEDICINE CLINIC | Facility: CLINIC | Age: 64
End: 2024-06-14
Payer: MEDICARE

## 2024-06-14 DIAGNOSIS — J30.9 ALLERGIC RHINITIS, UNSPECIFIED SEASONALITY, UNSPECIFIED TRIGGER: Primary | ICD-10-CM

## 2024-06-14 PROCEDURE — 95117 IMMUNOTHERAPY INJECTIONS: CPT | Performed by: FAMILY MEDICINE

## 2024-06-14 RX ADMIN — CYANOCOBALAMIN 1000 MCG: 1000 INJECTION, SOLUTION INTRAMUSCULAR; SUBCUTANEOUS at 13:46

## 2024-06-14 NOTE — PROGRESS NOTES
..  History of Present Illness    Immunotherapy Pre-Injection Questionnaire:         Are you currently pregnant or been diagnosed with a new medical condition? No    1. Have you had increased asthmas symptoms (chest tightness, increased cough, wheezing, or shortness of breath) in the past week? No    2. Have you had increase allergy symptoms (itching eyes or nose, sneezing, runny nose, post-nasal drip, or throat-clearing) in the past week? No    3. Have you had a cold, respiratory tract infection, or flu-like symptoms in the past two weeks? No    4. Did you have any problems such as increased allergy or asthma symptoms, hives, or generalized itching within 12 hours or receiving your last injection? No    5. Are you on any new medications? New eye drops? No    6. Patient confirmed their name and birth date on allergy vials are correct. Yes     Assessment/Plan   Problems Addressed this Visit    None  Visit Diagnoses       Allergic rhinitis, unspecified seasonality, unspecified trigger    -  Primary    Relevant Medications    patient supplied allergy injection (Completed) (Start on 6/14/2024 11:15 AM)    patient supplied allergy injection (Completed) (Start on 6/14/2024 11:15 AM)          Diagnoses         Codes Comments    Allergic rhinitis, unspecified seasonality, unspecified trigger    -  Primary ICD-10-CM: J30.9  ICD-9-CM: 477.9           Diagnoses and all orders for this visit                Clemente Gaffney 6/14/2024

## 2024-06-25 ENCOUNTER — CLINICAL SUPPORT (OUTPATIENT)
Dept: FAMILY MEDICINE CLINIC | Facility: CLINIC | Age: 64
End: 2024-06-25
Payer: MEDICARE

## 2024-06-25 DIAGNOSIS — J30.9 ALLERGIC RHINITIS, UNSPECIFIED SEASONALITY, UNSPECIFIED TRIGGER: Primary | ICD-10-CM

## 2024-06-25 DIAGNOSIS — E53.8 B12 DEFICIENCY: Primary | ICD-10-CM

## 2024-06-25 PROCEDURE — 96372 THER/PROPH/DIAG INJ SC/IM: CPT | Performed by: FAMILY MEDICINE

## 2024-06-25 PROCEDURE — 95117 IMMUNOTHERAPY INJECTIONS: CPT | Performed by: FAMILY MEDICINE

## 2024-06-25 RX ORDER — CYANOCOBALAMIN 1000 UG/ML
1000 INJECTION, SOLUTION INTRAMUSCULAR; SUBCUTANEOUS
Status: SHIPPED | OUTPATIENT
Start: 2024-06-25 | End: 2024-09-24

## 2024-06-25 RX ADMIN — CYANOCOBALAMIN 1000 MCG: 1000 INJECTION, SOLUTION INTRAMUSCULAR; SUBCUTANEOUS at 14:14

## 2024-06-25 NOTE — PROGRESS NOTES
..  History of Present Illness    Immunotherapy Pre-Injection Questionnaire:         Are you currently pregnant or been diagnosed with a new medical condition? No    1. Have you had increased asthmas symptoms (chest tightness, increased cough, wheezing, or shortness of breath) in the past week? No    2. Have you had increase allergy symptoms (itching eyes or nose, sneezing, runny nose, post-nasal drip, or throat-clearing) in the past week? No    3. Have you had a cold, respiratory tract infection, or flu-like symptoms in the past two weeks? No    4. Did you have any problems such as increased allergy or asthma symptoms, hives, or generalized itching within 12 hours or receiving your last injection? No    5. Are you on any new medications? New eye drops? No    6. Patient confirmed their name and birth date on allergy vials are correct. Yes     Assessment/Plan   Problems Addressed this Visit    None  Visit Diagnoses       Allergic rhinitis, unspecified seasonality, unspecified trigger    -  Primary    Relevant Medications    patient supplied allergy injection (Start on 6/25/2024  3:00 PM)    patient supplied allergy injection (Start on 6/25/2024  3:00 PM)          Diagnoses         Codes Comments    Allergic rhinitis, unspecified seasonality, unspecified trigger    -  Primary ICD-10-CM: J30.9  ICD-9-CM: 477.9           Diagnoses and all orders for this visit                Clemente Gaffney 6/25/2024

## 2024-07-01 DIAGNOSIS — I10 PRIMARY HYPERTENSION: Chronic | ICD-10-CM

## 2024-07-01 RX ORDER — ATORVASTATIN CALCIUM 40 MG/1
40 TABLET, FILM COATED ORAL NIGHTLY
Qty: 90 TABLET | Refills: 0 | Status: SHIPPED | OUTPATIENT
Start: 2024-07-01

## 2024-07-05 ENCOUNTER — CLINICAL SUPPORT (OUTPATIENT)
Dept: FAMILY MEDICINE CLINIC | Facility: CLINIC | Age: 64
End: 2024-07-05
Payer: MEDICARE

## 2024-07-05 DIAGNOSIS — J30.9 ALLERGIC RHINITIS, UNSPECIFIED SEASONALITY, UNSPECIFIED TRIGGER: Primary | ICD-10-CM

## 2024-07-05 PROCEDURE — 95117 IMMUNOTHERAPY INJECTIONS: CPT | Performed by: FAMILY MEDICINE

## 2024-07-05 NOTE — PROGRESS NOTES
..  History of Present Illness    Immunotherapy Pre-Injection Questionnaire:         Are you currently pregnant or been diagnosed with a new medical condition? No    1. Have you had increased asthmas symptoms (chest tightness, increased cough, wheezing, or shortness of breath) in the past week? No    2. Have you had increase allergy symptoms (itching eyes or nose, sneezing, runny nose, post-nasal drip, or throat-clearing) in the past week? No    3. Have you had a cold, respiratory tract infection, or flu-like symptoms in the past two weeks? No    4. Did you have any problems such as increased allergy or asthma symptoms, hives, or generalized itching within 12 hours or receiving your last injection? No    5. Are you on any new medications? New eye drops? No    6. Patient confirmed their name and birth date on allergy vials are correct. Yes     Assessment/Plan   Problems Addressed this Visit    None  Visit Diagnoses       Allergic rhinitis, unspecified seasonality, unspecified trigger    -  Primary    Relevant Medications    patient supplied allergy injection (Start on 7/5/2024 10:45 AM)    patient supplied allergy injection (Start on 7/5/2024 10:45 AM)          Diagnoses         Codes Comments    Allergic rhinitis, unspecified seasonality, unspecified trigger    -  Primary ICD-10-CM: J30.9  ICD-9-CM: 477.9           Diagnoses and all orders for this visit                Clemente Gaffney 7/5/2024

## 2024-07-08 ENCOUNTER — CLINICAL SUPPORT (OUTPATIENT)
Dept: FAMILY MEDICINE CLINIC | Facility: CLINIC | Age: 64
End: 2024-07-08
Payer: MEDICARE

## 2024-07-08 DIAGNOSIS — J30.9 ALLERGIC RHINITIS, UNSPECIFIED SEASONALITY, UNSPECIFIED TRIGGER: Primary | ICD-10-CM

## 2024-07-08 PROCEDURE — 95117 IMMUNOTHERAPY INJECTIONS: CPT | Performed by: FAMILY MEDICINE

## 2024-07-15 ENCOUNTER — CLINICAL SUPPORT (OUTPATIENT)
Dept: FAMILY MEDICINE CLINIC | Facility: CLINIC | Age: 64
End: 2024-07-15
Payer: MEDICARE

## 2024-07-15 DIAGNOSIS — J30.9 ALLERGIC RHINITIS, UNSPECIFIED SEASONALITY, UNSPECIFIED TRIGGER: Primary | ICD-10-CM

## 2024-07-15 PROCEDURE — 95117 IMMUNOTHERAPY INJECTIONS: CPT | Performed by: FAMILY MEDICINE

## 2024-07-15 NOTE — PROGRESS NOTES
..  History of Present Illness    Immunotherapy Pre-Injection Questionnaire:         Are you currently pregnant or been diagnosed with a new medical condition? No    1. Have you had increased asthmas symptoms (chest tightness, increased cough, wheezing, or shortness of breath) in the past week? No    2. Have you had increase allergy symptoms (itching eyes or nose, sneezing, runny nose, post-nasal drip, or throat-clearing) in the past week? No    3. Have you had a cold, respiratory tract infection, or flu-like symptoms in the past two weeks? No    4. Did you have any problems such as increased allergy or asthma symptoms, hives, or generalized itching within 12 hours or receiving your last injection? No    5. Are you on any new medications? New eye drops? No    6. Patient confirmed their name and birth date on allergy vials are correct. Yes     Assessment/Plan   Problems Addressed this Visit    None  Visit Diagnoses       Allergic rhinitis, unspecified seasonality, unspecified trigger    -  Primary    Relevant Medications    patient supplied allergy injection (Completed) (Start on 7/15/2024  1:00 PM)    patient supplied allergy injection (Completed) (Start on 7/15/2024  1:00 PM)          Diagnoses         Codes Comments    Allergic rhinitis, unspecified seasonality, unspecified trigger    -  Primary ICD-10-CM: J30.9  ICD-9-CM: 477.9           Diagnoses and all orders for this visit                Clemente Gaffney 7/15/2024

## 2024-07-26 ENCOUNTER — CLINICAL SUPPORT (OUTPATIENT)
Dept: FAMILY MEDICINE CLINIC | Facility: CLINIC | Age: 64
End: 2024-07-26
Payer: MEDICARE

## 2024-07-26 DIAGNOSIS — J30.9 ALLERGIC RHINITIS, UNSPECIFIED SEASONALITY, UNSPECIFIED TRIGGER: Primary | ICD-10-CM

## 2024-07-26 DIAGNOSIS — I10 PRIMARY HYPERTENSION: Chronic | ICD-10-CM

## 2024-07-26 PROCEDURE — 95117 IMMUNOTHERAPY INJECTIONS: CPT | Performed by: FAMILY MEDICINE

## 2024-07-26 RX ORDER — METOPROLOL SUCCINATE 50 MG/1
TABLET, EXTENDED RELEASE ORAL
Qty: 90 TABLET | Refills: 1 | Status: SHIPPED | OUTPATIENT
Start: 2024-07-26

## 2024-07-26 NOTE — PROGRESS NOTES
History of Present Illness    Immunotherapy Pre-Injection Questionnaire:         Are you currently pregnant or been diagnosed with a new medical condition? No    1. Have you had increased asthmas symptoms (chest tightness, increased cough, wheezing, or shortness of breath) in the past week? No    2. Have you had increase allergy symptoms (itching eyes or nose, sneezing, runny nose, post-nasal drip, or throat-clearing) in the past week? No    3. Have you had a cold, respiratory tract infection, or flu-like symptoms in the past two weeks? No    4. Did you have any problems such as increased allergy or asthma symptoms, hives, or generalized itching within 12 hours or receiving your last injection? No    5. Are you on any new medications? New eye drops? No    6. Patient confirmed their name and birth date on allergy vials are correct. Yes     Assessment/Plan   Diagnoses and all orders for this visit:    1. Allergic rhinitis, unspecified seasonality, unspecified trigger (Primary)  -     patient supplied allergy injection  -     patient supplied allergy injection      Diagnoses and all orders for this visit      Patient tolerated well, no reaction after 30 min    Rashmi Reno RN 7/26/2024

## 2024-08-01 ENCOUNTER — CLINICAL SUPPORT (OUTPATIENT)
Dept: FAMILY MEDICINE CLINIC | Facility: CLINIC | Age: 64
End: 2024-08-01
Payer: MEDICARE

## 2024-08-01 DIAGNOSIS — J30.9 ALLERGIC RHINITIS, UNSPECIFIED SEASONALITY, UNSPECIFIED TRIGGER: Primary | ICD-10-CM

## 2024-08-01 PROCEDURE — 95117 IMMUNOTHERAPY INJECTIONS: CPT | Performed by: FAMILY MEDICINE

## 2024-08-01 NOTE — PROGRESS NOTES
..  History of Present Illness    Immunotherapy Pre-Injection Questionnaire:         Are you currently pregnant or been diagnosed with a new medical condition? No    1. Have you had increased asthmas symptoms (chest tightness, increased cough, wheezing, or shortness of breath) in the past week? No    2. Have you had increase allergy symptoms (itching eyes or nose, sneezing, runny nose, post-nasal drip, or throat-clearing) in the past week? No    3. Have you had a cold, respiratory tract infection, or flu-like symptoms in the past two weeks? No    4. Did you have any problems such as increased allergy or asthma symptoms, hives, or generalized itching within 12 hours or receiving your last injection? No    5. Are you on any new medications? New eye drops? No    6. Patient confirmed their name and birth date on allergy vials are correct. Yes     Assessment/Plan   Problems Addressed this Visit    None  Visit Diagnoses       Allergic rhinitis, unspecified seasonality, unspecified trigger    -  Primary    Relevant Medications    patient supplied allergy injection (Start on 8/1/2024  4:00 PM)    patient supplied allergy injection (Start on 8/1/2024  4:00 PM)          Diagnoses         Codes Comments    Allergic rhinitis, unspecified seasonality, unspecified trigger    -  Primary ICD-10-CM: J30.9  ICD-9-CM: 477.9           Diagnoses and all orders for this visit                Clemente Gaffney 8/1/2024

## 2024-08-07 DIAGNOSIS — F32.5 MAJOR DEPRESSIVE DISORDER IN FULL REMISSION, UNSPECIFIED WHETHER RECURRENT: Chronic | ICD-10-CM

## 2024-08-07 DIAGNOSIS — F51.01 PRIMARY INSOMNIA: Chronic | ICD-10-CM

## 2024-08-07 RX ORDER — VENLAFAXINE HYDROCHLORIDE 150 MG/1
150 CAPSULE, EXTENDED RELEASE ORAL DAILY
Qty: 90 CAPSULE | Refills: 1 | Status: SHIPPED | OUTPATIENT
Start: 2024-08-07

## 2024-08-09 ENCOUNTER — CLINICAL SUPPORT (OUTPATIENT)
Dept: FAMILY MEDICINE CLINIC | Facility: CLINIC | Age: 64
End: 2024-08-09
Payer: MEDICARE

## 2024-08-09 DIAGNOSIS — J30.9 ALLERGIC RHINITIS, UNSPECIFIED SEASONALITY, UNSPECIFIED TRIGGER: Primary | ICD-10-CM

## 2024-08-09 PROCEDURE — 95117 IMMUNOTHERAPY INJECTIONS: CPT | Performed by: FAMILY MEDICINE

## 2024-08-09 NOTE — PROGRESS NOTES
.  History of Present Illness    Immunotherapy Pre-Injection Questionnaire:         Are you currently pregnant or been diagnosed with a new medical condition? No    1. Have you had increased asthmas symptoms (chest tightness, increased cough, wheezing, or shortness of breath) in the past week? No    2. Have you had increase allergy symptoms (itching eyes or nose, sneezing, runny nose, post-nasal drip, or throat-clearing) in the past week? No    3. Have you had a cold, respiratory tract infection, or flu-like symptoms in the past two weeks? No    4. Did you have any problems such as increased allergy or asthma symptoms, hives, or generalized itching within 12 hours or receiving your last injection? No    5. Are you on any new medications? New eye drops? No    6. Patient confirmed their name and birth date on allergy vials are correct. Yes     Assessment/Plan      Diagnoses and all orders for this visit:    1. Allergic rhinitis, unspecified seasonality, unspecified trigger (Primary)  -     patient supplied allergy injection  -     patient supplied allergy injection      Diagnoses and all orders for this visit                Veronica Villa MA 8/9/2024

## 2024-08-12 RX ORDER — LIDOCAINE 50 MG/G
PATCH TOPICAL
Qty: 30 PATCH | Refills: 1 | Status: SHIPPED | OUTPATIENT
Start: 2024-08-12

## 2024-08-14 ENCOUNTER — CLINICAL SUPPORT (OUTPATIENT)
Dept: FAMILY MEDICINE CLINIC | Facility: CLINIC | Age: 64
End: 2024-08-14
Payer: MEDICARE

## 2024-08-14 DIAGNOSIS — J30.9 ALLERGIC RHINITIS, UNSPECIFIED SEASONALITY, UNSPECIFIED TRIGGER: Primary | ICD-10-CM

## 2024-08-14 PROCEDURE — 95117 IMMUNOTHERAPY INJECTIONS: CPT | Performed by: FAMILY MEDICINE

## 2024-08-14 NOTE — PROGRESS NOTES
..  History of Present Illness    Immunotherapy Pre-Injection Questionnaire:         Are you currently pregnant or been diagnosed with a new medical condition? No    1. Have you had increased asthmas symptoms (chest tightness, increased cough, wheezing, or shortness of breath) in the past week? No    2. Have you had increase allergy symptoms (itching eyes or nose, sneezing, runny nose, post-nasal drip, or throat-clearing) in the past week? No    3. Have you had a cold, respiratory tract infection, or flu-like symptoms in the past two weeks? No    4. Did you have any problems such as increased allergy or asthma symptoms, hives, or generalized itching within 12 hours or receiving your last injection? No    5. Are you on any new medications? New eye drops? No    6. Patient confirmed their name and birth date on allergy vials are correct. Yes     Assessment/Plan   Problems Addressed this Visit    None  Visit Diagnoses       Allergic rhinitis, unspecified seasonality, unspecified trigger    -  Primary    Relevant Medications    patient supplied allergy injection (Completed) (Start on 8/14/2024 12:15 PM)    patient supplied allergy injection (Completed) (Start on 8/14/2024 12:15 PM)          Diagnoses         Codes Comments    Allergic rhinitis, unspecified seasonality, unspecified trigger    -  Primary ICD-10-CM: J30.9  ICD-9-CM: 477.9           Diagnoses and all orders for this visit                Clemente Gaffney 8/14/2024

## 2024-08-14 NOTE — PROGRESS NOTES
Chief Complaint  Diabetes (F/u, med mgt )    Referred By: DO Orlando Riley presents to [facility] for diabetes medication management    History of Present Illness    Visit type:  follow-up  Diabetes type:  Type 2  Current diabetes status/concerns/issues:  No concerns with her diabetes.  She would like to come off the metformin if possible  Other health concerns:  back issues  Current Diabetes symptoms:    Polyuria: No   Polydipsia: No   Polyphagia: No   Blurred vision: No   Excessive fatigue: No  Known Diabetes complications:  Neuropathy: None; Location: N/A  Renal: No current urine microalbumin available and Normal eGFR per current labs  Eyes: No current eye exam available in record; Location: N/A  Amputation/Wounds: None  GI: None  Cardiovascular: Hypertension and Other: Tachycardia  ED: N/A  Other: None  Hypoglycemia:  None reported at this time  Hypoglycemia Symptoms:  No hypoglycemia at this time  Current diabetes treatment:   Metformin ER 1000 mg twice a day   Blood glucose device:  Meter  Blood glucose monitoring frequency:  Random/occasional  Blood glucose range/average:   low 100s in the morning; random times staying below 170  Glucose Source: Patient Reported  Diet:  Limits high carb/sweet foods, Avoids sugary drinks  Activity/Exercise:   she stays active    Past Medical History:   Diagnosis Date    Anxiety and depression     Arthritis     DDD (degenerative disc disease), cervical     Diabetes mellitus     Encounter for subsequent annual wellness visit (AWV) in Medicare patient 11/27/2021    Hypertension     Macular degeneration     RIGHT EYE    Seasonal allergies     Spinal stenosis     Tachycardia      Past Surgical History:   Procedure Laterality Date    ANTERIOR CERVICAL DISCECTOMY W/ FUSION      AND INSTRUMENTATION    BACK SURGERY      BILATERAL BREAST REDUCTION Bilateral 04/03/2017    Procedure: BREAST REDUCTION BILATERAL;   Surgeon: Jorge Lebron MD;  Location: [facility] OR;  Service:      SECTION      COLONOSCOPY      HYSTERECTOMY      KNEE SURGERY Right     TONSILLECTOMY      TUBAL ABDOMINAL LIGATION       Family History   Problem Relation Age of Onset    Diabetes Mother     Diabetes Sister     Diabetes Paternal Grandmother     Breast cancer Maternal Aunt      Social History     Socioeconomic History    Marital status:     Number of children: 2   Tobacco Use    Smoking status: Never    Smokeless tobacco: Never   Vaping Use    Vaping status: Never Used   Substance and Sexual Activity    Alcohol use: No    Drug use: No    Sexual activity: Yes     Partners: Male     No Known Allergies    Current Outpatient Medications:     aspirin 81 MG EC tablet, Take 1 tablet by mouth., Disp: , Rfl:     atorvastatin (LIPITOR) 40 MG tablet, TAKE 1 TABLET BY MOUTH AT BEDTIME FOR CHOLESTEROL, Disp: 90 tablet, Rfl: 0    clobetasol (TEMOVATE) 0.05 % cream, APPLY TO AFFECTED AREA(S) EVERY OTHER DAY AS DIRECTED, Disp: 30 g, Rfl: 1    estradiol (ESTRACE VAGINAL) 0.1 MG/GM vaginal cream, Insert 2 applicators into the vagina Daily., Disp: 42.5 g, Rfl: 3    gabapentin (NEURONTIN) 600 MG tablet, Take 1 tablet by mouth 2 (Two) Times a Day., Disp: 180 tablet, Rfl: 3    lidocaine (LIDODERM) 5 %, APPLY ONE PATCH EVERY 12 HOURS AS NEEDED FOR PAIN, Disp: 30 patch, Rfl: 1    losartan (COZAAR) 25 MG tablet, Take 1 tablet by mouth Daily., Disp: 90 tablet, Rfl: 3    metFORMIN ER (GLUCOPHAGE-XR) 500 MG 24 hr tablet, Take 2 tablets by mouth Daily With Breakfast for 180 days., Disp: 180 tablet, Rfl: 1    metoprolol succinate XL (TOPROL-XL) 50 MG 24 hr tablet, TAKE 1 TABLET BY MOUTH ONCE DAILY FOR BLOOD PRESSURE OR HEART, Disp: 90 tablet, Rfl: 1    Multiple Vitamins-Minerals (EYE VITAMINS PO), Take 1 tablet by mouth Daily. HOLD PRIOR TO SURGERY, Disp: , Rfl:     temazepam (RESTORIL) 30 MG capsule, TAKE 1 CAPSULE BY MOUTH AT BEDTIME AS NEEDED FOR ANXIETY OR  "SLEEP, Disp: 30 capsule, Rfl: 4    valACYclovir (Valtrex) 1000 MG tablet, Take 1 tablet by mouth Daily., Disp: 30 tablet, Rfl: 3    venlafaxine XR (EFFEXOR-XR) 150 MG 24 hr capsule, TAKE 1 CAPSULE BY MOUTH ONCE DAILY, Disp: 90 capsule, Rfl: 1    Current Facility-Administered Medications:     cyanocobalamin injection 1,000 mcg, 1,000 mcg, Intramuscular, Q7 Days, Paul Choi DO, 1,000 mcg at 06/25/24 1414    Objective     Vitals:    08/15/24 1026   BP: 130/74   BP Location: Left arm   Patient Position: Sitting   Cuff Size: Adult   Pulse: 73   Temp: 98.1 °F (36.7 °C)   TempSrc: Temporal   SpO2: 99%   Weight: 58.7 kg (129 lb 6.4 oz)   Height: 162.6 cm (64\")     Body mass index is 22.21 kg/m².    Physical Exam  Constitutional:       Appearance: Normal appearance. She is normal weight.   HENT:      Head: Normocephalic and atraumatic.      Right Ear: External ear normal.      Left Ear: External ear normal.      Nose: Nose normal.   Eyes:      Extraocular Movements: Extraocular movements intact.      Conjunctiva/sclera: Conjunctivae normal.   Pulmonary:      Effort: Pulmonary effort is normal.   Musculoskeletal:         General: Normal range of motion.      Cervical back: Normal range of motion.   Skin:     General: Skin is warm and dry.   Neurological:      General: No focal deficit present.      Mental Status: She is alert and oriented to person, place, and time. Mental status is at baseline.   Psychiatric:         Mood and Affect: Mood normal.         Behavior: Behavior normal.         Thought Content: Thought content normal.         Judgment: Judgment normal.             Result Review :   The following data was reviewed by: ROBI Ma on 08/15/2024:    Most Recent A1C          8/15/2024    10:29   HGBA1C Most Recent   Hemoglobin A1C 5.8        A1C Last 3 Results          2/13/2024    08:27 8/15/2024    10:29   HGBA1C Last 3 Results   Hemoglobin A1C 6  5.8      A1c collected in the office today is 5.8%, " indicating Controlled Type II diabetes.  This result is down from the prior result of 6% collected on 2/13/24     Glucose   Date Value Ref Range Status   08/15/2024 107 (H) 70 - 99 mg/dL Final     Comment:     Serial Number: [id number]:  [id number]     Point of care glucose in the office today is within normal limits for nonfasting glucose    Creatinine   Date Value Ref Range Status   05/21/2024 0.67 0.57 - 1.00 mg/dL Final   06/14/2023 0.65 0.57 - 1.00 mg/dL Final     eGFR   Date Value Ref Range Status   05/21/2024 96.5 >60.0 mL/min/1.73 Final   06/14/2023 97.8 >60.0 mL/min/1.73 Final     Labs collected on 5/21/2024 show Normal values      Total Cholesterol   Date Value Ref Range Status   05/21/2024 124 0 - 200 mg/dL Final   06/14/2023 116 0 - 200 mg/dL Final     Triglycerides   Date Value Ref Range Status   05/21/2024 121 0 - 150 mg/dL Final   06/14/2023 295 (H) 0 - 150 mg/dL Final     HDL Cholesterol   Date Value Ref Range Status   05/21/2024 46 40 - 60 mg/dL Final   06/14/2023 34 (L) 40 - 60 mg/dL Final     LDL Cholesterol    Date Value Ref Range Status   05/21/2024 56 0 - 100 mg/dL Final   06/14/2023 38 0 - 100 mg/dL Final     Lipid panel collected on 5/21/2024 shows normal lipid panel            Assessment: She has had further improvement in her A1c without problematic hypoglycemia.  She reports glucose levels are typically staying below 180 even after meals.  He expresses desire to come off of medications for her diabetes if at all possible.      Diagnoses and all orders for this visit:    1. Controlled type 2 diabetes mellitus without complication, without long-term current use of insulin (Primary)  -     Cancel: Microalbumin / Creatinine Urine Ratio - Urine, Clean Catch; Future  -     POC Glycosylated Hemoglobin (Hb A1C)  -     metFORMIN ER (GLUCOPHAGE-XR) 500 MG 24 hr tablet; Take 2 tablets by mouth Daily With Breakfast for 180 days.  Dispense: 180 tablet; Refill: 1    Other orders  -     POC  Glucose        Plan: Given the patient's excellent control of her A1c we will try decreasing the metformin ER 1000 mg to once a day.  She will monitor her glucose levels and if she has a rising glucose level she will contact the office for advice.  We will have her return to the office in 3 months.  If the A1c remains stable we will consider discontinuing the metformin.    The patient will monitor her blood glucose levels once a day.  If she develops problematic hyperglycemia or hypoglycemia or adverse drug reactions, she will contact the office for further instructions.        Follow Up     Return in about 3 months (around 11/15/2024) for Medication Management.    Patient was given instructions and counseling regarding her condition or for health maintenance advice. Please see specific information pulled into the AVS if appropriate.     Harini Sood, ROBI  08/15/2024      Dictated Utilizing Dragon Dictation.  Please note that portions of this note were completed with a voice recognition program.  Part of this note may be an electronic transcription/translation of spoken language to printed text using the Dragon Dictation System.

## 2024-08-15 ENCOUNTER — OFFICE VISIT (OUTPATIENT)
Dept: DIABETES SERVICES | Facility: HOSPITAL | Age: 64
End: 2024-08-15
Payer: MEDICARE

## 2024-08-15 VITALS
HEART RATE: 73 BPM | WEIGHT: 129.4 LBS | TEMPERATURE: 98.1 F | BODY MASS INDEX: 22.09 KG/M2 | SYSTOLIC BLOOD PRESSURE: 130 MMHG | HEIGHT: 64 IN | DIASTOLIC BLOOD PRESSURE: 74 MMHG | OXYGEN SATURATION: 99 %

## 2024-08-15 DIAGNOSIS — E11.9 CONTROLLED TYPE 2 DIABETES MELLITUS WITHOUT COMPLICATION, WITHOUT LONG-TERM CURRENT USE OF INSULIN: Primary | ICD-10-CM

## 2024-08-15 LAB
EXPIRATION DATE: ABNORMAL
GLUCOSE BLDC GLUCOMTR-MCNC: 107 MG/DL (ref 70–99)
HBA1C MFR BLD: 5.8 % (ref 4.5–5.7)
Lab: ABNORMAL

## 2024-08-15 PROCEDURE — 83036 HEMOGLOBIN GLYCOSYLATED A1C: CPT | Performed by: NURSE PRACTITIONER

## 2024-08-15 PROCEDURE — 82948 REAGENT STRIP/BLOOD GLUCOSE: CPT | Performed by: NURSE PRACTITIONER

## 2024-08-15 PROCEDURE — G0463 HOSPITAL OUTPT CLINIC VISIT: HCPCS | Performed by: NURSE PRACTITIONER

## 2024-08-15 RX ORDER — METFORMIN HYDROCHLORIDE 500 MG/1
1000 TABLET, EXTENDED RELEASE ORAL
Qty: 180 TABLET | Refills: 1 | Status: SHIPPED | OUTPATIENT
Start: 2024-08-15 | End: 2025-02-11

## 2024-08-22 ENCOUNTER — OFFICE VISIT (OUTPATIENT)
Dept: ORTHOPEDIC SURGERY | Facility: CLINIC | Age: 64
End: 2024-08-22
Payer: MEDICARE

## 2024-08-22 ENCOUNTER — CLINICAL SUPPORT (OUTPATIENT)
Dept: FAMILY MEDICINE CLINIC | Facility: CLINIC | Age: 64
End: 2024-08-22
Payer: MEDICARE

## 2024-08-22 VITALS
HEART RATE: 80 BPM | BODY MASS INDEX: 23.05 KG/M2 | DIASTOLIC BLOOD PRESSURE: 81 MMHG | HEIGHT: 64 IN | SYSTOLIC BLOOD PRESSURE: 134 MMHG | WEIGHT: 135 LBS | OXYGEN SATURATION: 95 %

## 2024-08-22 DIAGNOSIS — M25.532 LEFT WRIST PAIN: ICD-10-CM

## 2024-08-22 DIAGNOSIS — J30.9 ALLERGIC RHINITIS, UNSPECIFIED SEASONALITY, UNSPECIFIED TRIGGER: Primary | ICD-10-CM

## 2024-08-22 DIAGNOSIS — M25.531 RIGHT WRIST PAIN: Primary | ICD-10-CM

## 2024-08-22 PROCEDURE — 95117 IMMUNOTHERAPY INJECTIONS: CPT | Performed by: FAMILY MEDICINE

## 2024-08-22 RX ORDER — MELOXICAM 15 MG/1
15 TABLET ORAL DAILY
Qty: 30 TABLET | Refills: 1 | Status: SHIPPED | OUTPATIENT
Start: 2024-08-22

## 2024-08-23 NOTE — PROGRESS NOTES
"Chief Complaint  Initial Evaluation and Pain of the Left Wrist and Initial Evaluation and Pain of the Right Wrist     Subjective      Consuelo Carlson presents to [facility] for an evaluation of bilateral wrist pain. She denies any particular injuries to the wrists. Pain has been ongoing for several months. She does a lot of writing. Right wrist is worse than the left. She reports aching sensations and it feels like it's \"pulling apart\".     No Known Allergies     Social History     Socioeconomic History    Marital status:     Number of children: 2   Tobacco Use    Smoking status: Never    Smokeless tobacco: Never   Vaping Use    Vaping status: Never Used   Substance and Sexual Activity    Alcohol use: No    Drug use: No    Sexual activity: Yes     Partners: Male        I reviewed the patient's chief complaint, history of present illness, review of systems, past medical history, surgical history, family history, social history, medications, and allergy list.     Review of Systems     Constitutional: Denies fevers, chills, weight loss  Cardiovascular: Denies chest pain, shortness of breath  Skin: Denies rashes, acute skin changes  Neurologic: Denies headache, loss of consciousness      Vital Signs:   /81   Pulse 80   Ht 162.6 cm (64\")   Wt 61.2 kg (135 lb)   SpO2 95%   BMI 23.17 kg/m²          Physical Exam    General: Alert. No acute distress    Ortho Exam      LEFT HAND/WRIST EXAM:   Palpation: Mild tenderness over the CMC joint and radial styloid. Non-tender at the snuffbox.   ROM: she is able to wiggle fingers and form a fist. Full wrist extension and flexion. Patient has a strong . Full thumb opposition. Full PIP, DIP and PIP flexors. Full finger adduction and abduction. Normal supination and pronation.  Strength: Good tone of wrist extensors and wrist flexors.   Special Tests: Negative Tinel's. Negative Phalen's. Negative median nerve compression test. " Negative triggering of the digit. Negative reyes's test. Negative CMC grind test.  Other: Sensation grossly intact. Neurovascular intact. Radial pulse 2+. Ulnar pulse 2+. No signs of swelling, skin discoloration or atrophy. Skin intact.     Procedures      Imaging Results (Most Recent)       None             Result Review :         No results found.           Assessment and Plan     Diagnoses and all orders for this visit:    1. Right wrist pain (Primary)  -     meloxicam (Mobic) 15 MG tablet; Take 1 tablet by mouth Daily.  Dispense: 30 tablet; Refill: 1    2. Left wrist pain  -     meloxicam (Mobic) 15 MG tablet; Take 1 tablet by mouth Daily.  Dispense: 30 tablet; Refill: 1        Patient prescribed MOBIC 15 mg and provided with home exercises. We will see how she does with this.     Call or return if worsening symptoms.    Follow Up     Return if symptoms worsen or fail to improve.       Patient was given instructions and counseling regarding her condition or for health maintenance advice. Please see specific information pulled into the AVS if appropriate.     Transcribed for Josue Lubin MD by Janessa Menezes.  08/22/24   21:06 EDT      I have personally performed the services described in this document as scribed by the above individual and it is both accurate and complete. Josue Lubin MD 08/23/24

## 2024-08-23 NOTE — PROGRESS NOTES
History of Present Illness    Immunotherapy Pre-Injection Questionnaire:         Are you currently pregnant or been diagnosed with a new medical condition? No    1. Have you had increased asthmas symptoms (chest tightness, increased cough, wheezing, or shortness of breath) in the past week? No    2. Have you had increase allergy symptoms (itching eyes or nose, sneezing, runny nose, post-nasal drip, or throat-clearing) in the past week? No    3. Have you had a cold, respiratory tract infection, or flu-like symptoms in the past two weeks? No    4. Did you have any problems such as increased allergy or asthma symptoms, hives, or generalized itching within 12 hours or receiving your last injection? No    5. Are you on any new medications? New eye drops? No    6. Patient confirmed their name and birth date on allergy vials are correct. Yes     Assessment/Plan   There are no diagnoses linked to this encounter.  Diagnoses and all orders for this visit                Luis Miguel SYLWIA Law 8/23/2024

## 2024-08-28 ENCOUNTER — CLINICAL SUPPORT (OUTPATIENT)
Dept: FAMILY MEDICINE CLINIC | Facility: CLINIC | Age: 64
End: 2024-08-28
Payer: MEDICARE

## 2024-08-28 DIAGNOSIS — J30.9 ALLERGIC RHINITIS, UNSPECIFIED SEASONALITY, UNSPECIFIED TRIGGER: Primary | ICD-10-CM

## 2024-08-28 PROCEDURE — 95117 IMMUNOTHERAPY INJECTIONS: CPT | Performed by: FAMILY MEDICINE

## 2024-08-29 NOTE — PROGRESS NOTES
History of Present Illness    Immunotherapy Pre-Injection Questionnaire:         Are you currently pregnant or been diagnosed with a new medical condition? No    1. Have you had increased asthmas symptoms (chest tightness, increased cough, wheezing, or shortness of breath) in the past week? No    2. Have you had increase allergy symptoms (itching eyes or nose, sneezing, runny nose, post-nasal drip, or throat-clearing) in the past week? No    3. Have you had a cold, respiratory tract infection, or flu-like symptoms in the past two weeks? No    4. Did you have any problems such as increased allergy or asthma symptoms, hives, or generalized itching within 12 hours or receiving your last injection? No    5. Are you on any new medications? New eye drops? No    6. Patient confirmed their name and birth date on allergy vials are correct. Yes     Assessment/Plan   Diagnoses and all orders for this visit:    1. Allergic rhinitis, unspecified seasonality, unspecified trigger (Primary)  -     patient supplied allergy injection  -     patient supplied allergy injection      Diagnoses and all orders for this visit      Patient tolerated well, no reaction after 30 min      Rashmi Reno RN 8/29/2024

## 2024-09-04 ENCOUNTER — CLINICAL SUPPORT (OUTPATIENT)
Dept: FAMILY MEDICINE CLINIC | Facility: CLINIC | Age: 64
End: 2024-09-04
Payer: MEDICARE

## 2024-09-04 DIAGNOSIS — J30.9 ALLERGIC RHINITIS, UNSPECIFIED SEASONALITY, UNSPECIFIED TRIGGER: Primary | ICD-10-CM

## 2024-09-04 PROCEDURE — 95117 IMMUNOTHERAPY INJECTIONS: CPT | Performed by: FAMILY MEDICINE

## 2024-09-04 NOTE — PROGRESS NOTES
..  History of Present Illness    Immunotherapy Pre-Injection Questionnaire:         Are you currently pregnant or been diagnosed with a new medical condition? No    1. Have you had increased asthmas symptoms (chest tightness, increased cough, wheezing, or shortness of breath) in the past week? No    2. Have you had increase allergy symptoms (itching eyes or nose, sneezing, runny nose, post-nasal drip, or throat-clearing) in the past week? No    3. Have you had a cold, respiratory tract infection, or flu-like symptoms in the past two weeks? No    4. Did you have any problems such as increased allergy or asthma symptoms, hives, or generalized itching within 12 hours or receiving your last injection? No    5. Are you on any new medications? New eye drops? No    6. Patient confirmed their name and birth date on allergy vials are correct. Yes     Assessment/Plan   Problems Addressed this Visit    None  Visit Diagnoses       Allergic rhinitis, unspecified seasonality, unspecified trigger    -  Primary    Relevant Medications    patient supplied allergy injection (Start on 9/4/2024  1:00 PM)    patient supplied allergy injection (Start on 9/4/2024  1:00 PM)          Diagnoses         Codes Comments    Allergic rhinitis, unspecified seasonality, unspecified trigger    -  Primary ICD-10-CM: J30.9  ICD-9-CM: 477.9           Diagnoses and all orders for this visit                Clemente Gaffney 9/4/2024

## 2024-09-11 ENCOUNTER — CLINICAL SUPPORT (OUTPATIENT)
Dept: FAMILY MEDICINE CLINIC | Facility: CLINIC | Age: 64
End: 2024-09-11
Payer: MEDICARE

## 2024-09-11 DIAGNOSIS — J30.9 ALLERGIC RHINITIS, UNSPECIFIED SEASONALITY, UNSPECIFIED TRIGGER: Primary | ICD-10-CM

## 2024-09-11 PROCEDURE — 95117 IMMUNOTHERAPY INJECTIONS: CPT | Performed by: FAMILY MEDICINE

## 2024-09-11 NOTE — PROGRESS NOTES
History of Present Illness    Immunotherapy Pre-Injection Questionnaire:         Are you currently pregnant or been diagnosed with a new medical condition? No    1. Have you had increased asthmas symptoms (chest tightness, increased cough, wheezing, or shortness of breath) in the past week? No    2. Have you had increase allergy symptoms (itching eyes or nose, sneezing, runny nose, post-nasal drip, or throat-clearing) in the past week? No    3. Have you had a cold, respiratory tract infection, or flu-like symptoms in the past two weeks? No    4. Did you have any problems such as increased allergy or asthma symptoms, hives, or generalized itching within 12 hours or receiving your last injection? No    5. Are you on any new medications? New eye drops? No    6. Patient confirmed their name and birth date on allergy vials are correct. Yes     Assessment/Plan     Diagnoses and all orders for this visit    Patient tolerated well, nickel-quarter size knot on right arm      Rashmi Reno RN 9/11/2024

## 2024-09-18 ENCOUNTER — CLINICAL SUPPORT (OUTPATIENT)
Dept: FAMILY MEDICINE CLINIC | Facility: CLINIC | Age: 64
End: 2024-09-18
Payer: MEDICARE

## 2024-09-18 DIAGNOSIS — J30.9 ALLERGIC RHINITIS, UNSPECIFIED SEASONALITY, UNSPECIFIED TRIGGER: Primary | ICD-10-CM

## 2024-09-18 PROCEDURE — 95117 IMMUNOTHERAPY INJECTIONS: CPT | Performed by: FAMILY MEDICINE

## 2024-09-25 ENCOUNTER — CLINICAL SUPPORT (OUTPATIENT)
Dept: FAMILY MEDICINE CLINIC | Facility: CLINIC | Age: 64
End: 2024-09-25
Payer: MEDICARE

## 2024-09-25 DIAGNOSIS — J30.9 ALLERGIC RHINITIS, UNSPECIFIED SEASONALITY, UNSPECIFIED TRIGGER: Primary | ICD-10-CM

## 2024-09-25 PROCEDURE — 95117 IMMUNOTHERAPY INJECTIONS: CPT | Performed by: FAMILY MEDICINE

## 2024-10-01 DIAGNOSIS — F51.01 PRIMARY INSOMNIA: Chronic | ICD-10-CM

## 2024-10-01 DIAGNOSIS — I10 PRIMARY HYPERTENSION: Chronic | ICD-10-CM

## 2024-10-02 ENCOUNTER — CLINICAL SUPPORT (OUTPATIENT)
Dept: FAMILY MEDICINE CLINIC | Facility: CLINIC | Age: 64
End: 2024-10-02
Payer: MEDICARE

## 2024-10-02 DIAGNOSIS — Z23 NEED FOR VACCINATION: ICD-10-CM

## 2024-10-02 DIAGNOSIS — J30.9 ALLERGIC RHINITIS, UNSPECIFIED SEASONALITY, UNSPECIFIED TRIGGER: Primary | ICD-10-CM

## 2024-10-02 PROCEDURE — 95117 IMMUNOTHERAPY INJECTIONS: CPT | Performed by: FAMILY MEDICINE

## 2024-10-02 PROCEDURE — 90662 IIV NO PRSV INCREASED AG IM: CPT | Performed by: FAMILY MEDICINE

## 2024-10-02 PROCEDURE — G0008 ADMIN INFLUENZA VIRUS VAC: HCPCS | Performed by: FAMILY MEDICINE

## 2024-10-02 RX ORDER — TEMAZEPAM 30 MG
CAPSULE ORAL
Qty: 30 CAPSULE | Refills: 3 | Status: SHIPPED | OUTPATIENT
Start: 2024-10-02

## 2024-10-02 RX ORDER — ATORVASTATIN CALCIUM 40 MG/1
40 TABLET, FILM COATED ORAL NIGHTLY
Qty: 90 TABLET | Refills: 0 | Status: SHIPPED | OUTPATIENT
Start: 2024-10-02

## 2024-10-02 NOTE — PROGRESS NOTES
Patient tolerated the flu vaccine well, no reaction after a 15 min wait..  History of Present Illness    Immunotherapy Pre-Injection Questionnaire:         Are you currently pregnant or been diagnosed with a new medical condition? No    1. Have you had increased asthmas symptoms (chest tightness, increased cough, wheezing, or shortness of breath) in the past week? No    2. Have you had increase allergy symptoms (itching eyes or nose, sneezing, runny nose, post-nasal drip, or throat-clearing) in the past week? No    3. Have you had a cold, respiratory tract infection, or flu-like symptoms in the past two weeks? No    4. Did you have any problems such as increased allergy or asthma symptoms, hives, or generalized itching within 12 hours or receiving your last injection? No    5. Are you on any new medications? New eye drops? No    6. Patient confirmed their name and birth date on allergy vials are correct. Yes     Assessment/Plan   Problems Addressed this Visit    None  Diagnoses    None.       Diagnoses and all orders for this visit                Clemente Gaffney 10/2/2024

## 2024-10-09 ENCOUNTER — CLINICAL SUPPORT (OUTPATIENT)
Dept: FAMILY MEDICINE CLINIC | Facility: CLINIC | Age: 64
End: 2024-10-09
Payer: MEDICARE

## 2024-10-09 DIAGNOSIS — Z23 NEED FOR VACCINATION: ICD-10-CM

## 2024-10-09 DIAGNOSIS — J30.9 ALLERGIC RHINITIS, UNSPECIFIED SEASONALITY, UNSPECIFIED TRIGGER: Primary | ICD-10-CM

## 2024-10-09 PROCEDURE — 95117 IMMUNOTHERAPY INJECTIONS: CPT | Performed by: FAMILY MEDICINE

## 2024-10-09 NOTE — PROGRESS NOTES
..  History of Present Illness    Immunotherapy Pre-Injection Questionnaire:         Are you currently pregnant or been diagnosed with a new medical condition? No    1. Have you had increased asthmas symptoms (chest tightness, increased cough, wheezing, or shortness of breath) in the past week? No    2. Have you had increase allergy symptoms (itching eyes or nose, sneezing, runny nose, post-nasal drip, or throat-clearing) in the past week? No    3. Have you had a cold, respiratory tract infection, or flu-like symptoms in the past two weeks? No    4. Did you have any problems such as increased allergy or asthma symptoms, hives, or generalized itching within 12 hours or receiving your last injection? No    5. Are you on any new medications? New eye drops? No    6. Patient confirmed their name and birth date on allergy vials are correct. Yes     Assessment/Plan   Problems Addressed this Visit    None  Diagnoses    None.       Diagnoses and all orders for this visit                Clemente Gaffney 10/9/2024

## 2024-10-16 ENCOUNTER — CLINICAL SUPPORT (OUTPATIENT)
Dept: FAMILY MEDICINE CLINIC | Facility: CLINIC | Age: 64
End: 2024-10-16
Payer: MEDICARE

## 2024-10-16 DIAGNOSIS — J30.9 ALLERGIC RHINITIS, UNSPECIFIED SEASONALITY, UNSPECIFIED TRIGGER: Primary | ICD-10-CM

## 2024-10-16 PROCEDURE — 95117 IMMUNOTHERAPY INJECTIONS: CPT | Performed by: FAMILY MEDICINE

## 2024-10-16 NOTE — PROGRESS NOTES
..  History of Present Illness    Immunotherapy Pre-Injection Questionnaire:         Are you currently pregnant or been diagnosed with a new medical condition? No    1. Have you had increased asthmas symptoms (chest tightness, increased cough, wheezing, or shortness of breath) in the past week? No    2. Have you had increase allergy symptoms (itching eyes or nose, sneezing, runny nose, post-nasal drip, or throat-clearing) in the past week? No    3. Have you had a cold, respiratory tract infection, or flu-like symptoms in the past two weeks? No    4. Did you have any problems such as increased allergy or asthma symptoms, hives, or generalized itching within 12 hours or receiving your last injection? No    5. Are you on any new medications? New eye drops? No    6. Patient confirmed their name and birth date on allergy vials are correct. Yes     Assessment/Plan   Problems Addressed this Visit    None  Diagnoses    None.       Diagnoses and all orders for this visit                Clemente Gaffney 10/16/2024

## 2024-10-23 ENCOUNTER — CLINICAL SUPPORT (OUTPATIENT)
Dept: FAMILY MEDICINE CLINIC | Facility: CLINIC | Age: 64
End: 2024-10-23
Payer: MEDICARE

## 2024-10-23 DIAGNOSIS — J30.9 ALLERGIC RHINITIS, UNSPECIFIED SEASONALITY, UNSPECIFIED TRIGGER: Primary | ICD-10-CM

## 2024-10-23 PROCEDURE — 95117 IMMUNOTHERAPY INJECTIONS: CPT | Performed by: FAMILY MEDICINE

## 2024-10-23 NOTE — PROGRESS NOTES
History of Present Illness    Immunotherapy Pre-Injection Questionnaire:         Are you currently pregnant or been diagnosed with a new medical condition? No    1. Have you had increased asthmas symptoms (chest tightness, increased cough, wheezing, or shortness of breath) in the past week? No    2. Have you had increase allergy symptoms (itching eyes or nose, sneezing, runny nose, post-nasal drip, or throat-clearing) in the past week? No    3. Have you had a cold, respiratory tract infection, or flu-like symptoms in the past two weeks? No    4. Did you have any problems such as increased allergy or asthma symptoms, hives, or generalized itching within 12 hours or receiving your last injection? No    5. Are you on any new medications? New eye drops? No    6. Patient confirmed their name and birth date on allergy vials are correct. Yes     Assessment/Plan     Diagnoses and all orders for this visit          Rashmi Reno RN 10/23/2024

## 2024-10-30 ENCOUNTER — CLINICAL SUPPORT (OUTPATIENT)
Dept: FAMILY MEDICINE CLINIC | Facility: CLINIC | Age: 64
End: 2024-10-30
Payer: MEDICARE

## 2024-10-30 DIAGNOSIS — J30.9 ALLERGIC RHINITIS, UNSPECIFIED SEASONALITY, UNSPECIFIED TRIGGER: Primary | ICD-10-CM

## 2024-10-30 PROCEDURE — 95117 IMMUNOTHERAPY INJECTIONS: CPT | Performed by: FAMILY MEDICINE

## 2024-10-30 NOTE — PROGRESS NOTES

## 2024-11-06 ENCOUNTER — CLINICAL SUPPORT (OUTPATIENT)
Dept: FAMILY MEDICINE CLINIC | Facility: CLINIC | Age: 64
End: 2024-11-06
Payer: MEDICARE

## 2024-11-06 DIAGNOSIS — J30.9 ALLERGIC RHINITIS, UNSPECIFIED SEASONALITY, UNSPECIFIED TRIGGER: Primary | ICD-10-CM

## 2024-11-06 PROCEDURE — 95117 IMMUNOTHERAPY INJECTIONS: CPT | Performed by: FAMILY MEDICINE

## 2024-11-10 NOTE — PROGRESS NOTES
Chief Complaint  Diabetes (Med mgt, A1c eval)    Referred By: DO Orlando Rileyles presents to [facility] for diabetes medication management    History of Present Illness    Visit type:  follow-up  Diabetes type:  Type 2  Current diabetes status/concerns/issues:  She did decrease the Metformin at the last appointment.  She was wanting to come off of the medications if possible.  Other health concerns: No new health concerns  Current Diabetes symptoms:    Polyuria: No   Polydipsia: No   Polyphagia: No   Blurred vision: No   Excessive fatigue: No  Known Diabetes complications:  Neuropathy: None; Location: N/A  Renal: No current urine microalbumin available and Normal eGFR per current labs  Eyes: No current eye exam available in record; Location: N/A  Amputation/Wounds: None  GI: None  Cardiovascular: Hypertension and Other: Tachycardia  ED: N/A  Other: None  Hypoglycemia:  Relative - she has some symptoms when her glucose is below  Hypoglycemia Symptoms:  shaking/tremors and hunger  Current diabetes treatment:  Metformin ER 1000 mg once a day   Blood glucose device:  Meter  Blood glucose monitoring frequency:  Random/occasional  Blood glucose range/average:   Glucose levels in the morning are high in the 170-200's; 's in the evenings    Glucose Source: BG Logs  Diet:  Limits high carb/sweet foods, she drinks 1/2 and 1/2 tea  Activity/Exercise:   she is active throughout the day    Past Medical History:   Diagnosis Date    Anxiety and depression     Arthritis     DDD (degenerative disc disease), cervical     Diabetes mellitus     Encounter for subsequent annual wellness visit (AWV) in Medicare patient 11/27/2021    Hypertension     Macular degeneration     RIGHT EYE    Seasonal allergies     Spinal stenosis     Tachycardia      Past Surgical History:   Procedure Laterality Date    ANTERIOR CERVICAL DISCECTOMY W/ FUSION      AND INSTRUMENTATION     BACK SURGERY      BILATERAL BREAST REDUCTION Bilateral 2017    Procedure: BREAST REDUCTION BILATERAL;  Surgeon: Jorge Lebron MD;  Location: [facility] MAIN OR;  Service:      SECTION      COLONOSCOPY      HYSTERECTOMY      KNEE SURGERY Right     TONSILLECTOMY      TUBAL ABDOMINAL LIGATION       Family History   Problem Relation Age of Onset    Diabetes Mother     Diabetes Sister     Diabetes Paternal Grandmother     Breast cancer Maternal Aunt      Social History     Socioeconomic History    Marital status:     Number of children: 2   Tobacco Use    Smoking status: Never    Smokeless tobacco: Never   Vaping Use    Vaping status: Never Used   Substance and Sexual Activity    Alcohol use: No    Drug use: No    Sexual activity: Yes     Partners: Male     Birth control/protection: Hysterectomy     No Known Allergies    Current Outpatient Medications:     aspirin 81 MG EC tablet, Take 1 tablet by mouth., Disp: , Rfl:     atorvastatin (LIPITOR) 40 MG tablet, TAKE 1 TABLET BY MOUTH AT BEDTIME FOR CHOLESTEROL, Disp: 90 tablet, Rfl: 0    clobetasol (TEMOVATE) 0.05 % cream, APPLY TO AFFECTED AREA(S) EVERY OTHER DAY AS DIRECTED, Disp: 30 g, Rfl: 1    lidocaine (LIDODERM) 5 %, APPLY ONE PATCH EVERY 12 HOURS AS NEEDED FOR PAIN, Disp: 30 patch, Rfl: 1    metFORMIN ER (GLUCOPHAGE-XR) 500 MG 24 hr tablet, Take 2 tablets by mouth Daily With Breakfast & Dinner for 180 days., Disp: 360 tablet, Rfl: 1    metoprolol succinate XL (TOPROL-XL) 50 MG 24 hr tablet, TAKE 1 TABLET BY MOUTH ONCE DAILY FOR BLOOD PRESSURE OR HEART, Disp: 90 tablet, Rfl: 1    Multiple Vitamins-Minerals (EYE VITAMINS PO), Take 1 tablet by mouth Daily. HOLD PRIOR TO SURGERY, Disp: , Rfl:     temazepam (RESTORIL) 30 MG capsule, TAKE 1 CAPSULE BY MOUTH AT BEDTIME AS NEEDED FOR ANXIETY OR SLEEP, Disp: 30 capsule, Rfl: 3    venlafaxine XR (EFFEXOR-XR) 150 MG 24 hr capsule, TAKE 1 CAPSULE BY MOUTH ONCE DAILY, Disp: 90 capsule, Rfl: 1    Objective  "    Vitals:    11/11/24 1123   BP: 134/90   BP Location: Right arm   Patient Position: Sitting   Cuff Size: Adult   Pulse: 77   Temp: 98.1 °F (36.7 °C)   TempSrc: Temporal   SpO2: 99%   Weight: 59.9 kg (132 lb)   Height: 162.6 cm (64\")     Body mass index is 22.66 kg/m².    Physical Exam  Constitutional:       Appearance: Normal appearance. She is normal weight.   HENT:      Head: Normocephalic and atraumatic.      Right Ear: External ear normal.      Left Ear: External ear normal.      Nose: Nose normal.   Eyes:      Extraocular Movements: Extraocular movements intact.      Conjunctiva/sclera: Conjunctivae normal.   Pulmonary:      Effort: Pulmonary effort is normal.   Musculoskeletal:         General: Normal range of motion.      Cervical back: Normal range of motion.   Skin:     General: Skin is warm and dry.   Neurological:      General: No focal deficit present.      Mental Status: She is alert and oriented to person, place, and time. Mental status is at baseline.   Psychiatric:         Mood and Affect: Mood normal.         Behavior: Behavior normal.         Thought Content: Thought content normal.         Judgment: Judgment normal.             Result Review :   The following data was reviewed by: ROBI Ma on 11/11/2024:    Most Recent A1C          11/11/2024    11:19   HGBA1C Most Recent   Hemoglobin A1C 7.9        A1C Last 3 Results          2/13/2024    08:27 8/15/2024    10:29 11/11/2024    11:19   HGBA1C Last 3 Results   Hemoglobin A1C 6  5.8  7.9      A1c collected in the office today is 7.9%, indicating Uncontrolled Type II diabetes.  This result is up from the prior result of 5.8% collected on 8/15/24     Glucose   Date Value Ref Range Status   11/11/2024 334 (H) 70 - 99 mg/dL Final     Comment:     Serial Number: [id number]:  [id number]     Point of care glucose in the office today is  markedly elevated at 334 mg/dL.  She states she ate a [employer] apple pie          "     Assessment: She has had a significant increase in her A1c since last appointment.  At the last appointment the patient had expressed a desire to try to come off of the metformin if she was able to.  Her A1c is very well-controlled so we decided to decrease the metformin from 1000 mg twice a day to 1000 mg once a day.  The patient denies any significant changes to her dietary behavior that would have contributed to the increase in the A1c.  She has had a 3 pound weight gain but she remains very active.  Her glucose levels markedly elevated in the appointment today but she admits to eating a [employer] apple pie prior to coming to the office.      Diagnoses and all orders for this visit:    1. Uncontrolled type 2 diabetes mellitus with hyperglycemia (Primary)  -     POC Glycosylated Hemoglobin (Hb A1C)  -     Microalbumin / Creatinine Urine Ratio - Urine, Clean Catch; Future  -     POC Glucose  -     metFORMIN ER (GLUCOPHAGE-XR) 500 MG 24 hr tablet; Take 2 tablets by mouth Daily With Breakfast & Dinner for 180 days.  Dispense: 360 tablet; Refill: 1    2. Type 2 diabetes mellitus without complication, without long-term current use of insulin        Plan: We will increase the metformin ER back to the 1000 g twice a day.  She has any problems tolerating the increased dose she will contact the office.  She is to focus on dietary strategies to further control her glucose levels.  She is encouraged to increase her activity as well.  She will be scheduled for routine follow-up appointment.    The patient will monitor her blood glucose levels once a day.  If she develops problematic hyperglycemia or hypoglycemia or adverse drug reactions, she will contact the office for further instructions.        Follow Up     Return in about 3 months (around 2/11/2025) for Medication Management.    Patient was given instructions and counseling regarding her condition or for health maintenance advice. Please see specific information  pulled into the AVS if appropriate.     Harini Sood, APRN  11/11/2024      Dictated Utilizing Dragon Dictation.  Please note that portions of this note were completed with a voice recognition program.  Part of this note may be an electronic transcription/translation of spoken language to printed text using the Dragon Dictation System.

## 2024-11-11 ENCOUNTER — OFFICE VISIT (OUTPATIENT)
Dept: DIABETES SERVICES | Facility: HOSPITAL | Age: 64
End: 2024-11-11
Payer: MEDICARE

## 2024-11-11 VITALS
OXYGEN SATURATION: 99 % | HEART RATE: 77 BPM | TEMPERATURE: 98.1 F | DIASTOLIC BLOOD PRESSURE: 90 MMHG | BODY MASS INDEX: 22.53 KG/M2 | WEIGHT: 132 LBS | HEIGHT: 64 IN | SYSTOLIC BLOOD PRESSURE: 134 MMHG

## 2024-11-11 DIAGNOSIS — E11.9 TYPE 2 DIABETES MELLITUS WITHOUT COMPLICATION, WITHOUT LONG-TERM CURRENT USE OF INSULIN: ICD-10-CM

## 2024-11-11 DIAGNOSIS — E11.65 UNCONTROLLED TYPE 2 DIABETES MELLITUS WITH HYPERGLYCEMIA: Primary | ICD-10-CM

## 2024-11-11 LAB
ALBUMIN UR-MCNC: 2.2 MG/DL
CREAT UR-MCNC: 119.8 MG/DL
EXPIRATION DATE: ABNORMAL
GLUCOSE BLDC GLUCOMTR-MCNC: 334 MG/DL (ref 70–99)
HBA1C MFR BLD: 7.9 % (ref 4.5–5.7)
Lab: ABNORMAL
MICROALBUMIN/CREAT UR: 18.4 MG/G (ref 0–29)

## 2024-11-11 PROCEDURE — 99214 OFFICE O/P EST MOD 30 MIN: CPT | Performed by: NURSE PRACTITIONER

## 2024-11-11 PROCEDURE — 3075F SYST BP GE 130 - 139MM HG: CPT | Performed by: NURSE PRACTITIONER

## 2024-11-11 PROCEDURE — 82043 UR ALBUMIN QUANTITATIVE: CPT | Performed by: NURSE PRACTITIONER

## 2024-11-11 PROCEDURE — 1160F RVW MEDS BY RX/DR IN RCRD: CPT | Performed by: NURSE PRACTITIONER

## 2024-11-11 PROCEDURE — 83036 HEMOGLOBIN GLYCOSYLATED A1C: CPT | Performed by: NURSE PRACTITIONER

## 2024-11-11 PROCEDURE — G0463 HOSPITAL OUTPT CLINIC VISIT: HCPCS | Performed by: NURSE PRACTITIONER

## 2024-11-11 PROCEDURE — 82948 REAGENT STRIP/BLOOD GLUCOSE: CPT | Performed by: NURSE PRACTITIONER

## 2024-11-11 PROCEDURE — 82570 ASSAY OF URINE CREATININE: CPT | Performed by: NURSE PRACTITIONER

## 2024-11-11 PROCEDURE — 3051F HG A1C>EQUAL 7.0%<8.0%: CPT | Performed by: NURSE PRACTITIONER

## 2024-11-11 PROCEDURE — 1159F MED LIST DOCD IN RCRD: CPT | Performed by: NURSE PRACTITIONER

## 2024-11-11 PROCEDURE — 3080F DIAST BP >= 90 MM HG: CPT | Performed by: NURSE PRACTITIONER

## 2024-11-11 RX ORDER — METFORMIN HYDROCHLORIDE 500 MG/1
1000 TABLET, EXTENDED RELEASE ORAL
Qty: 360 TABLET | Refills: 1 | Status: SHIPPED | OUTPATIENT
Start: 2024-11-11 | End: 2025-05-10

## 2024-11-13 ENCOUNTER — CLINICAL SUPPORT (OUTPATIENT)
Dept: FAMILY MEDICINE CLINIC | Facility: CLINIC | Age: 64
End: 2024-11-13
Payer: MEDICARE

## 2024-11-13 DIAGNOSIS — J30.9 ALLERGIC RHINITIS, UNSPECIFIED SEASONALITY, UNSPECIFIED TRIGGER: Primary | ICD-10-CM

## 2024-11-13 PROCEDURE — 95117 IMMUNOTHERAPY INJECTIONS: CPT | Performed by: FAMILY MEDICINE

## 2024-11-13 NOTE — PROGRESS NOTES
History of Present Illness    Immunotherapy Pre-Injection Questionnaire:         Are you currently pregnant or been diagnosed with a new medical condition? No    1. Have you had increased asthmas symptoms (chest tightness, increased cough, wheezing, or shortness of breath) in the past week? No    2. Have you had increase allergy symptoms (itching eyes or nose, sneezing, runny nose, post-nasal drip, or throat-clearing) in the past week? No    3. Have you had a cold, respiratory tract infection, or flu-like symptoms in the past two weeks? No    4. Did you have any problems such as increased allergy or asthma symptoms, hives, or generalized itching within 12 hours or receiving your last injection? No    5. Are you on any new medications? New eye drops? No    6. Patient confirmed their name and birth date on allergy vials are correct. Yes     Assessment/Plan     Diagnoses and all orders for this visit        Rashmi Reno RN 11/13/2024

## 2024-11-20 ENCOUNTER — CLINICAL SUPPORT (OUTPATIENT)
Dept: FAMILY MEDICINE CLINIC | Facility: CLINIC | Age: 64
End: 2024-11-20
Payer: MEDICARE

## 2024-11-20 DIAGNOSIS — J30.9 ALLERGIC RHINITIS, UNSPECIFIED SEASONALITY, UNSPECIFIED TRIGGER: Primary | ICD-10-CM

## 2024-11-20 PROCEDURE — 95117 IMMUNOTHERAPY INJECTIONS: CPT | Performed by: FAMILY MEDICINE

## 2024-11-20 NOTE — PROGRESS NOTES
History of Present Illness    Immunotherapy Pre-Injection Questionnaire:         Are you currently pregnant or been diagnosed with a new medical condition? No    1. Have you had increased asthmas symptoms (chest tightness, increased cough, wheezing, or shortness of breath) in the past week? No    2. Have you had increase allergy symptoms (itching eyes or nose, sneezing, runny nose, post-nasal drip, or throat-clearing) in the past week? No    3. Have you had a cold, respiratory tract infection, or flu-like symptoms in the past two weeks? No    4. Did you have any problems such as increased allergy or asthma symptoms, hives, or generalized itching within 12 hours or receiving your last injection? No    5. Are you on any new medications? New eye drops? No    6. Patient confirmed their name and birth date on allergy vials are correct. Yes     Assessment/Plan     Diagnoses and all orders for this visit      Rashmi Reno RN 11/20/2024

## 2024-11-25 ENCOUNTER — CLINICAL SUPPORT (OUTPATIENT)
Dept: FAMILY MEDICINE CLINIC | Facility: CLINIC | Age: 64
End: 2024-11-25
Payer: MEDICARE

## 2024-11-25 DIAGNOSIS — J30.9 ALLERGIC RHINITIS, UNSPECIFIED SEASONALITY, UNSPECIFIED TRIGGER: Primary | ICD-10-CM

## 2024-11-25 PROCEDURE — 95117 IMMUNOTHERAPY INJECTIONS: CPT | Performed by: FAMILY MEDICINE

## 2024-11-25 NOTE — PROGRESS NOTES
History of Present Illness    Immunotherapy Pre-Injection Questionnaire:         Are you currently pregnant or been diagnosed with a new medical condition? No    1. Have you had increased asthmas symptoms (chest tightness, increased cough, wheezing, or shortness of breath) in the past week? No    2. Have you had increase allergy symptoms (itching eyes or nose, sneezing, runny nose, post-nasal drip, or throat-clearing) in the past week? No    3. Have you had a cold, respiratory tract infection, or flu-like symptoms in the past two weeks? No    4. Did you have any problems such as increased allergy or asthma symptoms, hives, or generalized itching within 12 hours or receiving your last injection? No    5. Are you on any new medications? New eye drops? No    6. Patient confirmed their name and birth date on allergy vials are correct. Yes     Assessment/Plan     Diagnoses and all orders for this visit        Rashmi Reno RN 11/25/2024

## 2024-12-03 ENCOUNTER — CLINICAL SUPPORT (OUTPATIENT)
Dept: FAMILY MEDICINE CLINIC | Facility: CLINIC | Age: 64
End: 2024-12-03
Payer: MEDICARE

## 2024-12-03 DIAGNOSIS — J30.9 ALLERGIC RHINITIS, UNSPECIFIED SEASONALITY, UNSPECIFIED TRIGGER: Primary | ICD-10-CM

## 2024-12-03 PROCEDURE — 95117 IMMUNOTHERAPY INJECTIONS: CPT | Performed by: FAMILY MEDICINE

## 2024-12-03 NOTE — PROGRESS NOTES
History of Present Illness    Immunotherapy Pre-Injection Questionnaire:         Are you currently pregnant or been diagnosed with a new medical condition? No    1. Have you had increased asthmas symptoms (chest tightness, increased cough, wheezing, or shortness of breath) in the past week? No    2. Have you had increase allergy symptoms (itching eyes or nose, sneezing, runny nose, post-nasal drip, or throat-clearing) in the past week? No    3. Have you had a cold, respiratory tract infection, or flu-like symptoms in the past two weeks? No    4. Did you have any problems such as increased allergy or asthma symptoms, hives, or generalized itching within 12 hours or receiving your last injection? No    5. Are you on any new medications? New eye drops? No    6. Patient confirmed their name and birth date on allergy vials are correct. Yes     Assessment/Plan     Diagnoses and all orders for this visit      During administration of allergy medication, it was observed that the syringe used to draw and administer serum A had a blunt and defective needle. Was unable to continue with administration and due to policy, new dose of vial A drawn up in a new syringe and shot administered                Ira Santo MA 12/3/2024

## 2024-12-10 ENCOUNTER — CLINICAL SUPPORT (OUTPATIENT)
Dept: FAMILY MEDICINE CLINIC | Facility: CLINIC | Age: 64
End: 2024-12-10
Payer: MEDICARE

## 2024-12-10 DIAGNOSIS — J30.9 ALLERGIC RHINITIS, UNSPECIFIED SEASONALITY, UNSPECIFIED TRIGGER: Primary | ICD-10-CM

## 2024-12-10 PROCEDURE — 95117 IMMUNOTHERAPY INJECTIONS: CPT | Performed by: FAMILY MEDICINE

## 2024-12-10 NOTE — PROGRESS NOTES
History of Present Illness    Immunotherapy Pre-Injection Questionnaire:         Are you currently pregnant or been diagnosed with a new medical condition? No    1. Have you had increased asthmas symptoms (chest tightness, increased cough, wheezing, or shortness of breath) in the past week? No    2. Have you had increase allergy symptoms (itching eyes or nose, sneezing, runny nose, post-nasal drip, or throat-clearing) in the past week? No    3. Have you had a cold, respiratory tract infection, or flu-like symptoms in the past two weeks? No    4. Did you have any problems such as increased allergy or asthma symptoms, hives, or generalized itching within 12 hours or receiving your last injection? No    5. Are you on any new medications? New eye drops? No    6. Patient confirmed their name and birth date on allergy vials are correct. Yes     Assessment/Plan   Diagnoses and all orders for this visit:    1. Allergic rhinitis, unspecified seasonality, unspecified trigger (Primary)  -     patient supplied allergy injection  -     patient supplied allergy injection      Diagnoses and all orders for this visit                Karely Siddiqui MA 12/10/2024

## 2024-12-16 ENCOUNTER — TELEPHONE (OUTPATIENT)
Dept: FAMILY MEDICINE CLINIC | Facility: CLINIC | Age: 64
End: 2024-12-16

## 2024-12-16 NOTE — TELEPHONE ENCOUNTER
Caller: Consuelo Carlson    Relationship to patient: Self    Best call back number: [phone]    Chief complaint: EARACHE IN BOTH EARS     Type of visit: SAME DAY     Requested date: 12.16.24    Additional notes: PATIENT IS OKAY WITH SEEING ANY PROVIDER BUT IS NEEDING AN APPT BEFORE 1 OR AFTER 3 PM. HUB UNABLE TO ACCOMMODATE REQUEST.

## 2024-12-18 ENCOUNTER — OFFICE VISIT (OUTPATIENT)
Dept: FAMILY MEDICINE CLINIC | Facility: CLINIC | Age: 64
End: 2024-12-18
Payer: MEDICARE

## 2024-12-18 VITALS
HEART RATE: 114 BPM | BODY MASS INDEX: 22.2 KG/M2 | TEMPERATURE: 98.6 F | DIASTOLIC BLOOD PRESSURE: 70 MMHG | SYSTOLIC BLOOD PRESSURE: 135 MMHG | OXYGEN SATURATION: 97 % | WEIGHT: 130 LBS | HEIGHT: 64 IN

## 2024-12-18 DIAGNOSIS — R52 GENERALIZED BODY ACHES: ICD-10-CM

## 2024-12-18 DIAGNOSIS — U07.1 COVID-19: ICD-10-CM

## 2024-12-18 DIAGNOSIS — R68.83 CHILLS: Primary | ICD-10-CM

## 2024-12-18 LAB
EXPIRATION DATE: ABNORMAL
FLUAV AG UPPER RESP QL IA.RAPID: NOT DETECTED
FLUBV AG UPPER RESP QL IA.RAPID: NOT DETECTED
INTERNAL CONTROL: ABNORMAL
Lab: ABNORMAL
SARS-COV-2 AG UPPER RESP QL IA.RAPID: DETECTED

## 2024-12-18 PROCEDURE — 96372 THER/PROPH/DIAG INJ SC/IM: CPT

## 2024-12-18 PROCEDURE — 3051F HG A1C>EQUAL 7.0%<8.0%: CPT

## 2024-12-18 PROCEDURE — 3075F SYST BP GE 130 - 139MM HG: CPT

## 2024-12-18 PROCEDURE — 3078F DIAST BP <80 MM HG: CPT

## 2024-12-18 PROCEDURE — 87428 SARSCOV & INF VIR A&B AG IA: CPT

## 2024-12-18 PROCEDURE — 1125F AMNT PAIN NOTED PAIN PRSNT: CPT

## 2024-12-18 PROCEDURE — 99214 OFFICE O/P EST MOD 30 MIN: CPT

## 2024-12-18 RX ORDER — TRIAMCINOLONE ACETONIDE 40 MG/ML
60 INJECTION, SUSPENSION INTRA-ARTICULAR; INTRAMUSCULAR ONCE
Status: COMPLETED | OUTPATIENT
Start: 2024-12-18 | End: 2024-12-18

## 2024-12-18 RX ADMIN — TRIAMCINOLONE ACETONIDE 60 MG: 40 INJECTION, SUSPENSION INTRA-ARTICULAR; INTRAMUSCULAR at 10:25

## 2024-12-18 NOTE — PROGRESS NOTES
Chief Complaint     Nasal Congestion (Since  ), Headache, Generalized Body Aches, Chills, and Earache    History of Present Illness     Consuelo Carlson is a 64 y.o. female who presents to [facility] for evaluation of nasal congestion, headache, body aches, chills, and earache.      She was tested today and came back positive for COVID> She has has symptoms since . Her cousin was positive for covid yesterday and she was with her Saturday.      History      Past Medical History:   Diagnosis Date    Anxiety and depression     Arthritis     DDD (degenerative disc disease), cervical     Diabetes mellitus     Encounter for subsequent annual wellness visit (AWV) in Medicare patient 2021    Hypertension     Macular degeneration     RIGHT EYE    Seasonal allergies     Spinal stenosis     Tachycardia        Past Surgical History:   Procedure Laterality Date    ANTERIOR CERVICAL DISCECTOMY W/ FUSION      AND INSTRUMENTATION    BACK SURGERY      BILATERAL BREAST REDUCTION Bilateral 2017    Procedure: BREAST REDUCTION BILATERAL;  Surgeon: Jorge Lebron MD;  Location: [facility];  Service:      SECTION      COLONOSCOPY      HYSTERECTOMY      KNEE SURGERY Right     TONSILLECTOMY      TUBAL ABDOMINAL LIGATION         Family History   Problem Relation Age of Onset    Diabetes Mother     Diabetes Sister     Diabetes Paternal Grandmother     Breast cancer Maternal Aunt         Current Medications        Current Outpatient Medications:     aspirin 81 MG EC tablet, Take 1 tablet by mouth., Disp: , Rfl:     atorvastatin (LIPITOR) 40 MG tablet, TAKE 1 TABLET BY MOUTH AT BEDTIME FOR CHOLESTEROL, Disp: 90 tablet, Rfl: 0    clobetasol (TEMOVATE) 0.05 % cream, APPLY TO AFFECTED AREA(S) EVERY OTHER DAY AS DIRECTED, Disp: 30 g, Rfl: 1    lidocaine (LIDODERM) 5 %, APPLY ONE PATCH EVERY 12 HOURS AS NEEDED FOR PAIN, Disp: 30 patch, Rfl: 1    metFORMIN ER (GLUCOPHAGE-XR) 500  "MG 24 hr tablet, Take 2 tablets by mouth Daily With Breakfast & Dinner for 180 days. (Patient taking differently: Take 2 tablets by mouth 2 (Two) Times a Day.), Disp: 360 tablet, Rfl: 1    metoprolol succinate XL (TOPROL-XL) 50 MG 24 hr tablet, TAKE 1 TABLET BY MOUTH ONCE DAILY FOR BLOOD PRESSURE OR HEART, Disp: 90 tablet, Rfl: 1    Multiple Vitamins-Minerals (EYE VITAMINS PO), Take 1 tablet by mouth Daily. HOLD PRIOR TO SURGERY, Disp: , Rfl:     temazepam (RESTORIL) 30 MG capsule, TAKE 1 CAPSULE BY MOUTH AT BEDTIME AS NEEDED FOR ANXIETY OR SLEEP, Disp: 30 capsule, Rfl: 3    venlafaxine XR (EFFEXOR-XR) 150 MG 24 hr capsule, TAKE 1 CAPSULE BY MOUTH ONCE DAILY, Disp: 90 capsule, Rfl: 1  No current facility-administered medications for this visit.     Allergies     No Known Allergies    Social History       Social History     Social History Narrative    , retired, 2 adult children, lives at home with  [phone]       Immunizations     Immunization:  Immunization History   Administered Date(s) Administered    COVID-19 (MODERNA) 1st,2nd,3rd Dose Monovalent 03/19/2021, 04/16/2021, 02/02/2022    Fluzone (or Fluarix & Flulaval for VFC) >6mos 12/08/2022    Fluzone High-Dose 65+YRS 10/02/2024    Fluzone Quad >6mos (Multi-dose) 10/26/2020    Influenza, Unspecified 11/01/2023          Objective     Objective     Vital Signs:   /70 (BP Location: Left arm, Patient Position: Sitting, Cuff Size: Adult)   Pulse 114   Temp 98.6 °F (37 °C) (Temporal)   Ht 162.6 cm (64\")   Wt 59 kg (130 lb)   SpO2 97%   BMI 22.31 kg/m²       Physical Exam  Constitutional:       Appearance: Normal appearance.   HENT:      Nose: Nose normal.      Mouth/Throat:      Mouth: Mucous membranes are moist.   Cardiovascular:      Rate and Rhythm: Normal rate and regular rhythm.      Pulses: Normal pulses.      Heart sounds: Normal heart sounds.   Pulmonary:      Effort: Pulmonary effort is normal.      Breath sounds: Normal breath " sounds.   Skin:     General: Skin is warm and dry.   Neurological:      General: No focal deficit present.      Mental Status: She is alert and oriented to person, place, and time.   Psychiatric:         Mood and Affect: Mood normal.         Behavior: Behavior normal.         Results    The following data was reviewed by: ROBI Milner on 12/18/2024:             Assessment and Plan        Assessment and Plan    Diagnoses and all orders for this visit:    1. Chills (Primary)  -     POCT SARS-CoV-2 Antigen JYOTI + Flu    2. Generalized body aches  -     POCT SARS-CoV-2 Antigen JYOTI + Flu    3. COVID-19  -     triamcinolone acetonide (KENALOG-40) injection 60 mg            I spent 30 minutes caring for Consuelo on this date of service. This time includes time spent by me in the following activities:preparing for the visit, reviewing tests, performing a medically appropriate examination and/or evaluation , counseling and educating the patient/family/caregiver, ordering medications, tests, or procedures, and documenting information in the medical record  Follow Up        Follow Up   No follow-ups on file.  Patient was given instructions and counseling regarding her condition or for health maintenance advice. Please see specific information pulled into the AVS if appropriate.    Answers submitted by the patient for this visit:  Primary Reason for Visit (Submitted on 12/18/2024)  What is the primary reason for your visit?: Ear Pain  Ear Pain Questionnaire (Submitted on 12/18/2024)  Chief Complaint: Ear pain  Affected ear: both  Chronicity: recurrent  Onset: in the past 7 days  Progression since onset: worsening  Frequency: constantly  Fever: unmeasured  Pain - numeric: 4/10  dizziness: Yes  cough: Yes  drainage: Yes  headaches: Yes  hearing loss: Yes  hoarse voice: Yes  neck pain: Yes  rash: No  rhinorrhea: Yes  sore throat: Yes  congestion: Yes  jaw pain: Yes  adenopathy: Yes  tinnitus: Yes  Treatments tried :  acetaminophen

## 2024-12-19 DIAGNOSIS — I10 PRIMARY HYPERTENSION: Chronic | ICD-10-CM

## 2024-12-19 RX ORDER — LIDOCAINE 50 MG/G
PATCH TOPICAL
Qty: 30 PATCH | Refills: 0 | Status: SHIPPED | OUTPATIENT
Start: 2024-12-19

## 2024-12-19 RX ORDER — ATORVASTATIN CALCIUM 40 MG/1
40 TABLET, FILM COATED ORAL NIGHTLY
Qty: 90 TABLET | Refills: 0 | Status: SHIPPED | OUTPATIENT
Start: 2024-12-19

## 2024-12-23 ENCOUNTER — TELEPHONE (OUTPATIENT)
Dept: FAMILY MEDICINE CLINIC | Facility: CLINIC | Age: 64
End: 2024-12-23
Payer: MEDICARE

## 2024-12-23 RX ORDER — CEFDINIR 300 MG/1
300 CAPSULE ORAL 2 TIMES DAILY
Qty: 14 CAPSULE | Refills: 0 | Status: SHIPPED | OUTPATIENT
Start: 2024-12-23

## 2024-12-23 NOTE — TELEPHONE ENCOUNTER
Caller: AldoAmericabelle Maye    Relationship: Self    Best call back number: [phone]     What medication are you requesting: ANTIBIOTIC    What are your current symptoms: HEAD CONGESTION (GREEN), DRAINAGE, COUGH, (POSITIVE FOR COVID 12/18)     Have you had these symptoms before:    [x] Yes  [] No    Have you been treated for these symptoms before:   [x] Yes  [] No    If a prescription is needed, what is your preferred pharmacy and phone number: [address] [phone] [facility] [phone]      Additional notes: PATIENT WAS SEEN IN OFFICE ON 12/18 BY ENRIKE -  NO ANTIBIOTIC PRESCRIBED -- PATIENT STATES SHE IS STILL HAVING CONGESTION THAT IS NOW GREEN IN COLOR..     PLEASE ADVISE

## 2024-12-30 RX ORDER — CEFDINIR 300 MG/1
300 CAPSULE ORAL 2 TIMES DAILY
Qty: 14 CAPSULE | Refills: 0 | OUTPATIENT
Start: 2024-12-30

## 2025-01-10 ENCOUNTER — CLINICAL SUPPORT (OUTPATIENT)
Dept: FAMILY MEDICINE CLINIC | Facility: CLINIC | Age: 65
End: 2025-01-10
Payer: MEDICARE

## 2025-01-10 DIAGNOSIS — J30.9 ALLERGIC RHINITIS, UNSPECIFIED SEASONALITY, UNSPECIFIED TRIGGER: Primary | ICD-10-CM

## 2025-01-10 PROCEDURE — 95117 IMMUNOTHERAPY INJECTIONS: CPT | Performed by: FAMILY MEDICINE

## 2025-01-10 NOTE — PROGRESS NOTES
History of Present Illness    Immunotherapy Pre-Injection Questionnaire:         Are you currently pregnant or been diagnosed with a new medical condition? No    1. Have you had increased asthmas symptoms (chest tightness, increased cough, wheezing, or shortness of breath) in the past week? No    2. Have you had increase allergy symptoms (itching eyes or nose, sneezing, runny nose, post-nasal drip, or throat-clearing) in the past week? No    3. Have you had a cold, respiratory tract infection, or flu-like symptoms in the past two weeks? No    4. Did you have any problems such as increased allergy or asthma symptoms, hives, or generalized itching within 12 hours or receiving your last injection? No    5. Are you on any new medications? New eye drops? No    6. Patient confirmed their name and birth date on allergy vials are correct. Yes     Assessment/Plan   Diagnoses and all orders for this visit:    1. Allergic rhinitis, unspecified seasonality, unspecified trigger (Primary)  -     patient supplied allergy injection  -     patient supplied allergy injection      Diagnoses and all orders for this visit                Karely Siddiqui MA 1/10/2025

## 2025-01-16 DIAGNOSIS — I10 PRIMARY HYPERTENSION: Chronic | ICD-10-CM

## 2025-01-16 DIAGNOSIS — F51.01 PRIMARY INSOMNIA: Chronic | ICD-10-CM

## 2025-01-16 RX ORDER — TEMAZEPAM 30 MG/1
CAPSULE ORAL
Qty: 30 CAPSULE | Refills: 5 | Status: SHIPPED | OUTPATIENT
Start: 2025-01-16

## 2025-01-16 RX ORDER — METOPROLOL SUCCINATE 50 MG/1
TABLET, EXTENDED RELEASE ORAL
Qty: 90 TABLET | Refills: 0 | Status: SHIPPED | OUTPATIENT
Start: 2025-01-16

## 2025-01-17 ENCOUNTER — CLINICAL SUPPORT (OUTPATIENT)
Dept: FAMILY MEDICINE CLINIC | Facility: CLINIC | Age: 65
End: 2025-01-17
Payer: MEDICARE

## 2025-01-17 DIAGNOSIS — Z79.899 MEDICATION MANAGEMENT: Primary | ICD-10-CM

## 2025-01-17 DIAGNOSIS — J30.9 ALLERGIC RHINITIS, UNSPECIFIED SEASONALITY, UNSPECIFIED TRIGGER: ICD-10-CM

## 2025-01-17 LAB
AMPHET+METHAMPHET UR QL: NEGATIVE
AMPHETAMINE INTERNAL CONTROL: ABNORMAL
AMPHETAMINES UR QL: NEGATIVE
BARBITURATE INTERNAL CONTROL: ABNORMAL
BARBITURATES UR QL SCN: NEGATIVE
BENZODIAZ UR QL SCN: POSITIVE
BENZODIAZEPINE INTERNAL CONTROL: ABNORMAL
BUPRENORPHINE INTERNAL CONTROL: ABNORMAL
BUPRENORPHINE SERPL-MCNC: NEGATIVE NG/ML
CANNABINOIDS SERPL QL: NEGATIVE
COCAINE INTERNAL CONTROL: ABNORMAL
COCAINE UR QL: NEGATIVE
EXPIRATION DATE: ABNORMAL
Lab: ABNORMAL
MDMA (ECSTASY) INTERNAL CONTROL: ABNORMAL
MDMA UR QL SCN: NEGATIVE
METHADONE INTERNAL CONTROL: ABNORMAL
METHADONE UR QL SCN: NEGATIVE
METHAMPHETAMINE INTERNAL CONTROL: ABNORMAL
MORPHINE INTERNAL CONTROL: ABNORMAL
MORPHINE/OPIATES SCREEN, URINE: NEGATIVE
OXYCODONE INTERNAL CONTROL: ABNORMAL
OXYCODONE UR QL SCN: NEGATIVE
PCP UR QL SCN: NEGATIVE
PHENCYCLIDINE INTERNAL CONTROL: ABNORMAL
THC INTERNAL CONTROL: ABNORMAL

## 2025-01-17 NOTE — PROGRESS NOTES
History of Present Illness    Immunotherapy Pre-Injection Questionnaire:         Are you currently pregnant or been diagnosed with a new medical condition? No    1. Have you had increased asthmas symptoms (chest tightness, increased cough, wheezing, or shortness of breath) in the past week? No    2. Have you had increase allergy symptoms (itching eyes or nose, sneezing, runny nose, post-nasal drip, or throat-clearing) in the past week? No    3. Have you had a cold, respiratory tract infection, or flu-like symptoms in the past two weeks? No    4. Did you have any problems such as increased allergy or asthma symptoms, hives, or generalized itching within 12 hours or receiving your last injection? No    5. Are you on any new medications? New eye drops? No    6. Patient confirmed their name and birth date on allergy vials are correct. Yes     Assessment/Plan   Diagnoses and all orders for this visit:    1. Medication management (Primary)  -     POC Medline 12 Panel Urine Drug Screen    2. Allergic rhinitis, unspecified seasonality, unspecified trigger  -     patient supplied allergy injection  -     patient supplied allergy injection      Diagnoses and all orders for this visit                Karely Siddiqui MA 1/17/2025

## 2025-01-22 ENCOUNTER — CLINICAL SUPPORT (OUTPATIENT)
Dept: FAMILY MEDICINE CLINIC | Facility: CLINIC | Age: 65
End: 2025-01-22
Payer: MEDICARE

## 2025-01-22 DIAGNOSIS — J30.9 ALLERGIC RHINITIS, UNSPECIFIED SEASONALITY, UNSPECIFIED TRIGGER: Primary | ICD-10-CM

## 2025-01-22 PROCEDURE — 95117 IMMUNOTHERAPY INJECTIONS: CPT | Performed by: FAMILY MEDICINE

## 2025-01-22 NOTE — PROGRESS NOTES
.  History of Present Illness    Immunotherapy Pre-Injection Questionnaire:         Are you currently pregnant or been diagnosed with a new medical condition? No    1. Have you had increased asthmas symptoms (chest tightness, increased cough, wheezing, or shortness of breath) in the past week? No    2. Have you had increase allergy symptoms (itching eyes or nose, sneezing, runny nose, post-nasal drip, or throat-clearing) in the past week? No    3. Have you had a cold, respiratory tract infection, or flu-like symptoms in the past two weeks? No    4. Did you have any problems such as increased allergy or asthma symptoms, hives, or generalized itching within 12 hours or receiving your last injection? No    5. Are you on any new medications? New eye drops? No    6. Patient confirmed their name and birth date on allergy vials are correct. Yes     Assessment/Plan   There are no diagnoses linked to this encounter.      Diagnoses and all orders for this visit                Veronica Villa MA 1/22/2025

## 2025-01-28 ENCOUNTER — CLINICAL SUPPORT (OUTPATIENT)
Dept: FAMILY MEDICINE CLINIC | Facility: CLINIC | Age: 65
End: 2025-01-28
Payer: MEDICARE

## 2025-01-28 DIAGNOSIS — J30.9 ALLERGIC RHINITIS, UNSPECIFIED SEASONALITY, UNSPECIFIED TRIGGER: Primary | ICD-10-CM

## 2025-01-28 PROCEDURE — 95117 IMMUNOTHERAPY INJECTIONS: CPT | Performed by: FAMILY MEDICINE

## 2025-01-28 NOTE — PROGRESS NOTES
History of Present Illness    Immunotherapy Pre-Injection Questionnaire:         Are you currently pregnant or been diagnosed with a new medical condition? No    1. Have you had increased asthmas symptoms (chest tightness, increased cough, wheezing, or shortness of breath) in the past week? No    2. Have you had increase allergy symptoms (itching eyes or nose, sneezing, runny nose, post-nasal drip, or throat-clearing) in the past week? No    3. Have you had a cold, respiratory tract infection, or flu-like symptoms in the past two weeks? No    4. Did you have any problems such as increased allergy or asthma symptoms, hives, or generalized itching within 12 hours or receiving your last injection? No    5. Are you on any new medications? New eye drops? No    6. Patient confirmed their name and birth date on allergy vials are correct. Yes     Assessment/Plan   Diagnoses and all orders for this visit:    1. Allergic rhinitis, unspecified seasonality, unspecified trigger (Primary)  -     patient supplied allergy injection  -     patient supplied allergy injection      Diagnoses and all orders for this visit                Karely Siddiqui MA 1/28/2025   “Patient's name, , procedure and correct site were confirmed during the [place] Timeout.”

## 2025-02-03 ENCOUNTER — CLINICAL SUPPORT (OUTPATIENT)
Dept: FAMILY MEDICINE CLINIC | Facility: CLINIC | Age: 65
End: 2025-02-03
Payer: MEDICARE

## 2025-02-03 DIAGNOSIS — J30.9 ALLERGIC RHINITIS, UNSPECIFIED SEASONALITY, UNSPECIFIED TRIGGER: Primary | ICD-10-CM

## 2025-02-03 PROCEDURE — 95117 IMMUNOTHERAPY INJECTIONS: CPT | Performed by: FAMILY MEDICINE

## 2025-02-03 NOTE — PROGRESS NOTES
History of Present Illness    Immunotherapy Pre-Injection Questionnaire:         Are you currently pregnant or been diagnosed with a new medical condition? No    1. Have you had increased asthmas symptoms (chest tightness, increased cough, wheezing, or shortness of breath) in the past week? No    2. Have you had increase allergy symptoms (itching eyes or nose, sneezing, runny nose, post-nasal drip, or throat-clearing) in the past week? No    3. Have you had a cold, respiratory tract infection, or flu-like symptoms in the past two weeks? No    4. Did you have any problems such as increased allergy or asthma symptoms, hives, or generalized itching within 12 hours or receiving your last injection? No    5. Are you on any new medications? New eye drops? No    6. Patient confirmed their name and birth date on allergy vials are correct. Yes     Assessment/Plan     Diagnoses and all orders for this visit        Rashmi Reno RN 2/3/2025

## 2025-02-06 ENCOUNTER — OFFICE VISIT (OUTPATIENT)
Dept: FAMILY MEDICINE CLINIC | Facility: CLINIC | Age: 65
End: 2025-02-06
Payer: MEDICARE

## 2025-02-06 VITALS
SYSTOLIC BLOOD PRESSURE: 136 MMHG | HEIGHT: 64 IN | WEIGHT: 133.8 LBS | OXYGEN SATURATION: 98 % | HEART RATE: 78 BPM | TEMPERATURE: 97 F | BODY MASS INDEX: 22.84 KG/M2 | RESPIRATION RATE: 16 BRPM | DIASTOLIC BLOOD PRESSURE: 75 MMHG

## 2025-02-06 DIAGNOSIS — J30.9 ALLERGIC RHINITIS, UNSPECIFIED SEASONALITY, UNSPECIFIED TRIGGER: ICD-10-CM

## 2025-02-06 DIAGNOSIS — I10 PRIMARY HYPERTENSION: Chronic | ICD-10-CM

## 2025-02-06 DIAGNOSIS — Z00.00 ENCOUNTER FOR SUBSEQUENT ANNUAL WELLNESS VISIT (AWV) IN MEDICARE PATIENT: Primary | ICD-10-CM

## 2025-02-06 DIAGNOSIS — F51.01 PRIMARY INSOMNIA: Chronic | ICD-10-CM

## 2025-02-06 DIAGNOSIS — E11.610 TYPE 2 DIABETES MELLITUS WITH DIABETIC NEUROPATHIC ARTHROPATHY, WITHOUT LONG-TERM CURRENT USE OF INSULIN: Chronic | ICD-10-CM

## 2025-02-06 DIAGNOSIS — E53.8 B12 DEFICIENCY: Chronic | ICD-10-CM

## 2025-02-06 PROCEDURE — G0009 ADMIN PNEUMOCOCCAL VACCINE: HCPCS | Performed by: FAMILY MEDICINE

## 2025-02-06 PROCEDURE — G0439 PPPS, SUBSEQ VISIT: HCPCS | Performed by: FAMILY MEDICINE

## 2025-02-06 PROCEDURE — 96160 PT-FOCUSED HLTH RISK ASSMT: CPT | Performed by: FAMILY MEDICINE

## 2025-02-06 PROCEDURE — 1125F AMNT PAIN NOTED PAIN PRSNT: CPT | Performed by: FAMILY MEDICINE

## 2025-02-06 PROCEDURE — 1159F MED LIST DOCD IN RCRD: CPT | Performed by: FAMILY MEDICINE

## 2025-02-06 PROCEDURE — 99213 OFFICE O/P EST LOW 20 MIN: CPT | Performed by: FAMILY MEDICINE

## 2025-02-06 PROCEDURE — 1160F RVW MEDS BY RX/DR IN RCRD: CPT | Performed by: FAMILY MEDICINE

## 2025-02-06 PROCEDURE — 3075F SYST BP GE 130 - 139MM HG: CPT | Performed by: FAMILY MEDICINE

## 2025-02-06 PROCEDURE — 90677 PCV20 VACCINE IM: CPT | Performed by: FAMILY MEDICINE

## 2025-02-06 PROCEDURE — 3078F DIAST BP <80 MM HG: CPT | Performed by: FAMILY MEDICINE

## 2025-02-06 RX ORDER — CEFDINIR 300 MG/1
300 CAPSULE ORAL 2 TIMES DAILY
Qty: 14 CAPSULE | Refills: 0 | Status: SHIPPED | OUTPATIENT
Start: 2025-02-06

## 2025-02-06 RX ORDER — BROMPHENIRAMINE MALEATE, PSEUDOEPHEDRINE HYDROCHLORIDE, AND DEXTROMETHORPHAN HYDROBROMIDE 2; 30; 10 MG/5ML; MG/5ML; MG/5ML
5 SYRUP ORAL 4 TIMES DAILY PRN
Qty: 118 ML | Refills: 0 | Status: SHIPPED | OUTPATIENT
Start: 2025-02-06

## 2025-02-06 NOTE — ASSESSMENT & PLAN NOTE
Diabetes is stable recheck a1c.   Medication changes per orders.  Diabetes will be reassessed in 3 months    Orders:    Hemoglobin A1c; Future    Comprehensive Metabolic Panel; Future    Lipid Panel; Future    Vitamin B12 & Folate; Future    Microalbumin / Creatinine Urine Ratio - Urine, Clean Catch; Future    CBC (No Diff); Future    TSH+Free T4; Future

## 2025-02-06 NOTE — PROGRESS NOTES
Subjective   The ABCs of the Annual Wellness Visit  Medicare Wellness Visit      Consuelo Carlson is a 64 y.o. patient who presents for a Medicare Wellness Visit.    The following portions of the patient's history were reviewed and   updated as appropriate: allergies, current medications, past family history, past medical history, past social history, past surgical history, and problem list.    Compared to one year ago, the patient's physical   health is the same.  Compared to one year ago, the patient's mental   health is the same.    Recent Hospitalizations:  She was not admitted to the hospital during the last year.     Current Medical Providers:  Patient Care Team:  Paul Choi DO as PCP - General (Family Medicine)  Harini Sood APRN as Nurse Practitioner (Endocrinology)    Outpatient Medications Prior to Visit   Medication Sig Dispense Refill    aspirin 81 MG EC tablet Take 1 tablet by mouth.      atorvastatin (LIPITOR) 40 MG tablet TAKE 1 TABLET BY MOUTH AT BEDTIME FOR CHOLESTEROL 90 tablet 0    cefdinir (OMNICEF) 300 MG capsule Take 1 capsule by mouth 2 (Two) Times a Day. 14 capsule 0    clobetasol (TEMOVATE) 0.05 % cream APPLY TO AFFECTED AREA(S) EVERY OTHER DAY AS DIRECTED 30 g 1    lidocaine (LIDODERM) 5 % APPLY ONE PATCH EVERY 12 HOURS AS NEEDED FOR PAIN 30 patch 0    metFORMIN ER (GLUCOPHAGE-XR) 500 MG 24 hr tablet Take 2 tablets by mouth Daily With Breakfast & Dinner for 180 days. (Patient taking differently: Take 2 tablets by mouth 2 (Two) Times a Day.) 360 tablet 1    metoprolol succinate XL (TOPROL-XL) 50 MG 24 hr tablet TAKE 1 TABLET BY MOUTH ONCE DAILY FOR BLOOD PRESSURE OR HEART 90 tablet 0    Multiple Vitamins-Minerals (EYE VITAMINS PO) Take 1 tablet by mouth Daily. HOLD PRIOR TO SURGERY      temazepam (RESTORIL) 30 MG capsule TAKE 1 CAPSULE BY MOUTH AT BEDTIME AS NEEDED FOR ANXIETY OR SLEEP 30 capsule 5    venlafaxine XR (EFFEXOR-XR) 150 MG 24 hr capsule TAKE 1 CAPSULE BY MOUTH  "ONCE DAILY 90 capsule 1     No facility-administered medications prior to visit.     No opioid medication identified on active medication list. I have reviewed chart for other potential  high risk medication/s and harmful drug interactions in the elderly.      Aspirin is on active medication list. Aspirin use is indicated based on review of current medical condition/s. Pros and cons of this therapy have been discussed today. Benefits of this medication outweigh potential harm.  Patient has been encouraged to continue taking this medication.  .      Patient Active Problem List   Diagnosis    Macromastia    Anxiety    Depression    Gastroesophageal reflux disease    HTN (hypertension)    Encounter for subsequent annual wellness visit (AWV) in Medicare patient    Insomnia    Low back pain, episodic    Degeneration of lumbar intervertebral disc    Lumbosacral radiculopathy at L4    Macular degeneration    Neuropathy    S/P lumbar discectomy    T2DM (type 2 diabetes mellitus)    Tachycardia    Mixed hyperlipidemia    Diabetic polyneuropathy associated with type 2 diabetes mellitus    Nocturnal hypoglycemia    Hyperglycemia due to diabetes mellitus    B12 deficiency    Allergic rhinitis     Advance Care Planning Advance Directive is not on file.  ACP discussion was declined by the patient. Patient does not have an advance directive, information provided.            Objective   Vitals:    02/06/25 1050 02/06/25 1053   BP: 140/78 136/75   BP Location: Left arm Right arm   Patient Position: Sitting Sitting   Cuff Size: Adult Adult   Pulse: 78    Resp: 16    Temp: 97 °F (36.1 °C)    SpO2: 98%    Weight: 60.7 kg (133 lb 12.8 oz)    Height: 162.6 cm (64\")    PainSc:   6    PainLoc: Back        Estimated body mass index is 22.97 kg/m² as calculated from the following:    Height as of this encounter: 162.6 cm (64\").    Weight as of this encounter: 60.7 kg (133 lb 12.8 oz).    BMI is within normal parameters. No other follow-up " for BMI required.           Does the patient have evidence of cognitive impairment? No  Lab Results   Component Value Date    HGBA1C 7.9 (A) 2024                                                                                                Health  Risk Assessment    Smoking Status:  Social History     Tobacco Use   Smoking Status Never    Passive exposure: Never   Smokeless Tobacco Never     Alcohol Consumption:  Social History     Substance and Sexual Activity   Alcohol Use No       Fall Risk Screen  STEADI Fall Risk Assessment was completed, and patient is at LOW risk for falls.Assessment completed on:2025    Depression Screening   Little interest or pleasure in doing things? Not at all   Feeling down, depressed, or hopeless? Not at all   PHQ-2 Total Score 0      Health Habits and Functional and Cognitive Screenin/6/2025    10:48 AM   Functional & Cognitive Status   Do you have difficulty preparing food and eating? No   Do you have difficulty bathing yourself, getting dressed or grooming yourself? No   Do you have difficulty using the toilet? No   Do you have difficulty moving around from place to place? No   Do you have trouble with steps or getting out of a bed or a chair? Yes   Current Diet Well Balanced Diet   Dental Exam Up to date   Eye Exam Up to date   Exercise (times per week) 3 times per week   Current Exercises Include Walking   Do you need help using the phone?  No   Are you deaf or do you have serious difficulty hearing?  Yes   Do you need help to go to places out of walking distance? No   Do you need help shopping? No   Do you need help preparing meals?  No   Do you need help with housework?  No   Do you need help with laundry? No   Do you need help taking your medications? No   Do you need help managing money? No   Do you ever drive or ride in a car without wearing a seat belt? No   Have you felt unusual stress, anger or loneliness in the last month? No   Who do you live with?  Spouse   If you need help, do you have trouble finding someone available to you? No   Have you been bothered in the last four weeks by sexual problems? No   Do you have difficulty concentrating, remembering or making decisions? No           Age-appropriate Screening Schedule:  Refer to the list below for future screening recommendations based on patient's age, sex and/or medical conditions. Orders for these recommended tests are listed in the plan section. The patient has been provided with a written plan.    Health Maintenance List  Health Maintenance   Topic Date Due    Pneumococcal Vaccine 65+ (1 of 2 - PCV) Never done    ZOSTER VACCINE (1 of 2) Never done    DIABETIC EYE EXAM  06/28/2024    COVID-19 Vaccine (4 - 2024-25 season) 09/01/2024    TDAP/TD VACCINES (1 - Tdap) 05/16/2025 (Originally 4/4/1979)    HEMOGLOBIN A1C  05/11/2025    LIPID PANEL  05/21/2025    DXA SCAN  07/31/2025    URINE MICROALBUMIN  11/11/2025    DIABETIC FOOT EXAM  12/18/2025    COLORECTAL CANCER SCREENING  01/17/2026    ANNUAL WELLNESS VISIT  02/06/2026    PAP SMEAR  05/08/2026    MAMMOGRAM  05/10/2026    HEPATITIS C SCREENING  Completed    INFLUENZA VACCINE  Completed                                                                                                                                                CMS Preventative Services Quick Reference  Risk Factors Identified During Encounter  reviewed    The above risks/problems have been discussed with the patient.  Pertinent information has been shared with the patient in the After Visit Summary.  An After Visit Summary and PPPS were made available to the patient.    Follow Up:   Next Medicare Wellness visit to be scheduled in 1 year.         Additional E&M Note during same encounter follows:  Patient has additional, significant, and separately identifiable condition(s)/problem(s) that require work above and beyond the Medicare Wellness Visit     Chief Complaint  Medicare  "Wellness-subsequent (Pt complains of nasal congestion- but no other symptoms. )    Subjective   HPI  Consuelo is also being seen today for additional medical problem/s.          Patient presents for annual wellness visit also having complaints of nasal congestion and sinus pain pressure ongoing and sick contacts.  No fever loss of smell or taste taking over-the-counter medicines to help with symptoms    Last A1c 11/2024 was 7.9 more elevated than previous, labs prior to that 5/21/2024 cholesterol was excellent CMP with no abnormalities TSH T4 normal range blood count no anemia        Objective   Vital Signs:  /75 (BP Location: Right arm, Patient Position: Sitting, Cuff Size: Adult)   Pulse 78   Temp 97 °F (36.1 °C)   Resp 16   Ht 162.6 cm (64\")   Wt 60.7 kg (133 lb 12.8 oz)   SpO2 98%   BMI 22.97 kg/m²   Physical Exam  Vitals reviewed.   Constitutional:       Appearance: Normal appearance. She is well-developed.   HENT:      Head: Normocephalic and atraumatic.      Right Ear: External ear normal.      Left Ear: External ear normal.      Nose: Nose normal. Congestion and rhinorrhea present.      Mouth/Throat:      Pharynx: Posterior oropharyngeal erythema present.   Eyes:      Conjunctiva/sclera: Conjunctivae normal.      Pupils: Pupils are equal, round, and reactive to light.   Cardiovascular:      Rate and Rhythm: Normal rate.   Pulmonary:      Effort: Pulmonary effort is normal.      Breath sounds: Normal breath sounds.   Abdominal:      General: There is no distension.   Skin:     General: Skin is warm and dry.   Neurological:      Mental Status: She is alert and oriented to person, place, and time. Mental status is at baseline.   Psychiatric:         Mood and Affect: Mood and affect normal.         Behavior: Behavior normal.         Thought Content: Thought content normal.         Judgment: Judgment normal.         The following data was reviewed by: Paul Choi DO on 02/06/2025:    Common labs  "         5/21/2024    16:15 8/15/2024    10:29 11/11/2024    11:19 11/11/2024    13:54   Common Labs   Glucose 126       BUN 10       Creatinine 0.67       Sodium 142       Potassium 4.0       Chloride 101       Calcium 9.7       Albumin 4.5       Total Bilirubin 0.4       Alkaline Phosphatase 88       AST (SGOT) 17       ALT (SGPT) 11       Total Cholesterol 124       Triglycerides 121       HDL Cholesterol 46       LDL Cholesterol  56       Hemoglobin A1C  5.8  7.9     Microalbumin, Urine    2.2              Assessment and Plan Additional age appropriate preventative wellness advice topics were discussed during today's preventative wellness exam(some topics already addressed during AWV portion of the note above):    Physical Activity: Advised cardiovascular activity 150 minutes per week as tolerated. (example brisk walk for 30 minutes, 5 days a week).   Nutrition: Discussed nutrition plan with patient. Information shared in after visit summary. Goal is for a well balanced diet to enhance overall health.           Encounter for subsequent annual wellness visit (AWV) in Medicare patient         Allergic rhinitis, unspecified seasonality, unspecified trigger         Primary insomnia         Primary hypertension  Hypertension is stable and controlled  Continue current treatment regimen.  Blood pressure will be reassessed in 6 months.         B12 deficiency         Type 2 diabetes mellitus with diabetic neuropathic arthropathy, without long-term current use of insulin  Diabetes is  stable recheck a1c .   Medication changes per orders.  Diabetes will be reassessed in 3 months    Orders:    Hemoglobin A1c; Future    Comprehensive Metabolic Panel; Future    Lipid Panel; Future    Vitamin B12 & Folate; Future    Microalbumin / Creatinine Urine Ratio - Urine, Clean Catch; Future    CBC (No Diff); Future    TSH+Free T4; Future       Reviewed AWV recommendations and ordered as appropriate, follow up annually for review, sooner  if concerns    No depression, falls, dementia symptoms or other  Risk and home safety assessment good    Followed up on chronic conditions and ordered refills, appropriate monitoring labs additionally as needed every 6 months or sooner if indicated, controlled stable    Follow up at least every 3-6 months for chronic conditions and as scheduled with appropriate specialists as indicated otherwise    Antibiotic allergy medicine for symptoms today, sinusitis    Recheck labs, A1c goal <7    Pneumonia vaccine today        Follow Up   Return in about 4 months (around 6/6/2025), or if symptoms worsen or fail to improve, for Next scheduled follow up.  Patient was given instructions and counseling regarding her condition or for health maintenance advice. Please see specific information pulled into the AVS if appropriate.

## 2025-02-10 DIAGNOSIS — F51.01 PRIMARY INSOMNIA: Chronic | ICD-10-CM

## 2025-02-10 DIAGNOSIS — F32.5 MAJOR DEPRESSIVE DISORDER IN FULL REMISSION, UNSPECIFIED WHETHER RECURRENT: Chronic | ICD-10-CM

## 2025-02-10 RX ORDER — VENLAFAXINE HYDROCHLORIDE 150 MG/1
150 CAPSULE, EXTENDED RELEASE ORAL DAILY
Qty: 90 CAPSULE | Refills: 0 | Status: SHIPPED | OUTPATIENT
Start: 2025-02-10

## 2025-02-18 ENCOUNTER — CLINICAL SUPPORT (OUTPATIENT)
Dept: FAMILY MEDICINE CLINIC | Facility: CLINIC | Age: 65
End: 2025-02-18
Payer: MEDICARE

## 2025-02-18 DIAGNOSIS — J30.9 ALLERGIC RHINITIS, UNSPECIFIED SEASONALITY, UNSPECIFIED TRIGGER: Primary | ICD-10-CM

## 2025-02-18 PROCEDURE — 95117 IMMUNOTHERAPY INJECTIONS: CPT | Performed by: FAMILY MEDICINE

## 2025-02-18 NOTE — PROGRESS NOTES
History of Present Illness    Immunotherapy Pre-Injection Questionnaire:         Are you currently pregnant or been diagnosed with a new medical condition? No    1. Have you had increased asthmas symptoms (chest tightness, increased cough, wheezing, or shortness of breath) in the past week? No    2. Have you had increase allergy symptoms (itching eyes or nose, sneezing, runny nose, post-nasal drip, or throat-clearing) in the past week? No    3. Have you had a cold, respiratory tract infection, or flu-like symptoms in the past two weeks? No    4. Did you have any problems such as increased allergy or asthma symptoms, hives, or generalized itching within 12 hours or receiving your last injection? No    5. Are you on any new medications? New eye drops? No    6. Patient confirmed their name and birth date on allergy vials are correct. Yes     Assessment/Plan   Diagnoses and all orders for this visit:    1. Allergic rhinitis, unspecified seasonality, unspecified trigger (Primary)  -     patient supplied allergy injection  -     patient supplied allergy injection      Diagnoses and all orders for this visit                Karely Siddiqui MA 2/18/2025

## 2025-02-21 ENCOUNTER — TELEPHONE (OUTPATIENT)
Dept: FAMILY MEDICINE CLINIC | Facility: CLINIC | Age: 65
End: 2025-02-21
Payer: MEDICARE

## 2025-02-21 RX ORDER — CEFDINIR 300 MG/1
300 CAPSULE ORAL 2 TIMES DAILY
Qty: 14 CAPSULE | Refills: 0 | Status: SHIPPED | OUTPATIENT
Start: 2025-02-21

## 2025-02-21 NOTE — TELEPHONE ENCOUNTER
"    Caller: Dell Carlson \"Gene\"    Relationship: Emergency Contact    Best call back number: [phone]    What medication are you requesting: ANTIBIOTIC    What are your current symptoms: N/A    How long have you been experiencing symptoms: N/A    Have you had these symptoms before:    [] Yes  [] No    Have you been treated for these symptoms before:   [] Yes  [] No    If a prescription is needed, what is your preferred pharmacy and phone number: [address] [phone] [facility] [phone] [facility]     Additional notes:PATIENT WAS SEEN IN OFFICE FOR CONGESTION AND ILLNESS. THE ILLNESS IS BACK. PLEASE SEND NEW PRESCRIPTION TO PHARMACY ASAP TODAY. IF THIS IS NOT POSSIBLE WITHOUT AN APPT PLEASE CALL PATIENT AND LET HER KNOW. PLEASE HAVE ANOTHER PROVIDER CALL THIS IN IF POSSIBLE. IF THIS IS NOT POSSIBLE PLEASE CALL PATIENT AND LET HER KNOW.        "

## 2025-02-21 NOTE — TELEPHONE ENCOUNTER
"  Caller: Jessica Carlson \"Gene\"    Relationship: Emergency Contact    Best call back number: [phone]    What was the call regarding: JESSICA STATES HE WOULD LIKE A CALL REGARDING THIS ISSUE FROM SOMEONE IN THE OFFICE.   "

## 2025-02-21 NOTE — TELEPHONE ENCOUNTER
Pt called into clinic requesting refill for cefdinir d/t persistent symptoms. Refill was requested and approved. Sent to  pharmacy.

## 2025-03-03 ENCOUNTER — LAB (OUTPATIENT)
Dept: LAB | Facility: HOSPITAL | Age: 65
End: 2025-03-03
Payer: MEDICARE

## 2025-03-03 ENCOUNTER — OFFICE VISIT (OUTPATIENT)
Dept: FAMILY MEDICINE CLINIC | Facility: CLINIC | Age: 65
End: 2025-03-03
Payer: MEDICARE

## 2025-03-03 VITALS
TEMPERATURE: 97.2 F | SYSTOLIC BLOOD PRESSURE: 138 MMHG | HEIGHT: 64 IN | DIASTOLIC BLOOD PRESSURE: 79 MMHG | BODY MASS INDEX: 20.66 KG/M2 | WEIGHT: 121 LBS | RESPIRATION RATE: 16 BRPM | HEART RATE: 87 BPM | OXYGEN SATURATION: 99 %

## 2025-03-03 DIAGNOSIS — F32.5 MAJOR DEPRESSIVE DISORDER IN FULL REMISSION, UNSPECIFIED WHETHER RECURRENT: Chronic | ICD-10-CM

## 2025-03-03 DIAGNOSIS — R09.81 NASAL CONGESTION: ICD-10-CM

## 2025-03-03 DIAGNOSIS — E11.610 TYPE 2 DIABETES MELLITUS WITH DIABETIC NEUROPATHIC ARTHROPATHY, WITHOUT LONG-TERM CURRENT USE OF INSULIN: Chronic | ICD-10-CM

## 2025-03-03 DIAGNOSIS — J01.10 ACUTE NON-RECURRENT FRONTAL SINUSITIS: Primary | ICD-10-CM

## 2025-03-03 DIAGNOSIS — F51.01 PRIMARY INSOMNIA: Chronic | ICD-10-CM

## 2025-03-03 DIAGNOSIS — E78.2 MIXED HYPERLIPIDEMIA: Chronic | ICD-10-CM

## 2025-03-03 DIAGNOSIS — I10 PRIMARY HYPERTENSION: Chronic | ICD-10-CM

## 2025-03-03 LAB
ALBUMIN SERPL-MCNC: 4.5 G/DL (ref 3.5–5.2)
ALBUMIN UR-MCNC: 1.9 MG/DL
ALBUMIN/GLOB SERPL: 1.6 G/DL
ALP SERPL-CCNC: 99 U/L (ref 39–117)
ALT SERPL W P-5'-P-CCNC: 24 U/L (ref 1–33)
ANION GAP SERPL CALCULATED.3IONS-SCNC: 11.8 MMOL/L (ref 5–15)
AST SERPL-CCNC: 23 U/L (ref 1–32)
BILIRUB SERPL-MCNC: 0.7 MG/DL (ref 0–1.2)
BUN SERPL-MCNC: 13 MG/DL (ref 8–23)
BUN/CREAT SERPL: 16 (ref 7–25)
CALCIUM SPEC-SCNC: 9.4 MG/DL (ref 8.6–10.5)
CHLORIDE SERPL-SCNC: 100 MMOL/L (ref 98–107)
CHOLEST SERPL-MCNC: 135 MG/DL (ref 0–200)
CO2 SERPL-SCNC: 27.2 MMOL/L (ref 22–29)
CREAT SERPL-MCNC: 0.81 MG/DL (ref 0.57–1)
CREAT UR-MCNC: 148.1 MG/DL
DEPRECATED RDW RBC AUTO: 39.9 FL (ref 37–54)
EGFRCR SERPLBLD CKD-EPI 2021: 80.2 ML/MIN/1.73
ERYTHROCYTE [DISTWIDTH] IN BLOOD BY AUTOMATED COUNT: 12.9 % (ref 12.3–15.4)
EXPIRATION DATE: ABNORMAL
EXPIRATION DATE: NORMAL
FLUAV AG UPPER RESP QL IA.RAPID: NOT DETECTED
FLUBV AG UPPER RESP QL IA.RAPID: NOT DETECTED
FOLATE SERPL-MCNC: 19.1 NG/ML (ref 4.78–24.2)
GLOBULIN UR ELPH-MCNC: 2.8 GM/DL
GLUCOSE SERPL-MCNC: 130 MG/DL (ref 65–99)
HBA1C MFR BLD: 6.8 % (ref 4.5–5.7)
HBA1C MFR BLD: 6.9 % (ref 4.8–5.6)
HCT VFR BLD AUTO: 44.3 % (ref 34–46.6)
HDLC SERPL-MCNC: 36 MG/DL (ref 40–60)
HGB BLD-MCNC: 15.1 G/DL (ref 12–15.9)
INTERNAL CONTROL: NORMAL
LDLC SERPL CALC-MCNC: 54 MG/DL (ref 0–100)
LDLC/HDLC SERPL: 1.16 {RATIO}
Lab: ABNORMAL
Lab: NORMAL
MCH RBC QN AUTO: 29.4 PG (ref 26.6–33)
MCHC RBC AUTO-ENTMCNC: 34.1 G/DL (ref 31.5–35.7)
MCV RBC AUTO: 86.2 FL (ref 79–97)
MICROALBUMIN/CREAT UR: 12.8 MG/G (ref 0–29)
PLATELET # BLD AUTO: 228 10*3/MM3 (ref 140–450)
PMV BLD AUTO: 10.4 FL (ref 6–12)
POTASSIUM SERPL-SCNC: 3.7 MMOL/L (ref 3.5–5.2)
PROT SERPL-MCNC: 7.3 G/DL (ref 6–8.5)
RBC # BLD AUTO: 5.14 10*6/MM3 (ref 3.77–5.28)
SARS-COV-2 AG UPPER RESP QL IA.RAPID: NOT DETECTED
SODIUM SERPL-SCNC: 139 MMOL/L (ref 136–145)
T4 FREE SERPL-MCNC: 1.55 NG/DL (ref 0.92–1.68)
TRIGL SERPL-MCNC: 286 MG/DL (ref 0–150)
TSH SERPL DL<=0.05 MIU/L-ACNC: 2.89 UIU/ML (ref 0.27–4.2)
VIT B12 BLD-MCNC: 442 PG/ML (ref 211–946)
VLDLC SERPL-MCNC: 45 MG/DL (ref 5–40)
WBC NRBC COR # BLD AUTO: 6.41 10*3/MM3 (ref 3.4–10.8)

## 2025-03-03 PROCEDURE — 84443 ASSAY THYROID STIM HORMONE: CPT

## 2025-03-03 PROCEDURE — 84439 ASSAY OF FREE THYROXINE: CPT

## 2025-03-03 PROCEDURE — 82570 ASSAY OF URINE CREATININE: CPT | Performed by: FAMILY MEDICINE

## 2025-03-03 PROCEDURE — 82043 UR ALBUMIN QUANTITATIVE: CPT | Performed by: FAMILY MEDICINE

## 2025-03-03 PROCEDURE — 82607 VITAMIN B-12: CPT

## 2025-03-03 PROCEDURE — 82746 ASSAY OF FOLIC ACID SERUM: CPT

## 2025-03-03 PROCEDURE — 80053 COMPREHEN METABOLIC PANEL: CPT

## 2025-03-03 PROCEDURE — 36415 COLL VENOUS BLD VENIPUNCTURE: CPT

## 2025-03-03 PROCEDURE — 80061 LIPID PANEL: CPT

## 2025-03-03 PROCEDURE — 83036 HEMOGLOBIN GLYCOSYLATED A1C: CPT

## 2025-03-03 PROCEDURE — 85027 COMPLETE CBC AUTOMATED: CPT

## 2025-03-03 RX ORDER — TRIAMCINOLONE ACETONIDE 40 MG/ML
60 INJECTION, SUSPENSION INTRA-ARTICULAR; INTRAMUSCULAR ONCE
Status: COMPLETED | OUTPATIENT
Start: 2025-03-03 | End: 2025-03-03

## 2025-03-03 RX ORDER — PREDNISONE 20 MG/1
20 TABLET ORAL 2 TIMES DAILY
Qty: 14 TABLET | Refills: 0 | Status: SHIPPED | OUTPATIENT
Start: 2025-03-03

## 2025-03-03 RX ADMIN — TRIAMCINOLONE ACETONIDE 60 MG: 40 INJECTION, SUSPENSION INTRA-ARTICULAR; INTRAMUSCULAR at 10:27

## 2025-03-10 ENCOUNTER — CLINICAL SUPPORT (OUTPATIENT)
Dept: FAMILY MEDICINE CLINIC | Facility: CLINIC | Age: 65
End: 2025-03-10
Payer: MEDICARE

## 2025-03-10 DIAGNOSIS — J30.9 ALLERGIC RHINITIS, UNSPECIFIED SEASONALITY, UNSPECIFIED TRIGGER: Primary | ICD-10-CM

## 2025-03-10 PROBLEM — J01.10 ACUTE NON-RECURRENT FRONTAL SINUSITIS: Status: ACTIVE | Noted: 2025-03-10

## 2025-03-10 PROCEDURE — 95117 IMMUNOTHERAPY INJECTIONS: CPT | Performed by: FAMILY MEDICINE

## 2025-03-10 NOTE — PROGRESS NOTES
History of Present Illness    Immunotherapy Pre-Injection Questionnaire:         Are you currently pregnant or been diagnosed with a new medical condition? No    1. Have you had increased asthmas symptoms (chest tightness, increased cough, wheezing, or shortness of breath) in the past week? No    2. Have you had increase allergy symptoms (itching eyes or nose, sneezing, runny nose, post-nasal drip, or throat-clearing) in the past week? No    3. Have you had a cold, respiratory tract infection, or flu-like symptoms in the past two weeks? No    4. Did you have any problems such as increased allergy or asthma symptoms, hives, or generalized itching within 12 hours or receiving your last injection? No    5. Are you on any new medications? New eye drops? No    6. Patient confirmed their name and birth date on allergy vials are correct. Yes     Assessment/Plan   Diagnoses and all orders for this visit:    1. Allergic rhinitis, unspecified seasonality, unspecified trigger (Primary)  -     patient supplied allergy injection  -     patient supplied allergy injection      Diagnoses and all orders for this visit                Karely Siddiqui MA 3/10/2025

## 2025-03-11 NOTE — PROGRESS NOTES
"Chief Complaint  Nasal Congestion and Cough    Subjective          Consuleo Carlson presents to [facility]  Cough      Presents with cough, congestion, chronic allergy symptoms and drainage, recently more prominent in chest causing cough suggestive of bronchitis, treated recently for sinusitis and advised medicines or suggested over the counter remedies for symptoms if indicated especially with weather and seasons change      Objective   Vital Signs:   /79 (BP Location: Left arm, Patient Position: Sitting, Cuff Size: Adult)   Pulse 87   Temp 97.2 °F (36.2 °C)   Resp 16   Ht 162.6 cm (64\")   Wt 54.9 kg (121 lb)   SpO2 99%   BMI 20.77 kg/m²     BMI is within normal parameters. No other follow-up for BMI required.      Physical Exam  Vitals reviewed.   Constitutional:       Appearance: Normal appearance. She is well-developed.   HENT:      Head: Normocephalic and atraumatic.      Right Ear: External ear normal.      Left Ear: External ear normal.      Nose: Nose normal. Congestion and rhinorrhea present.   Eyes:      Conjunctiva/sclera: Conjunctivae normal.      Pupils: Pupils are equal, round, and reactive to light.   Cardiovascular:      Rate and Rhythm: Normal rate.   Pulmonary:      Effort: Pulmonary effort is normal.      Breath sounds: Normal breath sounds.   Abdominal:      General: There is no distension.   Skin:     General: Skin is warm and dry.   Neurological:      Mental Status: She is alert and oriented to person, place, and time. Mental status is at baseline.   Psychiatric:         Mood and Affect: Mood and affect normal.         Behavior: Behavior normal.         Thought Content: Thought content normal.         Judgment: Judgment normal.          Result Review :   The following data was reviewed by: Paul Choi DO on 03/03/2025:  Common labs          8/15/2024    10:29 11/11/2024    11:19 11/11/2024    13:54 3/3/2025    10:08 3/3/2025    10:25 3/3/2025 "    10:37   Common Labs   Glucose      130    BUN      13    Creatinine      0.81    Sodium      139    Potassium      3.7    Chloride      100    Calcium      9.4    Albumin      4.5    Total Bilirubin      0.7    Alkaline Phosphatase      99    AST (SGOT)      23    ALT (SGPT)      24    WBC      6.41    Hemoglobin      15.1    Hematocrit      44.3    Platelets      228    Total Cholesterol      135    Triglycerides      286    HDL Cholesterol      36    LDL Cholesterol       54    Hemoglobin A1C 5.8  7.9    6.8  6.90    Microalbumin, Urine   2.2  1.9                      Assessment and Plan    Diagnoses and all orders for this visit:    1. Acute non-recurrent frontal sinusitis (Primary)    2. Type 2 diabetes mellitus with diabetic neuropathic arthropathy, without long-term current use of insulin  -     POC Glycosylated Hemoglobin (Hb A1C)  -     Microalbumin / Creatinine Urine Ratio - Urine, Clean Catch    3. Nasal congestion  -     POCT SARS-CoV-2 + Flu Antigen JYOTI  -     triamcinolone acetonide (KENALOG-40) injection 60 mg    4. Major depressive disorder in full remission, unspecified whether recurrent    5. Primary insomnia    6. Primary hypertension    7. Mixed hyperlipidemia    Other orders  -     predniSONE (DELTASONE) 20 MG tablet; Take 1 tablet by mouth 2 (Two) Times a Day.  Dispense: 14 tablet; Refill: 0      Treat with steroids, renew or repeat antibiotics if indicated, steroid injection today additionally    Follow up on chronic conditions otherwise as scheduled    BP controlled <140/90 continue medicines    Continue restoril for insomnia, rizwan reviewed contract on file and UDS up to date    Depression anxiety controlled continue medicine as prescribed, no SI/HI    Continue metformin for diabetes and manage diet, monitor glucose at home A1c <7  Urine micro today to update chart and screening nephropathy    Continue lipitor as prescribed goal LDL <70         Follow Up   Return if symptoms worsen or  fail to improve, for Next scheduled follow up, Recheck.  Patient was given instructions and counseling regarding her condition or for health maintenance advice. Please see specific information pulled into the AVS if appropriate.     Transcribed from ambient dictation for Paul Choi DO by Paul Choi DO.  03/10/25   20:54 EDT

## 2025-03-17 ENCOUNTER — CLINICAL SUPPORT (OUTPATIENT)
Dept: FAMILY MEDICINE CLINIC | Facility: CLINIC | Age: 65
End: 2025-03-17
Payer: MEDICARE

## 2025-03-17 DIAGNOSIS — J30.9 ALLERGIC RHINITIS, UNSPECIFIED SEASONALITY, UNSPECIFIED TRIGGER: Primary | ICD-10-CM

## 2025-03-17 PROCEDURE — 95117 IMMUNOTHERAPY INJECTIONS: CPT | Performed by: FAMILY MEDICINE

## 2025-03-17 NOTE — PROGRESS NOTES
History of Present Illness    Immunotherapy Pre-Injection Questionnaire:         Are you currently pregnant or been diagnosed with a new medical condition? No    1. Have you had increased asthmas symptoms (chest tightness, increased cough, wheezing, or shortness of breath) in the past week? No    2. Have you had increase allergy symptoms (itching eyes or nose, sneezing, runny nose, post-nasal drip, or throat-clearing) in the past week? No    3. Have you had a cold, respiratory tract infection, or flu-like symptoms in the past two weeks? No    4. Did you have any problems such as increased allergy or asthma symptoms, hives, or generalized itching within 12 hours or receiving your last injection? No    5. Are you on any new medications? New eye drops? No    6. Patient confirmed their name and birth date on allergy vials are correct. Yes     Assessment/Plan   Diagnoses and all orders for this visit:    1. Allergic rhinitis, unspecified seasonality, unspecified trigger (Primary)  -     patient supplied allergy injection  -     patient supplied allergy injection      Diagnoses and all orders for this visit                Karely Hickey MA 3/17/2025

## 2025-03-24 ENCOUNTER — CLINICAL SUPPORT (OUTPATIENT)
Dept: FAMILY MEDICINE CLINIC | Facility: CLINIC | Age: 65
End: 2025-03-24
Payer: MEDICARE

## 2025-03-24 ENCOUNTER — TELEPHONE (OUTPATIENT)
Dept: FAMILY MEDICINE CLINIC | Facility: CLINIC | Age: 65
End: 2025-03-24

## 2025-03-24 DIAGNOSIS — J30.9 ALLERGIC RHINITIS, UNSPECIFIED SEASONALITY, UNSPECIFIED TRIGGER: Primary | ICD-10-CM

## 2025-03-24 DIAGNOSIS — I10 PRIMARY HYPERTENSION: Chronic | ICD-10-CM

## 2025-03-24 DIAGNOSIS — M54.17 LUMBOSACRAL RADICULOPATHY AT L4: ICD-10-CM

## 2025-03-24 DIAGNOSIS — M51.362 DEGENERATION OF INTERVERTEBRAL DISC OF LUMBAR REGION WITH DISCOGENIC BACK PAIN AND LOWER EXTREMITY PAIN: ICD-10-CM

## 2025-03-24 DIAGNOSIS — Z98.890 S/P LUMBAR DISCECTOMY: Primary | ICD-10-CM

## 2025-03-24 PROCEDURE — 95117 IMMUNOTHERAPY INJECTIONS: CPT | Performed by: FAMILY MEDICINE

## 2025-03-24 RX ORDER — ATORVASTATIN CALCIUM 40 MG/1
40 TABLET, FILM COATED ORAL NIGHTLY
Qty: 90 TABLET | Refills: 0 | Status: SHIPPED | OUTPATIENT
Start: 2025-03-24

## 2025-03-24 NOTE — TELEPHONE ENCOUNTER
Patient would like a referral to neurosurgery to see Dr. Ricardo, patient had a neck surgery in 2003 and their doctor retired and would like to see dr ricardo. Thanks

## 2025-03-24 NOTE — PROGRESS NOTES
History of Present Illness    Immunotherapy Pre-Injection Questionnaire:         Are you currently pregnant or been diagnosed with a new medical condition? No    1. Have you had increased asthmas symptoms (chest tightness, increased cough, wheezing, or shortness of breath) in the past week? No    2. Have you had increase allergy symptoms (itching eyes or nose, sneezing, runny nose, post-nasal drip, or throat-clearing) in the past week? No    3. Have you had a cold, respiratory tract infection, or flu-like symptoms in the past two weeks? No    4. Did you have any problems such as increased allergy or asthma symptoms, hives, or generalized itching within 12 hours or receiving your last injection? No    5. Are you on any new medications? New eye drops? No    6. Patient confirmed their name and birth date on allergy vials are correct. Yes     Assessment/Plan   Diagnoses and all orders for this visit:    1. Allergic rhinitis, unspecified seasonality, unspecified trigger (Primary)  -     patient supplied allergy injection  -     patient supplied allergy injection      Diagnoses and all orders for this visit                Karely Hickey MA 3/24/2025

## 2025-03-31 ENCOUNTER — CLINICAL SUPPORT (OUTPATIENT)
Dept: FAMILY MEDICINE CLINIC | Facility: CLINIC | Age: 65
End: 2025-03-31
Payer: MEDICARE

## 2025-03-31 DIAGNOSIS — J30.9 ALLERGIC RHINITIS, UNSPECIFIED SEASONALITY, UNSPECIFIED TRIGGER: Primary | ICD-10-CM

## 2025-03-31 PROCEDURE — 95117 IMMUNOTHERAPY INJECTIONS: CPT | Performed by: FAMILY MEDICINE

## 2025-03-31 NOTE — PROGRESS NOTES
History of Present Illness    Immunotherapy Pre-Injection Questionnaire:         Are you currently pregnant or been diagnosed with a new medical condition? No    1. Have you had increased asthmas symptoms (chest tightness, increased cough, wheezing, or shortness of breath) in the past week? No    2. Have you had increase allergy symptoms (itching eyes or nose, sneezing, runny nose, post-nasal drip, or throat-clearing) in the past week? No    3. Have you had a cold, respiratory tract infection, or flu-like symptoms in the past two weeks? No    4. Did you have any problems such as increased allergy or asthma symptoms, hives, or generalized itching within 12 hours or receiving your last injection? No    5. Are you on any new medications? New eye drops? No    6. Patient confirmed their name and birth date on allergy vials are correct. Yes     Assessment/Plan     Diagnoses and all orders for this visit      Rashmi Griffith RN 3/31/2025

## 2025-04-07 ENCOUNTER — CLINICAL SUPPORT (OUTPATIENT)
Dept: FAMILY MEDICINE CLINIC | Facility: CLINIC | Age: 65
End: 2025-04-07
Payer: MEDICARE

## 2025-04-07 ENCOUNTER — TELEPHONE (OUTPATIENT)
Dept: OBSTETRICS AND GYNECOLOGY | Facility: CLINIC | Age: 65
End: 2025-04-07
Payer: MEDICARE

## 2025-04-07 DIAGNOSIS — Z78.0 POST-MENOPAUSAL: Primary | ICD-10-CM

## 2025-04-07 DIAGNOSIS — J30.9 ALLERGIC RHINITIS, UNSPECIFIED SEASONALITY, UNSPECIFIED TRIGGER: Primary | ICD-10-CM

## 2025-04-07 PROCEDURE — 95117 IMMUNOTHERAPY INJECTIONS: CPT | Performed by: FAMILY MEDICINE

## 2025-04-07 NOTE — PROGRESS NOTES
..  History of Present Illness    Immunotherapy Pre-Injection Questionnaire:         Are you currently pregnant or been diagnosed with a new medical condition? No    1. Have you had increased asthmas symptoms (chest tightness, increased cough, wheezing, or shortness of breath) in the past week? No    2. Have you had increase allergy symptoms (itching eyes or nose, sneezing, runny nose, post-nasal drip, or throat-clearing) in the past week? No    3. Have you had a cold, respiratory tract infection, or flu-like symptoms in the past two weeks? No    4. Did you have any problems such as increased allergy or asthma symptoms, hives, or generalized itching within 12 hours or receiving your last injection? No    5. Are you on any new medications? New eye drops? No    6. Patient confirmed their name and birth date on allergy vials are correct. Yes     Assessment/Plan   Problems Addressed this Visit    None  Diagnoses    None.       Diagnoses and all orders for this visit                Clemente Gaffney 4/7/2025

## 2025-04-07 NOTE — TELEPHONE ENCOUNTER
Caller: Consuelo Carlson  Female, 65 y.o., 1960   MRN: [id number]  CSN: [id number]  Phone: [phone]    Relationship: SELF      What orders are you requesting (i.e. lab or imaging): ORDER FOR BONE DENSITY    In what timeframe would the patient need to come in: AS SOON AS POSSIBLE    Where will you receive your lab/imaging services: PT UNABLE TO PROVIDE NAME OF FACILITY BUT PROVIDED PHONE NUMBER BELOW    Additional notes: PHONE NUMBER- [phone]

## 2025-04-14 ENCOUNTER — CLINICAL SUPPORT (OUTPATIENT)
Dept: FAMILY MEDICINE CLINIC | Facility: CLINIC | Age: 65
End: 2025-04-14
Payer: MEDICARE

## 2025-04-14 DIAGNOSIS — J30.9 ALLERGIC RHINITIS, UNSPECIFIED SEASONALITY, UNSPECIFIED TRIGGER: Primary | ICD-10-CM

## 2025-04-14 PROCEDURE — 95117 IMMUNOTHERAPY INJECTIONS: CPT | Performed by: FAMILY MEDICINE

## 2025-04-14 NOTE — PROGRESS NOTES
..  History of Present Illness    Immunotherapy Pre-Injection Questionnaire:         Are you currently pregnant or been diagnosed with a new medical condition? No    1. Have you had increased asthmas symptoms (chest tightness, increased cough, wheezing, or shortness of breath) in the past week? No    2. Have you had increase allergy symptoms (itching eyes or nose, sneezing, runny nose, post-nasal drip, or throat-clearing) in the past week? No    3. Have you had a cold, respiratory tract infection, or flu-like symptoms in the past two weeks? No    4. Did you have any problems such as increased allergy or asthma symptoms, hives, or generalized itching within 12 hours or receiving your last injection? No    5. Are you on any new medications? New eye drops? No    6. Patient confirmed their name and birth date on allergy vials are correct. Yes     Assessment/Plan   Problems Addressed this Visit    None  Diagnoses    None.       Diagnoses and all orders for this visit                Clemente Gaffney 4/14/2025

## 2025-04-17 ENCOUNTER — OFFICE VISIT (OUTPATIENT)
Dept: NEUROSURGERY | Facility: CLINIC | Age: 65
End: 2025-04-17
Payer: MEDICARE

## 2025-04-17 VITALS
OXYGEN SATURATION: 97 % | SYSTOLIC BLOOD PRESSURE: 119 MMHG | HEIGHT: 64 IN | WEIGHT: 131 LBS | BODY MASS INDEX: 22.36 KG/M2 | DIASTOLIC BLOOD PRESSURE: 80 MMHG | HEART RATE: 79 BPM

## 2025-04-17 DIAGNOSIS — M79.602 BILATERAL ARM PAIN: ICD-10-CM

## 2025-04-17 DIAGNOSIS — Z98.890 HISTORY OF CERVICAL SPINAL SURGERY: ICD-10-CM

## 2025-04-17 DIAGNOSIS — G03.9 ARACHNOIDITIS OF SPINE: ICD-10-CM

## 2025-04-17 DIAGNOSIS — M79.601 BILATERAL ARM PAIN: ICD-10-CM

## 2025-04-17 DIAGNOSIS — M54.42 CHRONIC MIDLINE LOW BACK PAIN WITH BILATERAL SCIATICA: ICD-10-CM

## 2025-04-17 DIAGNOSIS — Z98.890 HISTORY OF LUMBAR SURGERY: ICD-10-CM

## 2025-04-17 DIAGNOSIS — M51.362 DEGENERATION OF INTERVERTEBRAL DISC OF LUMBAR REGION WITH DISCOGENIC BACK PAIN AND LOWER EXTREMITY PAIN: Primary | ICD-10-CM

## 2025-04-17 DIAGNOSIS — M54.41 CHRONIC MIDLINE LOW BACK PAIN WITH BILATERAL SCIATICA: ICD-10-CM

## 2025-04-17 DIAGNOSIS — G89.29 CHRONIC MIDLINE LOW BACK PAIN WITH BILATERAL SCIATICA: ICD-10-CM

## 2025-04-17 DIAGNOSIS — M54.2 CERVICALGIA: ICD-10-CM

## 2025-04-17 NOTE — PROGRESS NOTES
"Chief Complaint  Back Pain, Neck Pain (Pinching pain, left side ), Numbness (Neck /Left leg to knee ), Tingling (Bilateral arms & hands, left ), Hip Pain (Bilateral ), Arm Pain (Bilateral ), and Leg Pain (Bilateral )    Subjective          Consuelo Carlson who is a 65 y.o. year old female who presents to [facility] for Evaluation of the Spine.    Previously: I have personally reviewed the previous neurosurgery visit with Dr. Jim Gomes from 3/26/2019 with the patient complaining of lower back and left leg pain with numbness in the posterior aspect of the left leg, right leg pain in an L4-L5 nerve root pattern.  She had a history of lumbar decompression with Dr. Ny in 2012.  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  It was discussed with her at that time that if her pain was primarily related to lumbar spinal and foraminal stenosis then a lumbar decompression with fusion would be the best treatment, but if her pain was primarily related to arachnoiditis then dorsal column stimulation would be the best treatment.  She was going to take time to consider her options.    Today: The patient complains of pain located in the Cervical Spine.  Patients states the neck pain has been present for 1 month.  The pain came on gradually.  The pain scaled level is 3.  The pain does radiate. Dermatomes are located bilaterally Cervical at: a non-specific dermatome and \"all of them\" for the fingers.  The pain is Intermittent and described as sharp.  The pain is worse at no particular time of day. Patient states Rest makes the pain better.  Patient states  cold, cold wind blowing on it makes the pain worse.    She reports lower back pain since 2011. She rates lower back pain 7/10 today. The " "lower back pain is constant and waxing and waning. The pain is sharp, dull and aching depending. She reports pain radiating into the left leg to the knee, right leg to the foot. She uses lidocaine patches with some benefit.    Associated Symptoms Include: Numbness and Tingling in the arms and legs, weakness in the arms at times and the legs.  Conservative Interventions Include:  Home exercises, heat, ice, is not very effective.  Lidocaine and tylenol is not very effective.     Was this the result of an injury or accident? : No    History of Previous Spinal Surgery?: Yes.  Cervical, Date 2003, unknown level. Lumbar, 2012, unknown level.     reports that she has never smoked. She has never been exposed to tobacco smoke. She has never used smokeless tobacco.    Review of Systems     Objective   Vital Signs:   /80   Pulse 79   Ht 162.6 cm (64\")   Wt 59.4 kg (131 lb)   SpO2 97%   BMI 22.49 kg/m²       Physical Exam  Constitutional:       Appearance: Normal appearance. She is normal weight.   Neck:      Comments: Reduced ROM  Pulmonary:      Effort: Pulmonary effort is normal.   Musculoskeletal:         General: Tenderness (midline and bilateral lumbar and cervical areas) present.      Comments: SLR negative bilaterally,  Angel's negative bilaterally,  Tinel's test positive at the bilateral wrists and left elbow   Neurological:      General: No focal deficit present.      Mental Status: She is alert and oriented to person, place, and time.      Sensory: No sensory deficit.      Motor: No weakness.      Deep Tendon Reflexes: Reflexes normal.   Psychiatric:         Mood and Affect: Mood normal.         Behavior: Behavior normal.        Neurological Exam  Mental Status  Alert. Oriented to person, place, and time.      Result Review     There is no recent spine imaging.    I have personally reviewed the radiology report for CT myelogram of the lumbar spine from 3/26/2019 which shows persistent clumping of the " nerve roots in the cauda equina compatible with chronic arachnoiditis.  There is multilevel degenerative change with mild to moderate central canal narrowing at L4-L5 and moderate to severe bilateral foraminal narrowing at this level.  There is moderate to severe foraminal narrowing on the left at L3-L4 and bilaterally at L5-S1.     Assessment and Plan    Diagnoses and all orders for this visit:    1. Degeneration of intervertebral disc of lumbar region with discogenic back pain and lower extremity pain (Primary)  -     MRI Lumbar Spine With & Without Contrast; Future    2. Chronic midline low back pain with bilateral sciatica  -     MRI Lumbar Spine With & Without Contrast; Future    3. Arachnoiditis of spine  -     MRI Lumbar Spine With & Without Contrast; Future    4. History of lumbar surgery  -     MRI Lumbar Spine With & Without Contrast; Future    5. Cervicalgia    6. Bilateral arm pain    7. History of cervical spinal surgery      She complaints of neck and back pain with bilateral arm and leg pain.    She will discuss the neck and arm complaints with her PCP for further referral as appropriate, but we did discuss possible MRI of the cervical spine to assess the cervical spine pathology. She has had possibly a C5-C6 and C6-C7 ACDF previously. She has been doing home exercises for the neck for the past 1 month since her pain began, without significant improvement.    For the lower back, she has history of lumbar decompression in 2012. There is not recent imaging, but previous CT myelogram radiology report states arachnoiditis and mild to moderate canal stenosis at L4-L5 with moderate to severe foraminal stenosis at L4-L5. It also mentions moderate to severe left foraminal stenosis at L3-L4 and bilaterally at L5-S1.    I would consider repeat imaging to assess for new or worsening lumbar spine pathology. She is agreeable and I will order an MRI of the lumbar spine with and without contrast to evaluate further.  She has done home exercises since 2011 which has not significantly improved her back pain.    She may consider PT or pain management consult. I expect she is a candidate for LESI or possibly a spinal cord stimulator. She is deferring today. She will continue home exercise.    She will follow-up in 4 weeks to reassess after imaging, sooner if needed.    Follow Up   Return in about 4 weeks (around 5/15/2025).  Patient was given instructions and counseling regarding her condition or for health maintenance advice. Please see specific information pulled into the AVS if appropriate.

## 2025-04-18 ENCOUNTER — TELEPHONE (OUTPATIENT)
Dept: FAMILY MEDICINE CLINIC | Facility: CLINIC | Age: 65
End: 2025-04-18
Payer: MEDICARE

## 2025-04-18 DIAGNOSIS — M54.12 CERVICAL RADICULOPATHY: Primary | ICD-10-CM

## 2025-04-18 NOTE — TELEPHONE ENCOUNTER
Patient seen Dr. Ricardo for her back but patient always want Dr. Ricardo to treat her for her neck, they told patient that you need to see a referral for her neck so they can treat it to. Thanks

## 2025-04-21 ENCOUNTER — CLINICAL SUPPORT (OUTPATIENT)
Dept: FAMILY MEDICINE CLINIC | Facility: CLINIC | Age: 65
End: 2025-04-21
Payer: MEDICARE

## 2025-04-21 DIAGNOSIS — J30.9 ALLERGIC RHINITIS, UNSPECIFIED SEASONALITY, UNSPECIFIED TRIGGER: Primary | ICD-10-CM

## 2025-04-21 PROCEDURE — 95117 IMMUNOTHERAPY INJECTIONS: CPT | Performed by: FAMILY MEDICINE

## 2025-04-21 NOTE — PROGRESS NOTES
History of Present Illness    Immunotherapy Pre-Injection Questionnaire:         Are you currently pregnant or been diagnosed with a new medical condition? No    1. Have you had increased asthmas symptoms (chest tightness, increased cough, wheezing, or shortness of breath) in the past week? No    2. Have you had increase allergy symptoms (itching eyes or nose, sneezing, runny nose, post-nasal drip, or throat-clearing) in the past week? No    3. Have you had a cold, respiratory tract infection, or flu-like symptoms in the past two weeks? No    4. Did you have any problems such as increased allergy or asthma symptoms, hives, or generalized itching within 12 hours or receiving your last injection? No    5. Are you on any new medications? New eye drops? No    6. Patient confirmed their name and birth date on allergy vials are correct. Yes     Assessment/Plan   Diagnoses and all orders for this visit:    1. Allergic rhinitis, unspecified seasonality, unspecified trigger (Primary)  -     patient supplied allergy injection  -     patient supplied allergy injection      Diagnoses and all orders for this visit                Karely Hickey MA 4/21/2025

## 2025-04-25 DIAGNOSIS — I10 PRIMARY HYPERTENSION: Chronic | ICD-10-CM

## 2025-04-25 RX ORDER — METOPROLOL SUCCINATE 50 MG/1
TABLET, EXTENDED RELEASE ORAL
Qty: 90 TABLET | Refills: 0 | Status: SHIPPED | OUTPATIENT
Start: 2025-04-25

## 2025-04-26 DIAGNOSIS — I10 PRIMARY HYPERTENSION: Chronic | ICD-10-CM

## 2025-04-28 ENCOUNTER — TELEPHONE (OUTPATIENT)
Dept: FAMILY MEDICINE CLINIC | Facility: CLINIC | Age: 65
End: 2025-04-28

## 2025-04-28 ENCOUNTER — CLINICAL SUPPORT (OUTPATIENT)
Dept: FAMILY MEDICINE CLINIC | Facility: CLINIC | Age: 65
End: 2025-04-28
Payer: MEDICARE

## 2025-04-28 DIAGNOSIS — F51.01 PRIMARY INSOMNIA: Chronic | ICD-10-CM

## 2025-04-28 DIAGNOSIS — M54.12 CERVICAL RADICULOPATHY: Primary | ICD-10-CM

## 2025-04-28 DIAGNOSIS — F32.5 MAJOR DEPRESSIVE DISORDER IN FULL REMISSION, UNSPECIFIED WHETHER RECURRENT: Chronic | ICD-10-CM

## 2025-04-28 DIAGNOSIS — J30.9 ALLERGIC RHINITIS, UNSPECIFIED SEASONALITY, UNSPECIFIED TRIGGER: Primary | ICD-10-CM

## 2025-04-28 PROCEDURE — 95117 IMMUNOTHERAPY INJECTIONS: CPT | Performed by: FAMILY MEDICINE

## 2025-04-28 RX ORDER — ATORVASTATIN CALCIUM 40 MG/1
40 TABLET, FILM COATED ORAL NIGHTLY
Qty: 90 TABLET | Refills: 0 | Status: SHIPPED | OUTPATIENT
Start: 2025-04-28

## 2025-04-28 RX ORDER — METOPROLOL SUCCINATE 50 MG/1
TABLET, EXTENDED RELEASE ORAL
Qty: 90 TABLET | Refills: 0 | OUTPATIENT
Start: 2025-04-28

## 2025-04-28 NOTE — TELEPHONE ENCOUNTER
Ok I put in urgent MRI C spine to try and be scheduled same day time as Lumbar MRI     Sorry for any delay

## 2025-04-28 NOTE — TELEPHONE ENCOUNTER
OSCAR LET US KNOW THAT SHE NEEDS AN MRI CERV SPINE ORDERED TO GO ALONG WITH THE OTHER MRI FROM DR OLEA THAT WAS ORDERED.  IF YOU CHECK THE NOTES FROM HER NEURO REFERRAL THERE IS REFERENCE TO THIS REQUEST FROM DR FRANKLIN AS WELL.  SHE IS HERE TODAY GETTING HER ALLERGY SHOTS IF YOU NEED TO SPEAK WITH HER ABOUT IT.

## 2025-04-28 NOTE — PROGRESS NOTES
History of Present Illness    Immunotherapy Pre-Injection Questionnaire:         Are you currently pregnant or been diagnosed with a new medical condition? No    1. Have you had increased asthmas symptoms (chest tightness, increased cough, wheezing, or shortness of breath) in the past week? No    2. Have you had increase allergy symptoms (itching eyes or nose, sneezing, runny nose, post-nasal drip, or throat-clearing) in the past week? No    3. Have you had a cold, respiratory tract infection, or flu-like symptoms in the past two weeks? No    4. Did you have any problems such as increased allergy or asthma symptoms, hives, or generalized itching within 12 hours or receiving your last injection? No    5. Are you on any new medications? New eye drops? No    6. Patient confirmed their name and birth date on allergy vials are correct. Yes     Assessment/Plan   Problems Addressed this Visit    None  Diagnoses    None.       Diagnoses and all orders for this visit                  Bernie Bryan LPN 4/28/2025

## 2025-04-29 ENCOUNTER — OFFICE VISIT (OUTPATIENT)
Dept: DIABETES SERVICES | Facility: HOSPITAL | Age: 65
End: 2025-04-29
Payer: MEDICARE

## 2025-04-29 VITALS
HEIGHT: 64 IN | WEIGHT: 129 LBS | OXYGEN SATURATION: 97 % | HEART RATE: 74 BPM | BODY MASS INDEX: 22.02 KG/M2 | SYSTOLIC BLOOD PRESSURE: 127 MMHG | DIASTOLIC BLOOD PRESSURE: 67 MMHG

## 2025-04-29 DIAGNOSIS — E11.22 TYPE 2 DIABETES MELLITUS WITH STAGE 2 CHRONIC KIDNEY DISEASE, WITHOUT LONG-TERM CURRENT USE OF INSULIN: ICD-10-CM

## 2025-04-29 DIAGNOSIS — E11.65 UNCONTROLLED TYPE 2 DIABETES MELLITUS WITH HYPERGLYCEMIA: Primary | ICD-10-CM

## 2025-04-29 DIAGNOSIS — N18.2 TYPE 2 DIABETES MELLITUS WITH STAGE 2 CHRONIC KIDNEY DISEASE, WITHOUT LONG-TERM CURRENT USE OF INSULIN: ICD-10-CM

## 2025-04-29 LAB
EXPIRATION DATE: ABNORMAL
GLUCOSE BLDC GLUCOMTR-MCNC: 198 MG/DL (ref 70–99)
HBA1C MFR BLD: 7.2 % (ref 4.5–5.7)
Lab: ABNORMAL

## 2025-04-29 PROCEDURE — 1160F RVW MEDS BY RX/DR IN RCRD: CPT | Performed by: NURSE PRACTITIONER

## 2025-04-29 PROCEDURE — 82948 REAGENT STRIP/BLOOD GLUCOSE: CPT | Performed by: NURSE PRACTITIONER

## 2025-04-29 PROCEDURE — 99214 OFFICE O/P EST MOD 30 MIN: CPT | Performed by: NURSE PRACTITIONER

## 2025-04-29 PROCEDURE — G0463 HOSPITAL OUTPT CLINIC VISIT: HCPCS | Performed by: NURSE PRACTITIONER

## 2025-04-29 PROCEDURE — G2211 COMPLEX E/M VISIT ADD ON: HCPCS | Performed by: NURSE PRACTITIONER

## 2025-04-29 PROCEDURE — 3051F HG A1C>EQUAL 7.0%<8.0%: CPT | Performed by: NURSE PRACTITIONER

## 2025-04-29 PROCEDURE — 83036 HEMOGLOBIN GLYCOSYLATED A1C: CPT | Performed by: NURSE PRACTITIONER

## 2025-04-29 PROCEDURE — 1159F MED LIST DOCD IN RCRD: CPT | Performed by: NURSE PRACTITIONER

## 2025-04-29 PROCEDURE — 3078F DIAST BP <80 MM HG: CPT | Performed by: NURSE PRACTITIONER

## 2025-04-29 PROCEDURE — 3074F SYST BP LT 130 MM HG: CPT | Performed by: NURSE PRACTITIONER

## 2025-04-29 RX ORDER — VENLAFAXINE HYDROCHLORIDE 150 MG/1
150 CAPSULE, EXTENDED RELEASE ORAL DAILY
Qty: 90 CAPSULE | Refills: 0 | Status: SHIPPED | OUTPATIENT
Start: 2025-04-29

## 2025-04-29 RX ORDER — METFORMIN HYDROCHLORIDE 500 MG/1
1000 TABLET, EXTENDED RELEASE ORAL 2 TIMES DAILY
Qty: 360 TABLET | Refills: 1 | Status: SHIPPED | OUTPATIENT
Start: 2025-04-29 | End: 2025-10-26

## 2025-04-29 NOTE — PROGRESS NOTES
Chief Complaint  Diabetes (Med management, A1C)    Referred By: Paul Choi DO    Subjective          Patient or patient representative verbalized consent for the use of Ambient Listening during the visit with  ROBI Ma for chart documentation. 4/29/2025  10:24 EDT    Consuelo Carlson presents to [facility] for diabetes medication management    History of Present Illness    Visit type:  follow-up  Diabetes type:  Type 2  Current diabetes status/concerns/issues:     History of Present Illness  The patient presents for evaluation of diabetes.    The chief complaint is related to diabetes management. She is currently on a regimen of metformin extended release 1000 mg, administered twice daily, for the management of her diabetes. No symptoms of excessive thirst, urination, or hunger are reported. Additionally, there are no episodes of blurred vision or unusual fatigue beyond normal life occurrences. Blood glucose levels are monitored at home, although not on a daily basis. Over the past month, morning blood glucose levels have ranged from 140 to 240. She is able to discern when blood glucose levels are either too high or too low, with a level of 200 not being considered high for her, but a level of 100 being perceived as too low. Diet includes diet drinks and tea, which she sweeteners with a half a cup of sugar per gallon. Regular physical activity is limited due to back pain, but efforts are made to remain active throughout the day. No history of yeast infections or urinary tract infections is reported.       Current Diabetes symptoms:    Polyuria: No   Polydipsia: No   Polyphagia: No   Blurred vision: No   Excessive fatigue: No  Known Diabetes complications:  Neuropathy: None; Location: N/A  Renal: Stage II mild (GFR = 60-89 mL/min) and Microalbuminuria - NEGATIVE  Eyes: No current eye exam available in record; Location: N/A  Amputation/Wounds: None  GI:  None  Cardiovascular: Hypertension and Other: Tachycardia  ED: N/A  Other: None  Hypoglycemia:  None reported at this time  Hypoglycemia Symptoms:  No hypoglycemia at this time  Current diabetes treatment:  Metformin ER 1000 mg twice a day   Blood glucose device:  Meter  Blood glucose monitoring frequency:  Random/occasional  Blood glucose range/average:   140-240  Glucose Source: BG Logs  Diet:  Limits high carb/sweet foods, she does drink some sweet  Activity/Exercise:   limited due to back issues but tries to stay active    Past Medical History:   Diagnosis Date    Anxiety and depression     Arthritis     Arthritis of neck     DDD (degenerative disc disease), cervical     Diabetes mellitus     Encounter for subsequent annual wellness visit (AWV) in Medicare patient 2021    Fracture of ankle     Hypertension     Macular degeneration     RIGHT EYE    Migraine 1963    Multiple gestation 1979    Placenta previa     Rh incompatibility Neg rh factor    Seasonal allergies     Spinal stenosis     Tachycardia     Type 2 diabetes mellitus     Varicella child     Past Surgical History:   Procedure Laterality Date    ANTERIOR CERVICAL DISCECTOMY W/ FUSION      AND INSTRUMENTATION    BACK SURGERY      BILATERAL BREAST REDUCTION Bilateral 2017    Procedure: BREAST REDUCTION BILATERAL;  Surgeon: Jorge Lebron MD;  Location: [facility];  Service:      SECTION      COLONOSCOPY      HYSTERECTOMY      KNEE SURGERY Right     NECK SURGERY      TONSILLECTOMY      TUBAL ABDOMINAL LIGATION       Family History   Problem Relation Age of Onset    Diabetes Mother     Diabetes Sister     Diabetes Paternal Grandmother     Breast cancer Maternal Aunt     Breast cancer Maternal Aunt      Social History     Socioeconomic History    Marital status:     Number of children: 2   Tobacco Use    Smoking status: Never     Passive exposure: Never    Smokeless tobacco: Never   Vaping Use    Vaping status: Never  "Used   Substance and Sexual Activity    Alcohol use: No    Drug use: No    Sexual activity: Yes     Partners: Male     Birth control/protection: Hysterectomy     No Known Allergies    Current Outpatient Medications:     aspirin 81 MG EC tablet, Take 1 tablet by mouth., Disp: , Rfl:     atorvastatin (LIPITOR) 40 MG tablet, TAKE 1 TABLET BY MOUTH AT BEDTIME FOR CHOLESTEROL, Disp: 90 tablet, Rfl: 0    clobetasol (TEMOVATE) 0.05 % cream, APPLY TO AFFECTED AREA(S) EVERY OTHER DAY AS DIRECTED, Disp: 30 g, Rfl: 1    lidocaine (LIDODERM) 5 %, APPLY ONE PATCH EVERY 12 HOURS AS NEEDED FOR PAIN, Disp: 30 patch, Rfl: 0    metFORMIN ER (GLUCOPHAGE-XR) 500 MG 24 hr tablet, Take 2 tablets by mouth 2 (Two) Times a Day for 180 days., Disp: 360 tablet, Rfl: 1    metoprolol succinate XL (TOPROL-XL) 50 MG 24 hr tablet, TAKE 1 TABLET BY MOUTH ONCE DAILY FOR BLOOD PRESSURE OR HEART, Disp: 90 tablet, Rfl: 0    Multiple Vitamins-Minerals (EYE VITAMINS PO), Take 1 tablet by mouth Daily. HOLD PRIOR TO SURGERY, Disp: , Rfl:     temazepam (RESTORIL) 30 MG capsule, TAKE 1 CAPSULE BY MOUTH AT BEDTIME AS NEEDED FOR ANXIETY OR SLEEP, Disp: 30 capsule, Rfl: 5    venlafaxine XR (EFFEXOR-XR) 150 MG 24 hr capsule, TAKE 1 CAPSULE BY MOUTH ONCE DAILY, Disp: 90 capsule, Rfl: 0    empagliflozin (Jardiance) 10 MG tablet tablet, Take 1 tablet by mouth Daily for 180 days., Disp: 90 tablet, Rfl: 1    Objective     Vitals:    04/29/25 0952   BP: 127/67   BP Location: Left arm   Patient Position: Sitting   Cuff Size: Adult   Pulse: 74   SpO2: 97%   Weight: 58.5 kg (129 lb)   Height: 162.6 cm (64\")     Body mass index is 22.14 kg/m².    Physical Exam  Constitutional:       Appearance: Normal appearance. She is normal weight.   HENT:      Head: Normocephalic and atraumatic.      Right Ear: External ear normal.      Left Ear: External ear normal.      Nose: Nose normal.   Eyes:      Extraocular Movements: Extraocular movements intact.      Conjunctiva/sclera: " Conjunctivae normal.   Pulmonary:      Effort: Pulmonary effort is normal.   Musculoskeletal:         General: Normal range of motion.      Cervical back: Normal range of motion.   Skin:     General: Skin is warm and dry.   Neurological:      General: No focal deficit present.      Mental Status: She is alert and oriented to person, place, and time. Mental status is at baseline.   Psychiatric:         Mood and Affect: Mood normal.         Behavior: Behavior normal.         Thought Content: Thought content normal.         Judgment: Judgment normal.             Result Review :   The following data was reviewed by: ROBI Ma on 04/29/2025:    Most Recent A1C          4/29/2025    10:05   HGBA1C Most Recent   Hemoglobin A1C 7.2        A1C Last 3 Results          11/11/2024    11:19 3/3/2025    10:25 3/3/2025    10:37 4/29/2025    10:05   HGBA1C Last 3 Results   Hemoglobin A1C 7.9  6.8  6.90  7.2      A1c collected in the office today is 7.2%, indicating Uncontrolled Type II diabetes.  This result is up from the prior result of 6.8% collected on 3/3/25     Glucose   Date Value Ref Range Status   04/29/2025 198 (H) 70 - 99 mg/dL Final     Comment:     Serial Number: [id number]:  [id number]     Point of care glucose in the office today is  elevated at 198 mg/dL    Creatinine   Date Value Ref Range Status   03/03/2025 0.81 0.57 - 1.00 mg/dL Final   05/21/2024 0.67 0.57 - 1.00 mg/dL Final     eGFR   Date Value Ref Range Status   03/03/2025 80.2 >60.0 mL/min/1.73 Final   05/21/2024 96.5 >60.0 mL/min/1.73 Final     Labs collected on 3/3/25 show Stage II mild (GFR = 60-89mL/min)    Microalbumin, Urine   Date Value Ref Range Status   03/03/2025 1.9 mg/dL Final   11/11/2024 2.2 mg/dL Final     Creatinine, Urine   Date Value Ref Range Status   03/03/2025 148.1 mg/dL Final   11/11/2024 119.8 mg/dL Final     Microalbumin/Creatinine Ratio   Date Value Ref Range Status   03/03/2025 12.8 0.0 - 29.0 mg/g Final    11/11/2024 18.4 0.0 - 29.0 mg/g Final     Urine microalbuminuria collected on 3/38/25 is negative for microalbuminuria    Total Cholesterol   Date Value Ref Range Status   03/03/2025 135 0 - 200 mg/dL Final   05/21/2024 124 0 - 200 mg/dL Final     Triglycerides   Date Value Ref Range Status   03/03/2025 286 (H) 0 - 150 mg/dL Final   05/21/2024 121 0 - 150 mg/dL Final     HDL Cholesterol   Date Value Ref Range Status   03/03/2025 36 (L) 40 - 60 mg/dL Final   05/21/2024 46 40 - 60 mg/dL Final     LDL Cholesterol    Date Value Ref Range Status   03/03/2025 54 0 - 100 mg/dL Final   05/21/2024 56 0 - 100 mg/dL Final     Lipid panel collected on 3/3/25 shows Hypertriglyceridemia and low HDL              Diagnoses and all orders for this visit:    1. Uncontrolled type 2 diabetes mellitus with hyperglycemia (Primary)  -     POC Glycosylated Hemoglobin (Hb A1C)  -     POC Glucose  -     empagliflozin (Jardiance) 10 MG tablet tablet; Take 1 tablet by mouth Daily for 180 days.  Dispense: 90 tablet; Refill: 1  -     metFORMIN ER (GLUCOPHAGE-XR) 500 MG 24 hr tablet; Take 2 tablets by mouth 2 (Two) Times a Day for 180 days.  Dispense: 360 tablet; Refill: 1    2. Type 2 diabetes mellitus with stage 2 chronic kidney disease, without long-term current use of insulin        Assessment & Plan  1. Diabetes Mellitus.  - Her A1c level today is 7.2, which is an increase from the previous readings of 6.8 and 6.9 recorded in 03/2025. The blood glucose level in the clinic today was 198, which, while not excessively high, is still above the desired range.  - A review of her random blood glucose log revealed levels of 244, 199, and 231, all of which are higher than the target range. She has experienced a weight loss of approximately 3 pounds since her last visit.  - She was advised to monitor her blood glucose levels two hours post-consumption of sweet tea. If the reading exceeds 180, she should reduce her sugar intake.   - A prescription  for Jardiance was provided, with instructions to commence the medication upon her return from her mission trip. She was informed about the potential side effects of Jardiance, including dehydration and yeast infections, and was advised to maintain adequate hydration. She was advised to report any signs of yeast infection or UTI, which will be treated accordingly. If these conditions recur, an alternative medication will be considered. If Jardiance proves to be too costly, she was advised to inform us so that an alternative treatment plan can be devised.          The patient will monitor her blood glucose levels 1 time daily.  If she develops problematic hyperglycemia or hypoglycemia or adverse drug reactions, she will contact the office for further instructions.        Follow Up     Return in about 3 months (around 7/29/2025) for Medication Management.    Patient was given instructions and counseling regarding her condition or for health maintenance advice. Please see specific information pulled into the AVS if appropriate.     Harini Sood, APRN  04/29/2025        Dictated Utilizing Dragon Dictation.  Please note that portions of this note were completed with a voice recognition program.  Part of this note may be an electronic transcription/translation of spoken language to printed text using the Dragon Dictation System.

## 2025-05-01 ENCOUNTER — TELEPHONE (OUTPATIENT)
Dept: DIABETES SERVICES | Facility: HOSPITAL | Age: 65
End: 2025-05-01
Payer: MEDICARE

## 2025-05-01 NOTE — TELEPHONE ENCOUNTER
Patient called and stated that when she went to  her Jardiance it was too expensive and wanted to let Harini know so something else can be called in.

## 2025-05-12 ENCOUNTER — CLINICAL SUPPORT (OUTPATIENT)
Dept: FAMILY MEDICINE CLINIC | Facility: CLINIC | Age: 65
End: 2025-05-12
Payer: MEDICARE

## 2025-05-12 DIAGNOSIS — J30.9 ALLERGIC RHINITIS, UNSPECIFIED SEASONALITY, UNSPECIFIED TRIGGER: Primary | ICD-10-CM

## 2025-05-12 PROCEDURE — 95117 IMMUNOTHERAPY INJECTIONS: CPT | Performed by: FAMILY MEDICINE

## 2025-05-12 NOTE — PROGRESS NOTES
History of Present Illness    Immunotherapy Pre-Injection Questionnaire:         Are you currently pregnant or been diagnosed with a new medical condition? No    1. Have you had increased asthmas symptoms (chest tightness, increased cough, wheezing, or shortness of breath) in the past week? No    2. Have you had increase allergy symptoms (itching eyes or nose, sneezing, runny nose, post-nasal drip, or throat-clearing) in the past week? No    3. Have you had a cold, respiratory tract infection, or flu-like symptoms in the past two weeks? No    4. Did you have any problems such as increased allergy or asthma symptoms, hives, or generalized itching within 12 hours or receiving your last injection? No    5. Are you on any new medications? New eye drops? No    6. Patient confirmed their name and birth date on allergy vials are correct. Yes     Assessment/Plan     Diagnoses and all orders for this visit        Rashmi Griffith RN 5/12/2025

## 2025-05-13 ENCOUNTER — PROCEDURE VISIT (OUTPATIENT)
Dept: OBSTETRICS AND GYNECOLOGY | Facility: CLINIC | Age: 65
End: 2025-05-13
Payer: MEDICARE

## 2025-05-13 ENCOUNTER — OFFICE VISIT (OUTPATIENT)
Dept: OBSTETRICS AND GYNECOLOGY | Facility: CLINIC | Age: 65
End: 2025-05-13
Payer: MEDICARE

## 2025-05-13 VITALS
HEIGHT: 64 IN | SYSTOLIC BLOOD PRESSURE: 160 MMHG | WEIGHT: 130.6 LBS | DIASTOLIC BLOOD PRESSURE: 83 MMHG | BODY MASS INDEX: 22.3 KG/M2

## 2025-05-13 DIAGNOSIS — Z12.31 VISIT FOR SCREENING MAMMOGRAM: Primary | ICD-10-CM

## 2025-05-13 DIAGNOSIS — Z01.419 ENCOUNTER FOR ROUTINE GYNECOLOGIC EXAMINATION IN MEDICARE PATIENT: Primary | ICD-10-CM

## 2025-05-13 RX ORDER — CLOBETASOL PROPIONATE 0.5 MG/G
CREAM TOPICAL 2 TIMES DAILY
Qty: 30 G | Refills: 3 | Status: SHIPPED | OUTPATIENT
Start: 2025-05-13

## 2025-05-13 RX ORDER — ESTRADIOL 0.1 MG/G
2 CREAM VAGINAL 2 TIMES WEEKLY
Qty: 42.5 G | Refills: 4 | Status: SHIPPED | OUTPATIENT
Start: 2025-05-15

## 2025-05-13 NOTE — PROGRESS NOTES
GYN Annual Exam     CC- Here for annual exam. ([facility] patient here for her annual today. Pap 2023 neg, mammo today, dexa 2023, colon pre [facility]a due in .)     Consuelo Carlson is a 65 y.o. female who presents for annual well woman exam. She is menopausal.  She is experiencing and/or reports vaginal dryness and dyspareunia.  She denies postmenopausal vaginal bleeding.  She denies abdominal pain.  Today, she wishes to specifically address routine exam and vaginal dryness related dyspareunia.  I explained to her that current ACOG guidelines state that screening Pap smears are no longer recommended after the age of 65, nor after hysterectomy for benign reasons.    OB History          2    Para   2    Term   2            AB        Living             SAB        IAB        Ectopic        Molar        Multiple        Live Births                    Current contraception: status post hysterectomy  History of abnormal Pap smear: no  Family history of uterine, colon or ovarian cancer: no  History of abnormal mammogram: no  Family history of breast cancer: yes - maternal aunt x 2  Last Pap : Several years ago  Last mammogram: Done today  Last colonoscopy: Due in   Last DEXA: 2023 mild osteopenia of the hip      Past Medical History:   Diagnosis Date    Anxiety and depression     Arthritis     Arthritis of neck     DDD (degenerative disc disease), cervical     Diabetes mellitus     Encounter for subsequent annual wellness visit (AWV) in Medicare patient 2021    Fracture of ankle     Hypertension     Macular degeneration     RIGHT EYE    Migraine 1963    Multiple gestation 1979    Placenta previa 1979    Rh incompatibility Neg rh factor    Seasonal allergies     Spinal stenosis     Tachycardia     Type 2 diabetes mellitus     Varicella child       Past Surgical History:   Procedure Laterality Date    ANTERIOR CERVICAL DISCECTOMY W/ FUSION      AND INSTRUMENTATION    BACK SURGERY       BILATERAL BREAST REDUCTION Bilateral 2017    Procedure: BREAST REDUCTION BILATERAL;  Surgeon: Jorge Lebron MD;  Location: [facility];  Service:      SECTION      COLONOSCOPY      HYSTERECTOMY      KNEE SURGERY Right     NECK SURGERY      TONSILLECTOMY      TUBAL ABDOMINAL LIGATION           Current Outpatient Medications:     aspirin 81 MG EC tablet, Take 1 tablet by mouth., Disp: , Rfl:     atorvastatin (LIPITOR) 40 MG tablet, TAKE 1 TABLET BY MOUTH AT BEDTIME FOR CHOLESTEROL, Disp: 90 tablet, Rfl: 0    clobetasol (TEMOVATE) 0.05 % cream, APPLY TO AFFECTED AREA(S) EVERY OTHER DAY AS DIRECTED, Disp: 30 g, Rfl: 1    empagliflozin (Jardiance) 10 MG tablet tablet, Take 1 tablet by mouth Daily for 180 days., Disp: 90 tablet, Rfl: 1    lidocaine (LIDODERM) 5 %, APPLY ONE PATCH EVERY 12 HOURS AS NEEDED FOR PAIN, Disp: 30 patch, Rfl: 0    metFORMIN ER (GLUCOPHAGE-XR) 500 MG 24 hr tablet, Take 2 tablets by mouth 2 (Two) Times a Day for 180 days., Disp: 360 tablet, Rfl: 1    metoprolol succinate XL (TOPROL-XL) 50 MG 24 hr tablet, TAKE 1 TABLET BY MOUTH ONCE DAILY FOR BLOOD PRESSURE OR HEART, Disp: 90 tablet, Rfl: 0    Multiple Vitamins-Minerals (EYE VITAMINS PO), Take 1 tablet by mouth Daily. HOLD PRIOR TO SURGERY, Disp: , Rfl:     temazepam (RESTORIL) 30 MG capsule, TAKE 1 CAPSULE BY MOUTH AT BEDTIME AS NEEDED FOR ANXIETY OR SLEEP, Disp: 30 capsule, Rfl: 5    venlafaxine XR (EFFEXOR-XR) 150 MG 24 hr capsule, TAKE 1 CAPSULE BY MOUTH ONCE DAILY, Disp: 90 capsule, Rfl: 0    No Known Allergies    Social History     Tobacco Use    Smoking status: Never     Passive exposure: Never    Smokeless tobacco: Never   Vaping Use    Vaping status: Never Used   Substance Use Topics    Alcohol use: No    Drug use: No       Family History   Problem Relation Age of Onset    Diabetes Mother     Diabetes Sister     Diabetes Paternal Grandmother     Breast cancer Maternal Aunt     Breast cancer Maternal Aunt        Review of  "Systems   Constitutional:  Negative for fatigue and fever.   Genitourinary:  Positive for dyspareunia. Negative for pelvic pain, urinary incontinence and vaginal bleeding.       /83   Ht 162.6 cm (64\")   Wt 59.2 kg (130 lb 9.6 oz)   LMP  (LMP Unknown)   BMI 22.42 kg/m²     Physical Exam  Constitutional:       Appearance: She is normal weight.   Genitourinary:      Bladder and urethral meatus normal.      No lesions in the vagina.      Right Labia: No tenderness or lesions.     Left Labia: No lesions.     Vaginal cuff intact.     No vaginal discharge, tenderness or bleeding.      Moderate vaginal atrophy present.       Right Adnexa: not tender, not full and no mass present.     Left Adnexa: not tender, not full and no mass present.     Cervix is absent.      Uterus is absent.   Breasts:     Right: No mass, nipple discharge, skin change or tenderness.      Left: No mass, nipple discharge, skin change or tenderness.   Neck:      Thyroid: No thyroid mass or thyromegaly.   Abdominal:      General: Abdomen is flat.      Palpations: Abdomen is soft. There is no mass.      Tenderness: There is no abdominal tenderness.   Neurological:      Mental Status: She is alert.   Vitals reviewed.             Assessment     1) GYN annual well woman exam.   2) dyspareunia related to vaginal atrophy.  I discussed with patient options and she has already tried different moisturizers.  Will go with vaginal Estrace cream twice weekly.     Plan     1) Breast Health - Clinical breast exam & mammogram reviewed specifically American Cancer Society recommendations for screening specific to her, and Self breast awareness monthly  2) Pap -not indicated after hysterectomy  3) Smoking status-negative  4) Colon health - screening colonoscopy recommended if not up to date  5) Bone health - Weight bearing exercise, dietary calcium recommendations and vitamin D reviewed.   6) Encouraged to be wary of information obtained via social media and " internet based on source and search.   7) Follow up prn and one year      Froylan Villa MD   5/13/2025  13:38 EDT

## 2025-05-16 ENCOUNTER — HOSPITAL ENCOUNTER (OUTPATIENT)
Dept: MRI IMAGING | Facility: HOSPITAL | Age: 65
Discharge: HOME | End: 2025-05-16
Payer: MEDICARE

## 2025-05-16 DIAGNOSIS — G89.29 CHRONIC MIDLINE LOW BACK PAIN WITH BILATERAL SCIATICA: ICD-10-CM

## 2025-05-16 DIAGNOSIS — M54.12 CERVICAL RADICULOPATHY: ICD-10-CM

## 2025-05-16 DIAGNOSIS — Z98.890 HISTORY OF LUMBAR SURGERY: ICD-10-CM

## 2025-05-16 DIAGNOSIS — G03.9 ARACHNOIDITIS OF SPINE: ICD-10-CM

## 2025-05-16 DIAGNOSIS — M54.41 CHRONIC MIDLINE LOW BACK PAIN WITH BILATERAL SCIATICA: ICD-10-CM

## 2025-05-16 DIAGNOSIS — M51.362 DEGENERATION OF INTERVERTEBRAL DISC OF LUMBAR REGION WITH DISCOGENIC BACK PAIN AND LOWER EXTREMITY PAIN: ICD-10-CM

## 2025-05-16 DIAGNOSIS — M54.42 CHRONIC MIDLINE LOW BACK PAIN WITH BILATERAL SCIATICA: ICD-10-CM

## 2025-05-16 LAB
CREAT BLDA-MCNC: 0.7 MG/DL (ref 0.6–1.3)
EGFRCR SERPLBLD CKD-EPI 2021: 94.9 ML/MIN/1.73

## 2025-05-16 PROCEDURE — A9577 INJ MULTIHANCE: HCPCS | Performed by: FAMILY MEDICINE

## 2025-05-16 PROCEDURE — 82565 ASSAY OF CREATININE: CPT

## 2025-05-16 PROCEDURE — 25510000002 GADOBENATE DIMEGLUMINE 529 MG/ML SOLUTION: Performed by: FAMILY MEDICINE

## 2025-05-16 PROCEDURE — 72156 MRI NECK SPINE W/O & W/DYE: CPT

## 2025-05-16 PROCEDURE — 72158 MRI LUMBAR SPINE W/O & W/DYE: CPT

## 2025-05-16 RX ADMIN — GADOBENATE DIMEGLUMINE 15 ML: 529 INJECTION, SOLUTION INTRAVENOUS at 12:11

## 2025-05-19 ENCOUNTER — CLINICAL SUPPORT (OUTPATIENT)
Dept: FAMILY MEDICINE CLINIC | Facility: CLINIC | Age: 65
End: 2025-05-19
Payer: MEDICARE

## 2025-05-19 ENCOUNTER — TELEPHONE (OUTPATIENT)
Dept: DIABETES SERVICES | Facility: HOSPITAL | Age: 65
End: 2025-05-19
Payer: MEDICARE

## 2025-05-19 DIAGNOSIS — J30.9 ALLERGIC RHINITIS, UNSPECIFIED SEASONALITY, UNSPECIFIED TRIGGER: Primary | ICD-10-CM

## 2025-05-19 PROCEDURE — 95117 IMMUNOTHERAPY INJECTIONS: CPT | Performed by: FAMILY MEDICINE

## 2025-05-19 NOTE — PROGRESS NOTES
History of Present Illness    Immunotherapy Pre-Injection Questionnaire:         Are you currently pregnant or been diagnosed with a new medical condition? No    1. Have you had increased asthmas symptoms (chest tightness, increased cough, wheezing, or shortness of breath) in the past week? No    2. Have you had increase allergy symptoms (itching eyes or nose, sneezing, runny nose, post-nasal drip, or throat-clearing) in the past week? No    3. Have you had a cold, respiratory tract infection, or flu-like symptoms in the past two weeks? No    4. Did you have any problems such as increased allergy or asthma symptoms, hives, or generalized itching within 12 hours or receiving your last injection? No    5. Are you on any new medications? New eye drops? No    6. Patient confirmed their name and birth date on allergy vials are correct. Yes     Assessment/Plan     Diagnoses and all orders for this visit      Rashmi Griffith RN 5/19/2025

## 2025-05-19 NOTE — TELEPHONE ENCOUNTER
Called to notify patient [employer] will not accept hand written income statements. Advised patient if she does not have SSI statements she can have them printed at the local SS office. Also advised patient she will need to still sign an application for medication through [email or web address]    Patient verbalized understanding. No further questions at this time

## 2025-05-20 ENCOUNTER — OFFICE VISIT (OUTPATIENT)
Dept: NEUROSURGERY | Facility: CLINIC | Age: 65
End: 2025-05-20
Payer: MEDICARE

## 2025-05-20 VITALS
SYSTOLIC BLOOD PRESSURE: 112 MMHG | HEIGHT: 64 IN | HEART RATE: 79 BPM | WEIGHT: 132 LBS | BODY MASS INDEX: 22.53 KG/M2 | DIASTOLIC BLOOD PRESSURE: 80 MMHG | OXYGEN SATURATION: 98 %

## 2025-05-20 DIAGNOSIS — M47.812 CERVICAL SPONDYLOSIS WITHOUT MYELOPATHY: ICD-10-CM

## 2025-05-20 DIAGNOSIS — M48.061 SPINAL STENOSIS OF LUMBAR REGION WITH RADICULOPATHY: ICD-10-CM

## 2025-05-20 DIAGNOSIS — M54.16 SPINAL STENOSIS OF LUMBAR REGION WITH RADICULOPATHY: ICD-10-CM

## 2025-05-20 DIAGNOSIS — Z98.890 PERSONAL HISTORY OF SPINE SURGERY: ICD-10-CM

## 2025-05-20 DIAGNOSIS — M51.362 DEGENERATION OF INTERVERTEBRAL DISC OF LUMBAR REGION WITH DISCOGENIC BACK PAIN AND LOWER EXTREMITY PAIN: Primary | ICD-10-CM

## 2025-05-20 NOTE — H&P (VIEW-ONLY)
Patient being seen for today for Follow-up (MRI Lumbar Spine With & Without Contrast completed on 5/16/2025)  .    Subjective    Consuelo Carlson is a 65 y.o. female that presents with Follow-up (MRI Lumbar Spine With & Without Contrast completed on 5/16/2025)  .    HPI  Previously: Last seen on 4/17/2025 for neck and back pain with bilateral arm and leg pain.  She is going to discuss her neck and arm complaints with her PCP for further referral.  There was an order for MRI of the lumbar spine with and without contrast to evaluate the back and leg complaints.  There is plan to follow-up in 4 weeks to reassess.    Today: She reports new numbness in the left shoulder to the deltoid. She has pain in the neck, rarely extending to the left shoulder as well. She denies pain in the right side of the neck or upper extremity.    She continues to report lower back pain extending down both legs sometimes to the feet and toes. The pain is worse in the right leg. She reports numbness in the left. She reports weakness in both legs. She denies loss of bowel or bladder control.    She rates her overall pain 7/10 today.     reports that she has never smoked. She has never been exposed to tobacco smoke. She has never used smokeless tobacco.    Review of Systems   Musculoskeletal:  Positive for back pain and neck pain.        Left shoulder/deltoid,  Right leg to big toe   Neurological:  Positive for weakness and numbness.       Objective   Vitals:    05/20/25 1014   BP: 112/80   Pulse: 79   SpO2: 98%        Physical Exam  Constitutional:       Appearance: Normal appearance. She is normal weight.   Neck:      Comments: Pain in left neck with ROM  Pulmonary:      Effort: Pulmonary effort is normal.   Musculoskeletal:         General: Tenderness (midline and left cervical area, midline and bilateral lumbar area) present.      Comments: SLR negative bilaterally,  Angel's negative bilaterally   Neurological:      General: No focal  deficit present.      Mental Status: She is alert and oriented to person, place, and time.      Sensory: No sensory deficit.      Motor: No weakness.      Deep Tendon Reflexes: Reflexes normal.   Psychiatric:         Mood and Affect: Mood normal.         Behavior: Behavior normal.          Result Review   I have independently interpreted the MRI of the lumbar spine with and without contrast from 5/16/25 ordered at her last office visit. This shows transitional S1 vertebrae.  There is a previous left hemilaminectomy at L3-L4.  There is severe left foraminal narrowing at L3-L4.  There is moderate canal stenosis with moderate to severe bilateral foraminal narrowing at L4-L5.  There is moderate to severe bilateral foraminal narrowing at L5-S1.  There appears to be a small right sided synovial cyst at the L2-L3 level.  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    I have independently interpreted the MRI of the cervical spine with and without contrast from 5/16/25. This shows status post ACDF from C5-C7.  There are spondylosis changes from C2-C5.  There is moderate to severe right foraminal stenosis at C3-C4. There is mild bilateral foraminal narrowing at C4-C5.     Assessment and Plan {CC Problem List  Visit Diagnosis  ROS  Review (Popup)  [employer] Maintenance  Quality  BestPractice  Medications  SmartSets  SnapShot Encounters  Media :23}   Diagnoses and all orders for this visit:    1. Degeneration of intervertebral disc of lumbar region with discogenic back pain and lower extremity pain (Primary)    2. Cervical spondylosis without myelopathy    3. Personal history of spine surgery    4. Spinal stenosis of lumbar region with radiculopathy  -     Case Request; Standing  -     Follow Anesthesia Guidelines / Protocol; Future  -     Follow Anesthesia Guidelines / Protocol;  Standing  -     SCD (sequential compression device)- to be placed on patient in Pre-op; Standing  -     Verify / Perform Chlorhexidine Skin Prep; Standing  -     ceFAZolin (ANCEF) 2 g in sodium chloride 0.9 % 100 mL IVPB  -     Case Request    The neck complaints are progressive today. She has new numbness in the left shoulder and deltoid extending from the neck, pain usually stays around the neck.    There is some disc bulging above the ACDF at C4-C5. I think this is the worst change, but surgery may or may not help pain to the elbow.    She has back and bilateral leg pain, persistent in the right leg down to the foot, especially the big toe.    She has foraminal stenosis worse on the right at L5-S1, less at L4-L5. We discussed that this may contribute to her right leg complaints.    She did surgical approach with Dr. Ricardo including the risk, benefits and alternatives.  The patient made the decision to pursue spine surgery at this time will be scheduled for an invasive lumbar laminectomy using a right approach at L4-L5.    Follow Up {Instructions Charge Capture  Follow-up Communications :23}   Return for Postop.    She has spinal stenosis most notably at L4-5 with some foraminal narrowing at L5-S1 also.    She could consider a right approach for L4-5 minimally invasive laminectomy. If she failed this intervention, she could consider a spinal cord stimulator trial.

## 2025-05-20 NOTE — PROGRESS NOTES
Patient being seen for today for Follow-up (MRI Lumbar Spine With & Without Contrast completed on 5/16/2025)  .    Subjective    Consuelo Carlson is a 65 y.o. female that presents with Follow-up (MRI Lumbar Spine With & Without Contrast completed on 5/16/2025)  .    HPI  Previously: Last seen on 4/17/2025 for neck and back pain with bilateral arm and leg pain.  She is going to discuss her neck and arm complaints with her PCP for further referral.  There was an order for MRI of the lumbar spine with and without contrast to evaluate the back and leg complaints.  There is plan to follow-up in 4 weeks to reassess.    Today: She reports new numbness in the left shoulder to the deltoid. She has pain in the neck, rarely extending to the left shoulder as well. She denies pain in the right side of the neck or upper extremity.    She continues to report lower back pain extending down both legs sometimes to the feet and toes. The pain is worse in the right leg. She reports numbness in the left. She reports weakness in both legs. She denies loss of bowel or bladder control.    She rates her overall pain 7/10 today.     reports that she has never smoked. She has never been exposed to tobacco smoke. She has never used smokeless tobacco.    Review of Systems   Musculoskeletal:  Positive for back pain and neck pain.        Left shoulder/deltoid,  Right leg to big toe   Neurological:  Positive for weakness and numbness.       Objective   Vitals:    05/20/25 1014   BP: 112/80   Pulse: 79   SpO2: 98%        Physical Exam  Constitutional:       Appearance: Normal appearance. She is normal weight.   Neck:      Comments: Pain in left neck with ROM  Pulmonary:      Effort: Pulmonary effort is normal.   Musculoskeletal:         General: Tenderness (midline and left cervical area, midline and bilateral lumbar area) present.      Comments: SLR negative bilaterally,  Angel's negative bilaterally   Neurological:      General: No focal  deficit present.      Mental Status: She is alert and oriented to person, place, and time.      Sensory: No sensory deficit.      Motor: No weakness.      Deep Tendon Reflexes: Reflexes normal.   Psychiatric:         Mood and Affect: Mood normal.         Behavior: Behavior normal.          Result Review   I have independently interpreted the MRI of the lumbar spine with and without contrast from 5/16/25 ordered at her last office visit. This shows transitional S1 vertebrae.  There is a previous left hemilaminectomy at L3-L4.  There is severe left foraminal narrowing at L3-L4.  There is moderate canal stenosis with moderate to severe bilateral foraminal narrowing at L4-L5.  There is moderate to severe bilateral foraminal narrowing at L5-S1.  There appears to be a small right sided synovial cyst at the L2-L3 level.  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    I have independently interpreted the MRI of the cervical spine with and without contrast from 5/16/25. This shows status post ACDF from C5-C7.  There are spondylosis changes from C2-C5.  There is moderate to severe right foraminal stenosis at C3-C4. There is mild bilateral foraminal narrowing at C4-C5.     Assessment and Plan {CC Problem List  Visit Diagnosis  ROS  Review (Popup)  [employer] Maintenance  Quality  BestPractice  Medications  SmartSets  SnapShot Encounters  Media :23}   Diagnoses and all orders for this visit:    1. Degeneration of intervertebral disc of lumbar region with discogenic back pain and lower extremity pain (Primary)    2. Cervical spondylosis without myelopathy    3. Personal history of spine surgery    4. Spinal stenosis of lumbar region with radiculopathy  -     Case Request; Standing  -     Follow Anesthesia Guidelines / Protocol; Future  -     Follow Anesthesia Guidelines / Protocol;  Standing  -     SCD (sequential compression device)- to be placed on patient in Pre-op; Standing  -     Verify / Perform Chlorhexidine Skin Prep; Standing  -     ceFAZolin (ANCEF) 2 g in sodium chloride 0.9 % 100 mL IVPB  -     Case Request    The neck complaints are progressive today. She has new numbness in the left shoulder and deltoid extending from the neck, pain usually stays around the neck.    There is some disc bulging above the ACDF at C4-C5. I think this is the worst change, but surgery may or may not help pain to the elbow.    She has back and bilateral leg pain, persistent in the right leg down to the foot, especially the big toe.    She has foraminal stenosis worse on the right at L5-S1, less at L4-L5. We discussed that this may contribute to her right leg complaints.    She did surgical approach with Dr. Ricardo including the risk, benefits and alternatives.  The patient made the decision to pursue spine surgery at this time will be scheduled for an invasive lumbar laminectomy using a right approach at L4-L5.    Follow Up {Instructions Charge Capture  Follow-up Communications :23}   Return for Postop.    She has spinal stenosis most notably at L4-5 with some foraminal narrowing at L5-S1 also.    She could consider a right approach for L4-5 minimally invasive laminectomy. If she failed this intervention, she could consider a spinal cord stimulator trial.

## 2025-05-27 ENCOUNTER — CLINICAL SUPPORT (OUTPATIENT)
Dept: FAMILY MEDICINE CLINIC | Facility: CLINIC | Age: 65
End: 2025-05-27
Payer: MEDICARE

## 2025-05-27 DIAGNOSIS — J30.9 ALLERGIC RHINITIS, UNSPECIFIED SEASONALITY, UNSPECIFIED TRIGGER: Primary | ICD-10-CM

## 2025-05-27 PROCEDURE — 95117 IMMUNOTHERAPY INJECTIONS: CPT | Performed by: FAMILY MEDICINE

## 2025-05-27 NOTE — PROGRESS NOTES
..  History of Present Illness    Immunotherapy Pre-Injection Questionnaire:         Are you currently pregnant or been diagnosed with a new medical condition? No    1. Have you had increased asthmas symptoms (chest tightness, increased cough, wheezing, or shortness of breath) in the past week? No    2. Have you had increase allergy symptoms (itching eyes or nose, sneezing, runny nose, post-nasal drip, or throat-clearing) in the past week? No    3. Have you had a cold, respiratory tract infection, or flu-like symptoms in the past two weeks? No    4. Did you have any problems such as increased allergy or asthma symptoms, hives, or generalized itching within 12 hours or receiving your last injection? No    5. Are you on any new medications? New eye drops? No    6. Patient confirmed their name and birth date on allergy vials are correct. Yes     Assessment/Plan   Problems Addressed this Visit          Allergies and Adverse Reactions    Allergic rhinitis - Primary    Relevant Medications    patient supplied allergy injection (Start on 5/27/2025  4:30 PM)    patient supplied allergy injection (Start on 5/27/2025  4:30 PM)     Diagnoses         Codes Comments      Allergic rhinitis, unspecified seasonality, unspecified trigger    -  Primary ICD-10-CM: J30.9  ICD-9-CM: 477.9           Diagnoses and all orders for this visit                Clemente Gaffney 5/27/2025

## 2025-06-06 ENCOUNTER — CLINICAL SUPPORT (OUTPATIENT)
Dept: FAMILY MEDICINE CLINIC | Facility: CLINIC | Age: 65
End: 2025-06-06
Payer: MEDICARE

## 2025-06-06 DIAGNOSIS — J30.9 ALLERGIC RHINITIS, UNSPECIFIED SEASONALITY, UNSPECIFIED TRIGGER: Primary | ICD-10-CM

## 2025-06-06 NOTE — PROGRESS NOTES
History of Present Illness    Immunotherapy Pre-Injection Questionnaire:         Are you currently pregnant or been diagnosed with a new medical condition? No    1. Have you had increased asthmas symptoms (chest tightness, increased cough, wheezing, or shortness of breath) in the past week? No    2. Have you had increase allergy symptoms (itching eyes or nose, sneezing, runny nose, post-nasal drip, or throat-clearing) in the past week? No    3. Have you had a cold, respiratory tract infection, or flu-like symptoms in the past two weeks? No    4. Did you have any problems such as increased allergy or asthma symptoms, hives, or generalized itching within 12 hours or receiving your last injection? No    5. Are you on any new medications? New eye drops? No    6. Patient confirmed their name and birth date on allergy vials are correct. Yes     Assessment/Plan   Diagnoses and all orders for this visit:    1. Allergic rhinitis, unspecified seasonality, unspecified trigger (Primary)  -     patient supplied allergy injection  -     patient supplied allergy injection      Diagnoses and all orders for this visit                Anh Rodriges 6/6/2025

## 2025-06-06 NOTE — PRE-PROCEDURE INSTRUCTIONS
PATIENT INSTRUCTED TO BE:    - NOTHING TO EAT, DRINK OR CHEW AFTER MIDNIGHT      - TO HOLD ALL VITAMINS, SUPPLEMENTS, NSAIDS FOR ONE WEEK PRIOR TO THEIR SURGICAL PROCEDURE    - hold aspirin as instructed by dr. Ricardo's office.    Take metformin no later than 6 pm the evening prior to procedure    Hold jardiance 3 days prior to procedure date     - INSTRUCTED PT TO USE SURGICAL SOAP 1 TIME THE NIGHT PRIOR TO SURGERY ___6/10________ OR THE AM OF SURGERY ____6/11_________   USE THE SOAP FROM NECK TO TOES, AVOID THEIR FACE, HAIR, AND PRIVATE PARTS. IF USE THE SOAP THE NIGHT PRIOR TO SURGERY, CHANGE BED LINENS AND NO PETS IN THE BED.     INSTRUCTED NO LOTIONS, JEWELRY, PIERCINGS,  NAIL POLISH, OR DEODORANT DAY OF SURGERY    - IF DIABETIC, CHECK BLOOD GLUCOSE IF LESS THAN 70 OR HAVING SYMPTOMS CALL THE PREOP AREA FOR INSTRUCTIONS ON AM OF SURGERY ([phone])    -INSTRUCTED TO TAKE THE FOLLOWING MEDICATIONS THE DAY OF SURGERY WITH SIPS OF WATER: none    - DO NOT BRING ANY MEDICATIONS WITH YOU TO THE HOSPITAL THE DAY OF SURGERY, EXCEPT IF USE INHALERS. BRING INHALERS DAY OF SURGERY       - BRING CPAP OR BIPAP TO THE HOSPITAL ONLY IF YOU ARE SPENDING THE NIGHT    - DO NOT SMOKE OR VAPE 24 HOURS PRIOR TO PROCEDURE PER ANESTHESIA REQUEST     -MAKE SURE YOU HAVE A RIDE HOME OR SOMEONE TO STAY WITH YOU THE DAY OF THE PROCEDURE AFTER YOU GO HOME     - FOLLOW ANY OTHER INSTRUCTIONS GIVEN TO YOU BY YOUR SURGEON'S OFFICE.     - DAY OF SURGERY _6/11___,  MAIN ENTRANCE [facility]. TAKE ELEVATOR TO FIRST FLOOR, TURN LEFT AND CHECK IN WITH REGISTRATION    - YOU WILL RECEIVE A PHONE CALL THE DAY PRIOR TO SURGERY BETWEEN 1PM AND 4 PM WITH ARRIVAL TIME, IF YOUR SURGERY IS ON A MONDAY YOU WILL RECEIVE A CALL THE FRIDAY PRIOR TO SURGERY DATE    - BRING CASH OR CREDIT CARD FOR COPAYMENT OF MEDICATIONS AFTER SURGERY IF YOU USE THE HOSPITAL PHARMACY (MEDS TO BED)    - PREADMISSION TESTING NURSE RADHA JIMENEZ [phone] IF HAVE  ANY QUESTIONS     -PATIENT PROVIDED THE NUMBER FOR PREOP SURGICAL DEPT IF HAD QUESTIONS AFTER HOURS PRIOR TO SURGERY ([phone]).  INFORMED PT IF NO ANSWER, LEAVE A MESSAGE AND SOMEONE WILL RETURN THEIR CALL       PATIENT VERBALIZED UNDERSTANDING

## 2025-06-11 ENCOUNTER — ANESTHESIA EVENT (OUTPATIENT)
Dept: PERIOP | Facility: HOSPITAL | Age: 65
End: 2025-06-11
Payer: MEDICARE

## 2025-06-11 ENCOUNTER — ANESTHESIA (OUTPATIENT)
Dept: PERIOP | Facility: HOSPITAL | Age: 65
End: 2025-06-11
Payer: MEDICARE

## 2025-06-11 ENCOUNTER — APPOINTMENT (OUTPATIENT)
Dept: GENERAL RADIOLOGY | Facility: HOSPITAL | Age: 65
End: 2025-06-11
Payer: MEDICARE

## 2025-06-11 ENCOUNTER — HOSPITAL ENCOUNTER (OUTPATIENT)
Facility: HOSPITAL | Age: 65
Setting detail: HOSPITAL OUTPATIENT SURGERY
Discharge: HOME | End: 2025-06-11
Attending: NEUROLOGICAL SURGERY | Admitting: NEUROLOGICAL SURGERY
Payer: MEDICARE

## 2025-06-11 VITALS
WEIGHT: 130.07 LBS | BODY MASS INDEX: 22.21 KG/M2 | HEIGHT: 64 IN | SYSTOLIC BLOOD PRESSURE: 107 MMHG | TEMPERATURE: 96.8 F | OXYGEN SATURATION: 98 % | RESPIRATION RATE: 18 BRPM | HEART RATE: 74 BPM | DIASTOLIC BLOOD PRESSURE: 91 MMHG

## 2025-06-11 DIAGNOSIS — M48.061 SPINAL STENOSIS OF LUMBAR REGION WITH RADICULOPATHY: ICD-10-CM

## 2025-06-11 DIAGNOSIS — M54.16 SPINAL STENOSIS OF LUMBAR REGION WITH RADICULOPATHY: ICD-10-CM

## 2025-06-11 LAB
ANION GAP SERPL CALCULATED.3IONS-SCNC: 11.2 MMOL/L (ref 5–15)
BUN SERPL-MCNC: 10 MG/DL (ref 8–23)
BUN/CREAT SERPL: 13.7 (ref 7–25)
CALCIUM SPEC-SCNC: 8.7 MG/DL (ref 8.6–10.5)
CHLORIDE SERPL-SCNC: 103 MMOL/L (ref 98–107)
CO2 SERPL-SCNC: 25.8 MMOL/L (ref 22–29)
CREAT SERPL-MCNC: 0.73 MG/DL (ref 0.57–1)
EGFRCR SERPLBLD CKD-EPI 2021: 90.3 ML/MIN/1.73
GLUCOSE BLDC GLUCOMTR-MCNC: 167 MG/DL (ref 70–99)
GLUCOSE SERPL-MCNC: 187 MG/DL (ref 65–99)
POTASSIUM SERPL-SCNC: 4.1 MMOL/L (ref 3.5–5.2)
QT INTERVAL: 377 MS
QTC INTERVAL: 420 MS
SODIUM SERPL-SCNC: 140 MMOL/L (ref 136–145)

## 2025-06-11 PROCEDURE — 76000 FLUOROSCOPY <1 HR PHYS/QHP: CPT

## 2025-06-11 PROCEDURE — 25010000002 PROPOFOL 10 MG/ML EMULSION: Performed by: ANESTHESIOLOGY

## 2025-06-11 PROCEDURE — 25010000002 PHENYLEPHRINE HCL-NACL 1000-0.9 MCG/10ML-% SOLUTION PREFILLED SYRINGE: Performed by: ANESTHESIOLOGY

## 2025-06-11 PROCEDURE — 25010000002 ONDANSETRON PER 1 MG: Performed by: ANESTHESIOLOGY

## 2025-06-11 PROCEDURE — 93005 ELECTROCARDIOGRAM TRACING: CPT | Performed by: NEUROLOGICAL SURGERY

## 2025-06-11 PROCEDURE — 25010000002 LIDOCAINE 1% - EPINEPHRINE 1:100000 1 %-1:100000 SOLUTION: Performed by: NEUROLOGICAL SURGERY

## 2025-06-11 PROCEDURE — 82948 REAGENT STRIP/BLOOD GLUCOSE: CPT

## 2025-06-11 PROCEDURE — 25010000002 MIDAZOLAM PER 1MG: Performed by: ANESTHESIOLOGY

## 2025-06-11 PROCEDURE — 25010000002 DEXAMETHASONE PER 1 MG: Performed by: ANESTHESIOLOGY

## 2025-06-11 PROCEDURE — 25010000002 SUGAMMADEX 200 MG/2ML SOLUTION: Performed by: ANESTHESIOLOGY

## 2025-06-11 PROCEDURE — 25010000002 LIDOCAINE PF 2% 2 % SOLUTION: Performed by: ANESTHESIOLOGY

## 2025-06-11 PROCEDURE — 63047 LAM FACETEC & FORAMOT LUMBAR: CPT | Performed by: NEUROLOGICAL SURGERY

## 2025-06-11 PROCEDURE — 25010000002 CEFAZOLIN PER 500 MG: Performed by: NEUROLOGICAL SURGERY

## 2025-06-11 PROCEDURE — 80048 BASIC METABOLIC PNL TOTAL CA: CPT | Performed by: ANESTHESIOLOGY

## 2025-06-11 PROCEDURE — 25010000002 FENTANYL CITRATE (PF) 50 MCG/ML SOLUTION: Performed by: ANESTHESIOLOGY

## 2025-06-11 PROCEDURE — 25810000003 LACTATED RINGERS PER 1000 ML: Performed by: ANESTHESIOLOGY

## 2025-06-11 PROCEDURE — 93010 ELECTROCARDIOGRAM REPORT: CPT | Performed by: SPECIALIST

## 2025-06-11 RX ORDER — ROCURONIUM BROMIDE 10 MG/ML
INJECTION, SOLUTION INTRAVENOUS AS NEEDED
Status: DISCONTINUED | OUTPATIENT
Start: 2025-06-11 | End: 2025-06-11 | Stop reason: SURG

## 2025-06-11 RX ORDER — MIDAZOLAM HYDROCHLORIDE 2 MG/2ML
2 INJECTION, SOLUTION INTRAMUSCULAR; INTRAVENOUS ONCE
Status: COMPLETED | OUTPATIENT
Start: 2025-06-11 | End: 2025-06-11

## 2025-06-11 RX ORDER — ACETAMINOPHEN 500 MG
1000 TABLET ORAL ONCE
Status: COMPLETED | OUTPATIENT
Start: 2025-06-11 | End: 2025-06-11

## 2025-06-11 RX ORDER — OXYCODONE HYDROCHLORIDE 5 MG/1
5 TABLET ORAL
Status: DISCONTINUED | OUTPATIENT
Start: 2025-06-11 | End: 2025-06-11 | Stop reason: HOSPADM

## 2025-06-11 RX ORDER — HYDROCODONE BITARTRATE AND ACETAMINOPHEN 5; 325 MG/1; MG/1
1 TABLET ORAL EVERY 6 HOURS PRN
Qty: 20 TABLET | Refills: 0 | Status: SHIPPED | OUTPATIENT
Start: 2025-06-11

## 2025-06-11 RX ORDER — SODIUM CHLORIDE, SODIUM LACTATE, POTASSIUM CHLORIDE, CALCIUM CHLORIDE 600; 310; 30; 20 MG/100ML; MG/100ML; MG/100ML; MG/100ML
9 INJECTION, SOLUTION INTRAVENOUS CONTINUOUS PRN
Status: DISCONTINUED | OUTPATIENT
Start: 2025-06-11 | End: 2025-06-11 | Stop reason: HOSPADM

## 2025-06-11 RX ORDER — ONDANSETRON 2 MG/ML
4 INJECTION INTRAMUSCULAR; INTRAVENOUS ONCE AS NEEDED
Status: DISCONTINUED | OUTPATIENT
Start: 2025-06-11 | End: 2025-06-11 | Stop reason: HOSPADM

## 2025-06-11 RX ORDER — PROPOFOL 10 MG/ML
VIAL (ML) INTRAVENOUS AS NEEDED
Status: DISCONTINUED | OUTPATIENT
Start: 2025-06-11 | End: 2025-06-11 | Stop reason: SURG

## 2025-06-11 RX ORDER — PHENYLEPHRINE HCL IN 0.9% NACL 1 MG/10 ML
SYRINGE (ML) INTRAVENOUS AS NEEDED
Status: DISCONTINUED | OUTPATIENT
Start: 2025-06-11 | End: 2025-06-11 | Stop reason: SURG

## 2025-06-11 RX ORDER — FENTANYL CITRATE 50 UG/ML
INJECTION, SOLUTION INTRAMUSCULAR; INTRAVENOUS AS NEEDED
Status: DISCONTINUED | OUTPATIENT
Start: 2025-06-11 | End: 2025-06-11 | Stop reason: SURG

## 2025-06-11 RX ORDER — DEXAMETHASONE SODIUM PHOSPHATE 4 MG/ML
INJECTION, SOLUTION INTRA-ARTICULAR; INTRALESIONAL; INTRAMUSCULAR; INTRAVENOUS; SOFT TISSUE AS NEEDED
Status: DISCONTINUED | OUTPATIENT
Start: 2025-06-11 | End: 2025-06-11 | Stop reason: SURG

## 2025-06-11 RX ORDER — PROMETHAZINE HYDROCHLORIDE 25 MG/1
25 SUPPOSITORY RECTAL ONCE AS NEEDED
Status: DISCONTINUED | OUTPATIENT
Start: 2025-06-11 | End: 2025-06-11 | Stop reason: HOSPADM

## 2025-06-11 RX ORDER — PROMETHAZINE HYDROCHLORIDE 25 MG/1
25 TABLET ORAL ONCE AS NEEDED
Status: DISCONTINUED | OUTPATIENT
Start: 2025-06-11 | End: 2025-06-11 | Stop reason: HOSPADM

## 2025-06-11 RX ORDER — LIDOCAINE HYDROCHLORIDE AND EPINEPHRINE 10; 10 MG/ML; UG/ML
INJECTION, SOLUTION INFILTRATION; PERINEURAL AS NEEDED
Status: DISCONTINUED | OUTPATIENT
Start: 2025-06-11 | End: 2025-06-11 | Stop reason: HOSPADM

## 2025-06-11 RX ORDER — LIDOCAINE HYDROCHLORIDE 20 MG/ML
INJECTION, SOLUTION EPIDURAL; INFILTRATION; INTRACAUDAL; PERINEURAL AS NEEDED
Status: DISCONTINUED | OUTPATIENT
Start: 2025-06-11 | End: 2025-06-11 | Stop reason: SURG

## 2025-06-11 RX ORDER — MAGNESIUM HYDROXIDE 1200 MG/15ML
LIQUID ORAL AS NEEDED
Status: DISCONTINUED | OUTPATIENT
Start: 2025-06-11 | End: 2025-06-11 | Stop reason: HOSPADM

## 2025-06-11 RX ORDER — ONDANSETRON 2 MG/ML
INJECTION INTRAMUSCULAR; INTRAVENOUS AS NEEDED
Status: DISCONTINUED | OUTPATIENT
Start: 2025-06-11 | End: 2025-06-11 | Stop reason: SURG

## 2025-06-11 RX ORDER — DEXMEDETOMIDINE HYDROCHLORIDE 100 UG/ML
INJECTION, SOLUTION INTRAVENOUS AS NEEDED
Status: DISCONTINUED | OUTPATIENT
Start: 2025-06-11 | End: 2025-06-11 | Stop reason: SURG

## 2025-06-11 RX ADMIN — ONDANSETRON 4 MG: 2 INJECTION INTRAMUSCULAR; INTRAVENOUS at 11:38

## 2025-06-11 RX ADMIN — DEXMEDETOMIDINE 10 MCG: 100 INJECTION, SOLUTION, CONCENTRATE INTRAVENOUS at 11:12

## 2025-06-11 RX ADMIN — Medication 150 MCG: at 11:31

## 2025-06-11 RX ADMIN — MIDAZOLAM HYDROCHLORIDE 2 MG: 1 INJECTION, SOLUTION INTRAMUSCULAR; INTRAVENOUS at 10:16

## 2025-06-11 RX ADMIN — FENTANYL CITRATE 50 MCG: 50 INJECTION, SOLUTION INTRAMUSCULAR; INTRAVENOUS at 11:08

## 2025-06-11 RX ADMIN — ROCURONIUM BROMIDE 50 MG: 10 INJECTION, SOLUTION INTRAVENOUS at 10:43

## 2025-06-11 RX ADMIN — Medication 100 MCG: at 11:16

## 2025-06-11 RX ADMIN — SODIUM CHLORIDE 2 G: 9 INJECTION, SOLUTION INTRAVENOUS at 10:49

## 2025-06-11 RX ADMIN — SODIUM CHLORIDE, POTASSIUM CHLORIDE, SODIUM LACTATE AND CALCIUM CHLORIDE 9 ML/HR: 600; 310; 30; 20 INJECTION, SOLUTION INTRAVENOUS at 09:37

## 2025-06-11 RX ADMIN — Medication 100 MCG: at 11:24

## 2025-06-11 RX ADMIN — LIDOCAINE HYDROCHLORIDE 100 MG: 20 INJECTION, SOLUTION EPIDURAL; INFILTRATION; INTRACAUDAL; PERINEURAL at 10:43

## 2025-06-11 RX ADMIN — ACETAMINOPHEN 1000 MG: 500 TABLET ORAL at 09:37

## 2025-06-11 RX ADMIN — FENTANYL CITRATE 50 MCG: 50 INJECTION, SOLUTION INTRAMUSCULAR; INTRAVENOUS at 10:43

## 2025-06-11 RX ADMIN — SUGAMMADEX 200 MG: 100 INJECTION, SOLUTION INTRAVENOUS at 11:42

## 2025-06-11 RX ADMIN — DEXAMETHASONE SODIUM PHOSPHATE 4 MG: 4 INJECTION, SOLUTION INTRAMUSCULAR; INTRAVENOUS at 10:43

## 2025-06-11 RX ADMIN — PROPOFOL 150 MG: 10 INJECTION, EMULSION INTRAVENOUS at 10:43

## 2025-06-11 NOTE — DISCHARGE INSTRUCTIONS
DISCHARGE INSTRUCTIONS  DISCECTOMY/ LAMINECTOMY  [] MINIMALLY INVASIVE      For your surgery you had:  General anesthesia (you may have a sore throat for the first 24 hours)  You may experience dizziness, drowsiness, or light-headedness for several hours following surgery  Do not stay alone today or tonight.  Limit your activity for 24 hours.  You should not drive, operate machinery, drink alcohol, or sign legally binding documents for 24 hours or while you are taking pain medication.  Activity  For the first two weeks following surgery, remain close to home and do not do any lifting, bending or strenuous activity.  Walking is the best exercise and you can increase the amount you walk as you feel like it.  Riding in a car for short distances (15-20 minutes) is okay but do not drive until you are off all narcotic medications.  If you have a sedentary job, you may resume work as soon as you feel like it (this is rarely less than one week).  Pain Control  Take your pain medicines as needed and as prescribed.  As you are feeling better, you can decrease the prescribed pain medication and take over-the-counter medications such as Tylenol or Ibuprofen.  The prescribed pain medicines tend to be constipating so it is important to eat a well-balanced diet and take a stool softener if recommended by the doctor.  Activity such as walking also helps keep your bowels regular.  Last dose of pain medication was given at:    Tylenol (1000mg) last at 9:37am. Do not exceed 4000mg of tylenol in a 24 hour period. Incision Care  Your sutures are under the skin and will dissolve in time.  You may shower as soon as you like.    [x] You have a clear dressing, which you should remove in 3-4 days.  Beneath this dressing are steri-strips that will peel off over time.  If these steri-strips have not peeled off in 10-14 days, you may remove them.  Gently washing your incision with mild soap and rinsing with water during your shower is all  you need to do to care for the incision.  Do not scrub the incision or sit in the bathtub.  Check your incision site each day to see how it looks.  Some redness and bruising is normal for the first few days.  NOTIFY YOUR DOCTOR IF YOU EXPERIENCE ANY OF THE FOLLOWING:   You have a fever over 100.8o Fahrenheit orally  Shaking chills  Your incision is red, hot to touch, or has excessive drainage  Your incision starts to separate  Increase in bleeding or bleeding that is excessive  Nausea, vomiting and/or pain not controlled by prescribed medications  Unable to urinate in 6 hours after surgery  You may contact 's clinic at [phone] with any questions or concerns.  If unable to reach your doctor, please go to the closest emergency room.  SPECIAL INSTRUCTIONS:  1) No driving for 5-7 days and no longer taking narcotics.   2) May shower as soon as desired, no submerging incision.   3) Do not lift / push / pull more than 8-10 lbs.   4) Minimize bending/twisting at the waist and jarring activity such as lawnmower.

## 2025-06-11 NOTE — ANESTHESIA PREPROCEDURE EVALUATION
Anesthesia Evaluation     Patient summary reviewed and Nursing notes reviewed   no history of anesthetic complications:   NPO Solid Status: > 8 hours  NPO Liquid Status: > 2 hours           Airway   Mallampati: II  TM distance: >3 FB  Neck ROM: full  No difficulty expected  Dental - normal exam     Pulmonary - negative pulmonary ROS and normal exam    breath sounds clear to auscultation  Cardiovascular - normal exam  Exercise tolerance: good (4-7 METS)    ECG reviewed  Rhythm: regular  Rate: normal    (+) hypertension, hyperlipidemia      Neuro/Psych  (+) numbness, psychiatric history Anxiety and Depression  GI/Hepatic/Renal/Endo    (+) GERD, diabetes mellitus    Musculoskeletal     (+) back pain      ROS comment: Prior neck fusion, good rom  Abdominal    Substance History      OB/GYN          Other   arthritis,     ROS/Med Hx Other: PAT Nursing Notes unavailable.               Anesthesia Plan    ASA 2     general     (Patient understands anesthesia not responsible for dental damage.)  intravenous induction     Anesthetic plan, risks, benefits, and alternatives have been provided, discussed and informed consent has been obtained with: patient.    Plan discussed with CRNA.    CODE STATUS:

## 2025-06-11 NOTE — ANESTHESIA POSTPROCEDURE EVALUATION
Patient: Consuelo Carlson    Procedure Summary       Date: 06/11/25 Room / Location: [facility] OR 06 / [facility] MAIN OR    Anesthesia Start: 1039 Anesthesia Stop: 1202    Procedure: MINIMALLY INVASIVE LUMBAR LAMINECTOMY, right approach, lumbar four-lumbar five (Right: Spine Lumbar) Diagnosis:       Spinal stenosis of lumbar region with radiculopathy      (Spinal stenosis of lumbar region with radiculopathy [M48.061, M54.16])    Surgeons: Quinn Ricardo MD Provider: Jadon Beard MD    Anesthesia Type: general ASA Status: 2            Anesthesia Type: general    Vitals  Vitals Value Taken Time   /64 06/11/25 12:31   Temp 36.9 °C (98.5 °F) 06/11/25 12:30   Pulse 78 06/11/25 12:32   Resp 18 06/11/25 12:30   SpO2 93 % 06/11/25 12:32   Vitals shown include unfiled device data.        Post Anesthesia Care and Evaluation    Patient location during evaluation: bedside  Patient participation: complete - patient participated  Level of consciousness: awake  Pain management: adequate    Airway patency: patent  PONV Status: none  Cardiovascular status: acceptable and stable  Respiratory status: acceptable  Hydration status: acceptable

## 2025-06-11 NOTE — OP NOTE
LUMBAR DISCECTOMY POSTERIOR WITH METRIX  Procedure Report    Patient Name:  Consuelo Carlson  YOB: 1960    Date of Surgery:  6/11/2025     Indications: Lumbar spinal stenosis with radiculopathy on the right    Pre-op Diagnosis:   Spinal stenosis of lumbar region with radiculopathy [M48.061, M54.16]       Post-Op Diagnosis Codes:     * Spinal stenosis of lumbar region with radiculopathy [M48.061, M54.16]    Procedure/CPT® Codes:  38443    Procedure(s):  MINIMALLY INVASIVE LUMBAR LAMINECTOMY, right approach, lumbar four-lumbar five    Staff:  Surgeon(s):  Quinn Ricardo MD    Assistant: Anabelle Medrano    Anesthesia: General    Estimated Blood Loss: 20 mL      Specimen:          None        Findings: Lateral recess stenosis right greater than left    Complications: None    Description of Procedure: After informed consent was obtained, the patient was brought to the operating room.  After induction of adequate general endotracheal anesthesia the patient was placed in the prone position on the Davion table utilizing the Rico frame.  All pressure points were padded.  The back was prepped and draped in the typical fashion.  The midline was marked and a line approximately 2-1/2 cm to the right of midline was drawn.  On this line the L4-5 interspace was localized using a spinal needle and the C arm.  A 2 cm incision was then made over the level.  The Boss tubular retractor system was used to dilate the tissue docking at L4-5.  The level was again confirmed with fluoroscopy.  The microscope was brought in and used for the remainder of the case for improved magnification and illumination.  The lamina was cleared of soft tissue and the Kerrison punches were used to remove the lamina above the insertion of the ligamentum flavum.  The ligamentum flavum was then resected inferiorly and laterally undercutting the medial facet.  After undercutting the medial facet and ligament, the ball probe passed freely  along the right L5 nerve root.  The tubular retractor was tilted medially to allow exposure of the patient's left lateral recess.  The ligament was removed from its insertion at L5 and the left lateral recess was then undercut using the Kerrison punches until a ball probe passed freely along the left L5 nerve root.  The right lateral recess stenosis was greater than the left.  After decompression was felt to be adequate, hemostasis was obtained using the bipolar electrocautery as well as Gelfoam.  The wound was irrigated with normal saline.    The tubular retractor was removed and hemostasis assured in the soft tissue.  The incision was reapproximated using interrupted 0 Vicryl in the fascia and a 2-0 Vicryl in the subcutaneous tissue.  The wound was dressed with Mastisol, Steri-Strips, Telfa and a Tegaderm.     Assistant: Anabelle Medrano  was responsible for performing the following activities: Retraction, Suction, and Placing Dressing and their skilled assistance was necessary for the success of this case.    Quinn Ricardo MD     Date: 6/11/2025  Time: 11:52 EDT

## 2025-06-17 ENCOUNTER — CLINICAL SUPPORT (OUTPATIENT)
Dept: FAMILY MEDICINE CLINIC | Facility: CLINIC | Age: 65
End: 2025-06-17
Payer: MEDICARE

## 2025-06-17 DIAGNOSIS — J30.9 ALLERGIC RHINITIS, UNSPECIFIED SEASONALITY, UNSPECIFIED TRIGGER: Primary | ICD-10-CM

## 2025-06-17 PROCEDURE — 95117 IMMUNOTHERAPY INJECTIONS: CPT | Performed by: FAMILY MEDICINE

## 2025-06-17 NOTE — PROGRESS NOTES
History of Present Illness    Immunotherapy Pre-Injection Questionnaire:         Are you currently pregnant or been diagnosed with a new medical condition? No    1. Have you had increased asthmas symptoms (chest tightness, increased cough, wheezing, or shortness of breath) in the past week? No    2. Have you had increase allergy symptoms (itching eyes or nose, sneezing, runny nose, post-nasal drip, or throat-clearing) in the past week? No    3. Have you had a cold, respiratory tract infection, or flu-like symptoms in the past two weeks? No    4. Did you have any problems such as increased allergy or asthma symptoms, hives, or generalized itching within 12 hours or receiving your last injection? No    5. Are you on any new medications? New eye drops? No    6. Patient confirmed their name and birth date on allergy vials are correct. Yes     Assessment/Plan     Diagnoses and all orders for this visit      Rashmi Griffith RN 6/17/2025

## 2025-07-01 ENCOUNTER — CLINICAL SUPPORT (OUTPATIENT)
Dept: FAMILY MEDICINE CLINIC | Facility: CLINIC | Age: 65
End: 2025-07-01
Payer: MEDICARE

## 2025-07-01 DIAGNOSIS — J30.9 ALLERGIC RHINITIS, UNSPECIFIED SEASONALITY, UNSPECIFIED TRIGGER: Primary | ICD-10-CM

## 2025-07-01 PROCEDURE — 95117 IMMUNOTHERAPY INJECTIONS: CPT | Performed by: FAMILY MEDICINE

## 2025-07-01 NOTE — PROGRESS NOTES
History of Present Illness    Immunotherapy Pre-Injection Questionnaire:         Are you currently pregnant or been diagnosed with a new medical condition? No    1. Have you had increased asthmas symptoms (chest tightness, increased cough, wheezing, or shortness of breath) in the past week? No    2. Have you had increase allergy symptoms (itching eyes or nose, sneezing, runny nose, post-nasal drip, or throat-clearing) in the past week? No    3. Have you had a cold, respiratory tract infection, or flu-like symptoms in the past two weeks? No    4. Did you have any problems such as increased allergy or asthma symptoms, hives, or generalized itching within 12 hours or receiving your last injection? No    5. Are you on any new medications? New eye drops? No    6. Patient confirmed their name and birth date on allergy vials are correct. Yes     Assessment/Plan   Diagnoses and all orders for this visit:    1. Allergic rhinitis, unspecified seasonality, unspecified trigger (Primary)  -     patient supplied allergy injection  -     patient supplied allergy injection      Diagnoses and all orders for this visit                Anisha Walsh MA 7/1/2025

## 2025-07-03 ENCOUNTER — OFFICE VISIT (OUTPATIENT)
Dept: NEUROSURGERY | Facility: CLINIC | Age: 65
End: 2025-07-03
Payer: MEDICARE

## 2025-07-03 VITALS
SYSTOLIC BLOOD PRESSURE: 100 MMHG | BODY MASS INDEX: 21.85 KG/M2 | DIASTOLIC BLOOD PRESSURE: 70 MMHG | HEIGHT: 64 IN | WEIGHT: 128 LBS | HEART RATE: 80 BPM

## 2025-07-03 DIAGNOSIS — Z98.890 STATUS POST LUMBAR SURGERY: ICD-10-CM

## 2025-07-03 DIAGNOSIS — M48.061 SPINAL STENOSIS OF LUMBAR REGION WITH RADICULOPATHY: Primary | ICD-10-CM

## 2025-07-03 DIAGNOSIS — M54.16 SPINAL STENOSIS OF LUMBAR REGION WITH RADICULOPATHY: Primary | ICD-10-CM

## 2025-07-03 NOTE — PROGRESS NOTES
Patient being seen for today for Post-op (MINIMALLY INVASIVE LUMBAR LAMINECTOMY completed on 6/11/2025)  .    Subjective    Consuelo Carlson is a 65 y.o. female that presents with Post-op (MINIMALLY INVASIVE LUMBAR LAMINECTOMY completed on 6/11/2025)  .    HPI  Previously: She is status post minimally invasive lumbar laminectomy using a right approach at L4-L5 on 6/11/2025.  She previously complained of back pain and bilateral leg pain, worse in the right leg to the foot especially the big toe.    Today: She reports no shooting pain in the leg since surgery. She reports ongoing numbness in the right foot.    She has been restricting her activity.     reports that she has never smoked. She has never been exposed to tobacco smoke. She has never used smokeless tobacco.    Review of Systems   Musculoskeletal:  Negative for back pain.   Neurological:  Positive for numbness.       Objective   Vitals:    07/03/25 1501   BP: 100/70   Pulse: 80        Physical Exam  Constitutional:       Appearance: Normal appearance. She is obese.   Pulmonary:      Effort: Pulmonary effort is normal.   Musculoskeletal:         General: Tenderness (midline lumbar) present.   Skin:     Comments: Right lumbar incisions is well-healed without erythema or discharge   Neurological:      General: No focal deficit present.      Mental Status: She is alert and oriented to person, place, and time.      Sensory: No sensory deficit.      Motor: No weakness.      Deep Tendon Reflexes: Reflexes normal.   Psychiatric:         Mood and Affect: Mood normal.         Behavior: Behavior normal.          Result Review   None.     Assessment and Plan {CC Problem List  Visit Diagnosis  ROS  Review (Popup)  Health Maintenance  Quality  BestPractice  Medications  SmartSets  SnapShot Encounters  Media :23}   Diagnoses and all orders for this visit:    1. Spinal stenosis of lumbar region with radiculopathy (Primary)    2. Status post lumbar  surgery    She has seen improvement after surgery.    She will continue physical restrictions for the next 3 weeks, then resume normal activity as tolerated.    I am recommending the following restrictions: No heavy lifting greater than 5 lb, limit twisting and bending at the waist, avoidance of strenuous activity. She may walk as tolerated.    Follow Up {Instructions Charge Capture  Follow-up Communications :23}   Return if symptoms worsen or fail to improve.

## 2025-07-10 DIAGNOSIS — I10 PRIMARY HYPERTENSION: Chronic | ICD-10-CM

## 2025-07-10 RX ORDER — METOPROLOL SUCCINATE 50 MG/1
TABLET, EXTENDED RELEASE ORAL
Qty: 90 TABLET | Refills: 0 | Status: SHIPPED | OUTPATIENT
Start: 2025-07-10

## 2025-07-10 NOTE — TELEPHONE ENCOUNTER
Rx Refill Note  Requested Prescriptions     Pending Prescriptions Disp Refills    metoprolol succinate XL (TOPROL-XL) 50 MG 24 hr tablet [Pharmacy Med Name: METOPROLOL SUCCINATE ER 50 MG TABLET] 90 tablet 0     Sig: TAKE 1 TABLET BY MOUTH ONCE DAILY FOR BLOOD PRESSURE OR HEART      Last office visit with prescribing clinician: 3/3/2025   Last telemedicine visit with prescribing clinician: Visit date not found   Next office visit with prescribing clinician: Visit date not found                         Would you like a call back once the refill request has been completed: [] Yes [] No    If the office needs to give you a call back, can they leave a voicemail: [] Yes [] No    Anisha Walsh MA  07/10/25, 11:20 EDT

## 2025-07-16 ENCOUNTER — CLINICAL SUPPORT (OUTPATIENT)
Dept: FAMILY MEDICINE CLINIC | Facility: CLINIC | Age: 65
End: 2025-07-16
Payer: MEDICARE

## 2025-07-16 DIAGNOSIS — J30.9 ALLERGIC RHINITIS, UNSPECIFIED SEASONALITY, UNSPECIFIED TRIGGER: Primary | ICD-10-CM

## 2025-07-16 PROCEDURE — 95117 IMMUNOTHERAPY INJECTIONS: CPT | Performed by: FAMILY MEDICINE

## 2025-07-16 NOTE — PROGRESS NOTES
History of Present Illness    Immunotherapy Pre-Injection Questionnaire:         Are you currently pregnant or been diagnosed with a new medical condition? No    1. Have you had increased asthmas symptoms (chest tightness, increased cough, wheezing, or shortness of breath) in the past week? No    2. Have you had increase allergy symptoms (itching eyes or nose, sneezing, runny nose, post-nasal drip, or throat-clearing) in the past week? No    3. Have you had a cold, respiratory tract infection, or flu-like symptoms in the past two weeks? No    4. Did you have any problems such as increased allergy or asthma symptoms, hives, or generalized itching within 12 hours or receiving your last injection? No    5. Are you on any new medications? New eye drops? No    6. Patient confirmed their name and birth date on allergy vials are correct. Yes     Assessment/Plan     Diagnoses and all orders for this visit                Melanie Pan MA 7/16/2025

## 2025-07-22 DIAGNOSIS — F51.01 PRIMARY INSOMNIA: Chronic | ICD-10-CM

## 2025-07-22 RX ORDER — TEMAZEPAM 30 MG/1
CAPSULE ORAL
Qty: 30 CAPSULE | Refills: 4 | Status: SHIPPED | OUTPATIENT
Start: 2025-07-22

## 2025-07-31 ENCOUNTER — CLINICAL SUPPORT (OUTPATIENT)
Dept: FAMILY MEDICINE CLINIC | Facility: CLINIC | Age: 65
End: 2025-07-31
Payer: MEDICARE

## 2025-07-31 DIAGNOSIS — J30.9 ALLERGIC RHINITIS, UNSPECIFIED SEASONALITY, UNSPECIFIED TRIGGER: Primary | ICD-10-CM

## 2025-07-31 PROCEDURE — 95117 IMMUNOTHERAPY INJECTIONS: CPT | Performed by: FAMILY MEDICINE

## 2025-07-31 NOTE — PROGRESS NOTES
History of Present Illness    Immunotherapy Pre-Injection Questionnaire:         Are you currently pregnant or been diagnosed with a new medical condition? No    1. Have you had increased asthmas symptoms (chest tightness, increased cough, wheezing, or shortness of breath) in the past week? No    2. Have you had increase allergy symptoms (itching eyes or nose, sneezing, runny nose, post-nasal drip, or throat-clearing) in the past week? No    3. Have you had a cold, respiratory tract infection, or flu-like symptoms in the past two weeks? No    4. Did you have any problems such as increased allergy or asthma symptoms, hives, or generalized itching within 12 hours or receiving your last injection? No    5. Are you on any new medications? New eye drops? No    6. Patient confirmed their name and birth date on allergy vials are correct. Yes     Assessment/Plan     Diagnoses and all orders for this visit                Melanie Pan MA 7/31/2025

## 2025-08-04 DIAGNOSIS — F32.5 MAJOR DEPRESSIVE DISORDER IN FULL REMISSION, UNSPECIFIED WHETHER RECURRENT: Chronic | ICD-10-CM

## 2025-08-04 DIAGNOSIS — F51.01 PRIMARY INSOMNIA: Chronic | ICD-10-CM

## 2025-08-04 RX ORDER — VENLAFAXINE HYDROCHLORIDE 150 MG/1
150 CAPSULE, EXTENDED RELEASE ORAL DAILY
Qty: 90 CAPSULE | Refills: 0 | Status: SHIPPED | OUTPATIENT
Start: 2025-08-04

## 2025-08-05 ENCOUNTER — HOSPITAL ENCOUNTER (OUTPATIENT)
Dept: PET IMAGING | Facility: HOSPITAL | Age: 65
Discharge: HOME | End: 2025-08-05
Admitting: OBSTETRICS & GYNECOLOGY
Payer: MEDICARE

## 2025-08-05 DIAGNOSIS — Z78.0 POST-MENOPAUSAL: ICD-10-CM

## 2025-08-05 PROCEDURE — 77080 DXA BONE DENSITY AXIAL: CPT

## 2025-08-13 ENCOUNTER — CLINICAL SUPPORT (OUTPATIENT)
Dept: FAMILY MEDICINE CLINIC | Facility: CLINIC | Age: 65
End: 2025-08-13
Payer: MEDICARE

## 2025-08-13 DIAGNOSIS — J30.9 ALLERGIC RHINITIS, UNSPECIFIED SEASONALITY, UNSPECIFIED TRIGGER: Primary | ICD-10-CM

## 2025-08-13 PROCEDURE — 95117 IMMUNOTHERAPY INJECTIONS: CPT | Performed by: FAMILY MEDICINE

## 2025-08-26 ENCOUNTER — CLINICAL SUPPORT (OUTPATIENT)
Dept: FAMILY MEDICINE CLINIC | Facility: CLINIC | Age: 65
End: 2025-08-26
Payer: MEDICARE

## 2025-08-26 DIAGNOSIS — J30.9 ALLERGIC RHINITIS, UNSPECIFIED SEASONALITY, UNSPECIFIED TRIGGER: Primary | ICD-10-CM

## 2025-08-26 PROCEDURE — 95117 IMMUNOTHERAPY INJECTIONS: CPT | Performed by: FAMILY MEDICINE

## (undated) DEVICE — TOTAL TRAY, 16FR 10ML SIL FOLEY, URN: Brand: MEDLINE

## (undated) DEVICE — INTENDED FOR TISSUE SEPARATION, AND OTHER PROCEDURES THAT REQUIRE A SHARP SURGICAL BLADE TO PUNCTURE OR CUT.: Brand: BARD-PARKER ® CARBON RIB-BACK BLADES

## (undated) DEVICE — NDL SPINE 20G 3 1/2 YEL STRL 1P/U

## (undated) DEVICE — Device

## (undated) DEVICE — DRP MICROSCP LECIA W/CLEARLENS 137X381CM

## (undated) DEVICE — GLV SURG BIOGEL LTX PF 7 1/2

## (undated) DEVICE — ANTIBACTERIAL UNDYED BRAIDED (POLYGLACTIN 910), SYNTHETIC ABSORBABLE SUTURE: Brand: COATED VICRYL

## (undated) DEVICE — ELECTRD BLD EXT EDGE 1P COAT 6.5IN STRL

## (undated) DEVICE — SYR LUERLOK 50ML

## (undated) DEVICE — THE STERILE LIGHT HANDLE COVER IS USED WITH STERIS SURGICAL LIGHTING AND VISUALIZATION SYSTEMS.

## (undated) DEVICE — LAMINECTOMY CERVICAL DISC-LF: Brand: MEDLINE INDUSTRIES, INC.

## (undated) DEVICE — SUT VIC 0/0 UR6 27IN DYED J603H

## (undated) DEVICE — SYS ENSING FLUID M/ ADD MAC1001

## (undated) DEVICE — STERILE POLYISOPRENE POWDER-FREE SURGICAL GLOVES WITH EMOLLIENT COATING: Brand: PROTEXIS

## (undated) DEVICE — STPLR SKIN VISISTAT WD 35CT

## (undated) DEVICE — Device: Brand: FABCO

## (undated) DEVICE — SLV SCD KN/LEN ADJ EXPRSS BLENDED MD 1P/U

## (undated) DEVICE — 1010 S-DRAPE TOWEL DRAPE 10/BX: Brand: STERI-DRAPE™

## (undated) DEVICE — PANTY KNIT MATERN L/XL

## (undated) DEVICE — APPL CHLORAPREP W/TINT 26ML ORNG

## (undated) DEVICE — EXTRCT STPL SKIN STRL BX/12

## (undated) DEVICE — ELECTRD BLD EDGE/INSUL1P 2.4X5.1MM STRL

## (undated) DEVICE — ENCORE® LATEX ORTHO SIZE 8, STERILE LATEX POWDER-FREE SURGICAL GLOVE: Brand: ENCORE

## (undated) DEVICE — PK UNIV COMPL 40

## (undated) DEVICE — SYR LL TP 10ML STRL

## (undated) DEVICE — GOWN,NON-REINFORCED,SIRUS,SET IN SLV,XL: Brand: MEDLINE

## (undated) DEVICE — GAMMEX® NON-LATEX SIZE 7.5, STERILE NEOPRENE POWDER-FREE SURGICAL GLOVE: Brand: GAMMEX

## (undated) DEVICE — SPNG GZ WOVN 4X4IN 12PLY 10/BX STRL

## (undated) DEVICE — APPL CHLORAPREP HI/LITE 26ML ORNG

## (undated) DEVICE — PAD,ABDOMINAL,8"X10",ST,LF: Brand: MEDLINE

## (undated) DEVICE — SUT ETHLN 3/0 PS1 18IN 1663H

## (undated) DEVICE — 3M™ STERI-STRIP™ REINFORCED ADHESIVE SKIN CLOSURES, R1547, 1/2 IN X 4 IN (12 MM X 100 MM), 6 STRIPS/ENVELOPE: Brand: 3M™ STERI-STRIP™

## (undated) DEVICE — JACKSON-PRATT 100CC BULB RESERVOIR: Brand: CARDINAL HEALTH

## (undated) DEVICE — UNDYED MONOFILAMENT (POLYDIOXANONE), ABSORBABLE SYNTHETIC SURGICAL SUTURE: Brand: PDS

## (undated) DEVICE — PENCL E/S HNDSWTCH SMOKEEVAC HOLSTR 10FT

## (undated) DEVICE — MARKR SKIN W/RULR AND LBL